# Patient Record
Sex: MALE | Race: WHITE
[De-identification: names, ages, dates, MRNs, and addresses within clinical notes are randomized per-mention and may not be internally consistent; named-entity substitution may affect disease eponyms.]

---

## 2017-01-02 ENCOUNTER — HOSPITAL ENCOUNTER (EMERGENCY)
Dept: HOSPITAL 47 - EC | Age: 67
Discharge: HOME | End: 2017-01-02
Payer: MEDICARE

## 2017-01-02 VITALS — SYSTOLIC BLOOD PRESSURE: 159 MMHG | TEMPERATURE: 98 F | DIASTOLIC BLOOD PRESSURE: 82 MMHG | HEART RATE: 94 BPM

## 2017-01-02 VITALS — RESPIRATION RATE: 18 BRPM

## 2017-01-02 DIAGNOSIS — I10: ICD-10-CM
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
## 2017-01-02 DIAGNOSIS — Z87.891: ICD-10-CM

## 2017-01-02 DIAGNOSIS — F32.9: Primary | ICD-10-CM

## 2017-01-02 DIAGNOSIS — F10.129: ICD-10-CM

## 2017-01-02 DIAGNOSIS — Z79.899: ICD-10-CM

## 2017-01-02 LAB
ALP SERPL-CCNC: 116 U/L (ref 38–126)
ALT SERPL-CCNC: 37 U/L (ref 21–72)
AMYLASE SERPL-CCNC: 89 U/L (ref 30–110)
ANION GAP SERPL CALC-SCNC: 19 MMOL/L
AST SERPL-CCNC: 40 U/L (ref 17–59)
BASOPHILS # BLD AUTO: 0.1 K/UL (ref 0–0.2)
BASOPHILS NFR BLD AUTO: 1 %
BUN SERPL-SCNC: 13 MG/DL (ref 9–20)
CALCIUM SPEC-MCNC: 8.8 MG/DL (ref 8.4–10.2)
CH: 31.3
CHCM: 35.3
CHLORIDE SERPL-SCNC: 101 MMOL/L (ref 98–107)
CO2 SERPL-SCNC: 20 MMOL/L (ref 22–30)
EOSINOPHIL # BLD AUTO: 0.1 K/UL (ref 0–0.7)
EOSINOPHIL NFR BLD AUTO: 1 %
ERYTHROCYTE [DISTWIDTH] IN BLOOD BY AUTOMATED COUNT: 5.91 M/UL (ref 4.3–5.9)
ERYTHROCYTE [DISTWIDTH] IN BLOOD: 14.5 % (ref 11.5–15.5)
GLUCOSE SERPL-MCNC: 97 MG/DL (ref 74–99)
HCT VFR BLD AUTO: 52.6 % (ref 39–53)
HDW: 2.7
HGB BLD-MCNC: 18.1 GM/DL (ref 13–17.5)
LUC NFR BLD AUTO: 2 %
LYMPHOCYTES # SPEC AUTO: 1.2 K/UL (ref 1–4.8)
LYMPHOCYTES NFR SPEC AUTO: 15 %
MAGNESIUM SPEC-SCNC: 2.2 MG/DL (ref 1.6–2.3)
MCH RBC QN AUTO: 30.7 PG (ref 25–35)
MCHC RBC AUTO-ENTMCNC: 34.5 G/DL (ref 31–37)
MCV RBC AUTO: 89 FL (ref 80–100)
MONOCYTES # BLD AUTO: 0.4 K/UL (ref 0–1)
MONOCYTES NFR BLD AUTO: 5 %
NEUTROPHILS # BLD AUTO: 6.5 K/UL (ref 1.3–7.7)
NEUTROPHILS NFR BLD AUTO: 78 %
NON-AFRICAN AMERICAN GFR(MDRD): >60
POTASSIUM SERPL-SCNC: 5 MMOL/L (ref 3.5–5.1)
PROT SERPL-MCNC: 7.3 G/DL (ref 6.3–8.2)
SODIUM SERPL-SCNC: 140 MMOL/L (ref 137–145)
WBC # BLD AUTO: 0.12 10*3/UL
WBC # BLD AUTO: 8.3 K/UL (ref 3.8–10.6)
WBC (PEROX): 8.76

## 2017-01-02 PROCEDURE — 82075 ASSAY OF BREATH ETHANOL: CPT

## 2017-01-02 PROCEDURE — 83690 ASSAY OF LIPASE: CPT

## 2017-01-02 PROCEDURE — 85025 COMPLETE CBC W/AUTO DIFF WBC: CPT

## 2017-01-02 PROCEDURE — 80320 DRUG SCREEN QUANTALCOHOLS: CPT

## 2017-01-02 PROCEDURE — 83735 ASSAY OF MAGNESIUM: CPT

## 2017-01-02 PROCEDURE — 80053 COMPREHEN METABOLIC PANEL: CPT

## 2017-01-02 PROCEDURE — 74000: CPT

## 2017-01-02 PROCEDURE — 99285 EMERGENCY DEPT VISIT HI MDM: CPT

## 2017-01-02 PROCEDURE — 36415 COLL VENOUS BLD VENIPUNCTURE: CPT

## 2017-01-02 PROCEDURE — 80306 DRUG TEST PRSMV INSTRMNT: CPT

## 2017-01-02 PROCEDURE — 82150 ASSAY OF AMYLASE: CPT

## 2017-01-02 NOTE — XR
Abdomen

 

HISTORY: Right-sided abdomen pain, recent fall

 

Frontal view of the abdomen is submitted on 3 images

 

Degenerative disc changes are present in the visualized spine. Surgical clips are present in the righ
t upper quadrant. Atherosclerotic vascular calcifications are noted incidentally. There is no pneumop
eritoneum or bowel obstruction. Lung bases are clear. Mild contour abnormality present at the eighth 
rib laterally on the right.

 

IMPRESSION: Nonobstructive bowel gas pattern. Correlate for tenderness right eighth rib laterally.

## 2017-01-02 NOTE — ED
Psych HPI





- General


Source: patient, RN notes reviewed


Mode of arrival: wheelchair





- History of Present Illness


MD Complaint: suicidal ideation, feels depressed, other





<Zenon Jc - Last Filed: 01/02/17 16:21>





<Franklyn Trinidad - Last Filed: 01/02/17 23:00>





- General


Chief Complaint: Psychiatric Symptoms


Stated Complaint: Sucidial


Time Seen by Provider: 01/02/17 14:20





- History of Present Illness


Initial Comments: 





This is a 58-year-old male with a history of alcohol who is here for evaluation 

for feeling depressed he states he does not was at home drinking alcohol and 

getting drunk he states she's had issues with his significant other.  He has 

have a history of pancreatitis and colitis.  He states he started drinking over 

last several weeks again.  He has no particular plan for hurting himself but is 

very stressed. (Zenon Jc)





- Related Data


 Home Medications











 Medication  Instructions  Recorded  Confirmed


 


Atenolol 25 mg PO BID 01/02/17 01/02/17


 


Lisinopril [Prinivil] 20 mg PO BID 01/02/17 01/02/17


 


Loratadine [Claritin] 10 mg PO DAILY PRN 01/02/17 01/02/17











 Allergies











Allergy/AdvReac Type Severity Reaction Status Date / Time


 


No Known Allergies Allergy   Verified 01/02/17 14:44














Review of Systems


ROS Other: All systems not noted in ROS Statement are negative.





<Zenon Jc - Last Filed: 01/02/17 16:21>


ROS Other: All systems not noted in ROS Statement are negative.





<Franklyn Trinidad - Last Filed: 01/02/17 23:00>


ROS Statement: 


Those systems with pertinent positive or pertinent negative responses have been 

documented in the HPI.


 (Zenon Jc)


 (Franklyn Trinidad)





Past Medical History


Past Medical History: Hypertension


History of Any Multi-Drug Resistant Organisms: C-DIFF


Date of last positivie culture/infection: nov 2016


Past Surgical History: Cholecystectomy


Past Psychological History: Depression


Smoking Status: Former smoker


Past Alcohol Use History: Abuse, Daily, Heavy


Past Drug Use History: None Reported





<Zenon Jc - Last Filed: 01/02/17 16:21>





General Exam


Limitations: no limitations


General appearance: alert, in no apparent distress


Head exam: Present: atraumatic, normocephalic, normal inspection


Eye exam: Present: normal appearance, PERRL, EOMI.  Absent: scleral icterus, 

conjunctival injection, periorbital swelling


ENT exam: Present: normal exam, mucous membranes moist


Neck exam: Present: normal inspection.  Absent: tenderness, meningismus, 

lymphadenopathy


Respiratory exam: Present: normal lung sounds bilaterally.  Absent: respiratory 

distress, wheezes, rales, rhonchi, stridor


Cardiovascular Exam: Present: regular rate, normal rhythm, normal heart sounds.

  Absent: systolic murmur, diastolic murmur, rubs, gallop, clicks


GI/Abdominal exam: Present: soft, tenderness (Mild epigastric tenderness no 

guarding rebound masses or bruits), normal bowel sounds.  Absent: distended, 

guarding, rebound, rigid


Extremities exam: Present: normal inspection, full ROM, normal capillary 

refill.  Absent: tenderness, pedal edema, joint swelling, calf tenderness


Back exam: Present: normal inspection


Neurological exam: Present: alert, oriented X3, CN II-XII intact


Psychiatric exam: Present: normal affect, normal mood


Skin exam: Present: warm, dry, intact, normal color.  Absent: rash





<Zenon Jc - Last Filed: 01/02/17 16:21>





<Franklyn Trinidad - Last Filed: 01/02/17 23:00>





- General Exam Comments


Initial Comments: 





Is a well-developed well-nourished awake alert oriented times female he does 

have the smell of alcohol conjoiners is on his breath (Zenon Jc)





Course





<Zenon Jc - Last Filed: 01/02/17 16:21>





<Franklyn Trinidad - Last Filed: 01/02/17 23:00>





 Vital Signs











  01/02/17 01/02/17





  14:14 19:42


 


Temperature 98.4 F 


 


Pulse Rate 107 H 98


 


Respiratory 16 18





Rate  


 


Blood Pressure 146/95 162/76


 


O2 Sat by Pulse 97 96





Oximetry  








 (Zenon Jc)


 (Franklyn Trinidad)





- Reevaluation(s)


Reevaluation #1: 





01/02/17 16:21


The patient is resting comfortably thus far.  His care will be endorsed to Dr. Trinidad who will make the final disposition. (Zenon Jc)





Medical Decision Making





- Lab Data


Result diagrams: 


 01/02/17 15:13





 01/02/17 15:13





<Zenon Jc - Last Filed: 01/02/17 16:21>





- Lab Data


Result diagrams: 


 01/02/17 15:13





 01/02/17 15:13





<Franklyn Trinidad - Last Filed: 01/02/17 23:00>





- Medical Decision Making





Patient was seen by mental health services recommends discharge.  Patient 

reevaluated by myself, Dr. Trinidad.  Patient resting comfortably in bed.  Patient 

denies suicidal ideation and does contract for safety. (Franklyn Trinidad)





- Lab Data





 Lab Results











  01/02/17 01/02/17 01/02/17 Range/Units





  14:31 15:13 15:13 


 


WBC    8.3  (3.8-10.6)  k/uL


 


RBC    5.91 H  (4.30-5.90)  m/uL


 


Hgb    18.1 H  (13.0-17.5)  gm/dL


 


Hct    52.6  (39.0-53.0)  %


 


MCV    89.0  (80.0-100.0)  fL


 


MCH    30.7  (25.0-35.0)  pg


 


MCHC    34.5  (31.0-37.0)  g/dL


 


RDW    14.5  (11.5-15.5)  %


 


Plt Count    143 L  (150-450)  k/uL


 


Neutrophils %    78  %


 


Lymphocytes %    15  %


 


Monocytes %    5  %


 


Eosinophils %    1  %


 


Basophils %    1  %


 


Neutrophils #    6.5  (1.3-7.7)  k/uL


 


Lymphocytes #    1.2  (1.0-4.8)  k/uL


 


Monocytes #    0.4  (0-1.0)  k/uL


 


Eosinophils #    0.1  (0-0.7)  k/uL


 


Basophils #    0.1  (0-0.2)  k/uL


 


Sodium   140   (137-145)  mmol/L


 


Potassium   5.0   (3.5-5.1)  mmol/L


 


Chloride   101   ()  mmol/L


 


Carbon Dioxide   20 L   (22-30)  mmol/L


 


Anion Gap   19   mmol/L


 


BUN   13   (9-20)  mg/dL


 


Creatinine   0.86   (0.66-1.25)  mg/dL


 


Est GFR (MDRD) Af Amer   >60   (>60 ml/min/1.73 sqM)  


 


Est GFR (MDRD) Non-Af   >60   (>60 ml/min/1.73 sqM)  


 


Glucose   97   (74-99)  mg/dL


 


Calcium   8.8   (8.4-10.2)  mg/dL


 


Magnesium   2.2   (1.6-2.3)  mg/dL


 


Total Bilirubin   0.8   (0.2-1.3)  mg/dL


 


AST   40   (17-59)  U/L


 


ALT   37   (21-72)  U/L


 


Alkaline Phosphatase   116   ()  U/L


 


Total Protein   7.3   (6.3-8.2)  g/dL


 


Albumin   4.5   (3.5-5.0)  g/dL


 


Amylase   89   ()  U/L


 


Lipase   150   ()  U/L


 


Urine Opiates Screen  Not Detected    (NotDetected)  


 


Ur Oxycodone Screen  Not Detected    (NotDetected)  


 


Urine Methadone Screen  Not Detected    (NotDetected)  


 


Ur Propoxyphene Screen  Not Detected    (NotDetected)  


 


Ur Barbiturates Screen  Not Detected    (NotDetected)  


 


U Tricyclic Antidepress  Not Detected    (NotDetected)  


 


Ur Phencyclidine Scrn  Not Detected    (NotDetected)  


 


Ur Amphetamines Screen  Not Detected    (NotDetected)  


 


U Methamphetamines Scrn  Not Detected    (NotDetected)  


 


U Benzodiazepines Scrn  Not Detected    (NotDetected)  


 


Urine Cocaine Screen  Not Detected    (NotDetected)  


 


U Marijuana (THC) Screen  Not Detected    (NotDetected)  


 


Serum Alcohol   238   mg/dL











 (Franklyn Trinidad)





Disposition





<Zenon Jc - Last Filed: 01/02/17 16:21>





<Franklyn Trinidad - Last Filed: 01/02/17 23:00>


Clinical Impression: 


 Depression, Alcohol intoxication





Disposition: HOME SELF-CARE


Condition: Stable


Instructions:  Depression (ED), Abuse of Alcohol (ED)


Additional Instructions: 


Discontinue alcohol use.  Please follow-up with your primary care physician in 

the next day or 2 for recheck.  Please follow-up with mental health services as 

directed.  Return for thoughts of harming yourself, worsening symptoms or other 

concerns.


Referrals: 


Rajinder Foreman MD [Primary Care Provider] - 1-2 days

## 2017-01-10 ENCOUNTER — HOSPITAL ENCOUNTER (EMERGENCY)
Dept: HOSPITAL 47 - EC | Age: 67
LOS: 1 days | Discharge: HOME | End: 2017-01-11
Payer: MEDICARE

## 2017-01-10 DIAGNOSIS — F32.9: ICD-10-CM

## 2017-01-10 DIAGNOSIS — Z79.899: ICD-10-CM

## 2017-01-10 DIAGNOSIS — Z87.891: ICD-10-CM

## 2017-01-10 DIAGNOSIS — F10.129: Primary | ICD-10-CM

## 2017-01-10 DIAGNOSIS — I10: ICD-10-CM

## 2017-01-10 DIAGNOSIS — R41.82: ICD-10-CM
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
## 2017-01-10 PROCEDURE — 96374 THER/PROPH/DIAG INJ IV PUSH: CPT

## 2017-01-10 PROCEDURE — 82075 ASSAY OF BREATH ETHANOL: CPT

## 2017-01-10 PROCEDURE — 99284 EMERGENCY DEPT VISIT MOD MDM: CPT

## 2017-01-10 NOTE — ED
Alcohol HPI





- General


Source: patient, EMS


Mode of arrival: EMS


Limitations: altered mental status





<Zenon Carter - Last Filed: 01/10/17 20:57>





<Freddie Baez - Last Filed: 01/11/17 04:38>





- General


Chief Complaint: Alcohol


Stated Complaint: ETOH


Time Seen by Provider: 01/10/17 18:33





- History of Present Illness


Initial Comments: 





This 66-year-old white male presents with a complaint of alcohol intoxication.  

He apparently was found outside on the ground and was unconscious.  He 

apparently easily woke up and relates that he had been drinking significantly 

throughout the day.  He was here not too long ago with alcohol intoxication as 

well.  He states that he had at least 8 drinks today.  He does drink daily for 

the past several weeks.  He denies any actual injuries.  He is alert and 

oriented upon my evaluation and denies any suicidal or homicidal ideations.  No 

other complaints or modifying factors. (Zenon Carter)





- Related Data


 Home Medications











 Medication  Instructions  Recorded  Confirmed


 


Atenolol 25 mg PO BID 01/02/17 01/02/17


 


Lisinopril [Prinivil] 20 mg PO BID 01/02/17 01/02/17


 


Loratadine [Claritin] 10 mg PO DAILY PRN 01/02/17 01/02/17











 Allergies











Allergy/AdvReac Type Severity Reaction Status Date / Time


 


No Known Allergies Allergy   Verified 01/10/17 18:28














Review of Systems


ROS Other: All systems not noted in ROS Statement are negative.





<Zenon Carter - Last Filed: 01/10/17 20:57>


ROS Other: All systems not noted in ROS Statement are negative.





<Freddie Baez - Last Filed: 01/11/17 04:38>


ROS Statement: 


Those systems with pertinent positive or pertinent negative responses have been 

documented in the HPI.








Past Medical History


Past Medical History: Hypertension


History of Any Multi-Drug Resistant Organisms: C-DIFF


Date of last positivie culture/infection: nov 2016


Past Surgical History: Cholecystectomy


Past Psychological History: Depression


Smoking Status: Former smoker


Past Alcohol Use History: Abuse, Daily, Heavy


Past Drug Use History: None Reported





<Zenon Carter - Last Filed: 01/10/17 20:57>





General Exam


Limitations: altered mental status





<Zenon Carter - Last Filed: 01/10/17 20:57>





<Freddie Baez - Last Filed: 01/11/17 04:38>





- General Exam Comments


Initial Comments: 





GENERAL: The patient is well nourished and well hydrated. 


VITAL SIGNS: Heart rate, blood pressure, respiratory rate reviewed as recorded 

in nurse's notes. 


EYES: Pupils are round and reactive. Extraocular movements are intact. No 

conjunctival / lid redness or swelling. 


ENT: No external evidence of injury, swelling, or ecchymosis. Airway is patent. 

Throat is clear. 


NECK: Nontender. No swelling or evidence of injury. No subcutaneous emphysema. 

Trachea is midline. No thyroid mass. 


HEART: Regular rate and rhythm. Good peripheral pulses. 


LUNGS/CHEST: Breath sounds clear and equal bilaterally. No rales, rhonchi, or 

wheezes. No ecchymosis, subcutaneous emphysema, or tenderness. 


ABDOMEN: Abdomen soft without tenderness. No palpable masses or organomegaly. 

No peritoneal signs. No abdominal wall swelling or ecchymosis. 


EXTREMITIES: No extremity tenderness. Normal muscle tone and function. No 

thoracolumbar tenderness. 


NEUROLOGIC: Sensation is grossly intact. Cranial nerve exam reveals face is 

symmetrical, tongue is midline, speech is clear. 


SKIN: No abrasions or ecchymosis is noted. No induration or masses noted. 


PSYCHIATRIC: Alert and oriented.  Appears moderately intoxicated.


 (Zenon Carter)





Course





<Zenon Carter - Last Filed: 01/10/17 20:57>





<Freddie Baez - Last Filed: 01/11/17 04:38>


 Vital Signs











  01/10/17 01/10/17 01/10/17





  18:22 18:47 19:19


 


Temperature 97.5 F L  


 


Pulse Rate 79  80


 


Respiratory 18  18





Rate   


 


Blood Pressure 215/93 199/98 181/86


 


O2 Sat by Pulse 95  90 L





Oximetry   














  01/10/17 01/10/17 01/10/17





  19:48 20:08 20:28


 


Temperature   


 


Pulse Rate   


 


Respiratory   





Rate   


 


Blood Pressure 181/83 183/84 178/81


 


O2 Sat by Pulse   





Oximetry   














  01/10/17 01/10/17 01/11/17





  20:48 21:08 01:54


 


Temperature   99.4 F


 


Pulse Rate   102 H


 


Respiratory   16





Rate   


 


Blood Pressure 179/79 157/79 171/80


 


O2 Sat by Pulse   96





Oximetry   














- Reevaluation(s)


Reevaluation #1: 





01/11/17 04:37


Is reassessed at 4:30 AM, he is stable on his feet he has been eating food been 

drinking water been able to walk back and forth to the bathroom and numb he 

wants to go home (Freddie Baez)





Medical Decision Making





<Zenon Carter - Last Filed: 01/10/17 20:57>





<Freddie Baez - Last Filed: 01/11/17 04:38>





- Medical Decision Making





The patient was seen and examined.  His alcohol level was elevated at 

approximately 271.  Is felt that he would require prolonged stay in the ER for 

sobering.  His blood pressure also is elevated at at 215/93.  IV is established 

and he received 10 mg of hydralazine with improvement blood pressure control.  

Further care will be passed off to oncoming physician. (Zenon Carter)





Disposition





<Zenon Carter - Last Filed: 01/10/17 20:57>





<Freddie Baez - Last Filed: 01/11/17 04:38>


Clinical Impression: 


 Alcohol intoxication, Hypertensive urgency





Disposition: HOME SELF-CARE


Referrals: 


Rajinder Foreman MD [Primary Care Provider] - 1-2 days

## 2017-01-11 VITALS — DIASTOLIC BLOOD PRESSURE: 72 MMHG | RESPIRATION RATE: 14 BRPM | SYSTOLIC BLOOD PRESSURE: 165 MMHG | HEART RATE: 70 BPM

## 2017-01-11 VITALS — TEMPERATURE: 99.4 F

## 2017-05-16 ENCOUNTER — HOSPITAL ENCOUNTER (EMERGENCY)
Dept: HOSPITAL 47 - EC | Age: 67
LOS: 1 days | Discharge: HOME | End: 2017-05-17
Payer: MEDICARE

## 2017-05-16 DIAGNOSIS — F32.9: ICD-10-CM

## 2017-05-16 DIAGNOSIS — Z87.891: ICD-10-CM

## 2017-05-16 DIAGNOSIS — Z79.899: ICD-10-CM

## 2017-05-16 DIAGNOSIS — F10.120: Primary | ICD-10-CM

## 2017-05-16 DIAGNOSIS — I10: ICD-10-CM

## 2017-05-16 PROCEDURE — 99284 EMERGENCY DEPT VISIT MOD MDM: CPT

## 2017-05-16 NOTE — ED
Alcohol HPI





- General


Source: EMS


Mode of arrival: EMS





<Freddie Baez - Last Filed: 05/17/17 00:58>





<Kris Enrique - Last Filed: 05/17/17 06:26>





- General


Chief Complaint: Alcohol


Stated Complaint: ETOH


Time Seen by Provider: 05/16/17 22:11





- History of Present Illness


Initial Comments: 





Patient has a long-standing history of firm alcohol use, he was brought in by 

police tonight he said he has been drinking, he didn't specify how much he 

drank he said he drinks quite a bit every day and he has been drinking for a 

long time.  He does have a ongoing depression denies any suicidal or homicidal 

ideation.  Denies any headache no chest pain no abdominal pain no frequency 

urgency dysuria no symptoms of TIA or CVA (Freddie Baez)





- Related Data


 Home Medications











 Medication  Instructions  Recorded  Confirmed


 


Atenolol 25 mg PO BID 01/02/17 01/02/17


 


Lisinopril [Prinivil] 20 mg PO BID 01/02/17 01/02/17


 


Loratadine [Claritin] 10 mg PO DAILY PRN 01/02/17 01/02/17











 Allergies











Allergy/AdvReac Type Severity Reaction Status Date / Time


 


No Known Allergies Allergy   Verified 05/16/17 23:00














Review of Systems


ROS Other: All systems not noted in ROS Statement are negative.





<Freddie Baez - Last Filed: 05/17/17 00:58>


ROS Other: All systems not noted in ROS Statement are negative.





<Kris Enrique - Last Filed: 05/17/17 06:26>


ROS Statement: 


Those systems with pertinent positive or pertinent negative responses have been 

documented in the HPI.








Past Medical History


Past Medical History: Hypertension


History of Any Multi-Drug Resistant Organisms: C-DIFF


Date of last positivie culture/infection: nov 2016


Past Surgical History: Cholecystectomy


Past Psychological History: Depression


Smoking Status: Former smoker


Past Alcohol Use History: Abuse, Daily, Heavy


Past Drug Use History: None Reported





<Freddie Baez - Last Filed: 05/17/17 00:58>





General Exam





<Freddie Baez - Last Filed: 05/17/17 00:58>





<Kris Enrique - Last Filed: 05/17/17 06:26>





- General Exam Comments


Initial Comments: 


General:  The patient is awake and alert, intoxicated but pleasant and 

cooperative 


Skin:  Skin is warm and dry and no rashes or lesions are noted. 


Eye:  Pupils are equal, round and reactive to light, extra-ocular movements are 

intact; there is normal conjunctiva bilaterally.  


Ears, nose, mouth and throat:  There are moist mucous membranes and no oral 

lesions. 


Neck:  The neck is supple, there is no tenderness  or JVD.  


Cardiovascular:  There is a regular rate and rhythm. No murmur, rub or gallop 

is appreciated.


Respiratory: To auscultation bilateral, no wheezing no rhonchi no distress  

respiratory wise noticed


Gastrointestinal:  Soft, non-distended, non-tender abdomen without masses or 

organomegaly noted. There is no rebound or guarding present. Bowel sounds are 

unremarkable. 


Back:  There is no tenderness to palpation in the midline. There is no obvious 

deformity.


Musculoskeletal:  Normal ROM, no tenderness, There is no pedal edema. There is 

no calf tenderness or swelling. No cords were appreciated.  


Neurological:  CN II-XII intact, Cranial nerves III through XII are intact. 

There are no obvious motor or sensory deficits. Coordination appears grossly 

intact. Speech is normal.


Psychiatric:  Cooperative, intoxicated stated he does have a history of 

depression 


 (Freddie Baez)





Course





<Freddie Baez - Last Filed: 05/17/17 00:58>





<Kris Enrique - Last Filed: 05/17/17 06:26>





 Vital Signs











  05/16/17





  21:42


 


Temperature 98.7 F


 


Pulse Rate 107 H


 


Respiratory 20





Rate 


 


Blood Pressure 192/104


 


O2 Sat by Pulse 98





Oximetry 








He needs to be seen by psych wants he is not under the influence of alcohol, is 

not currently sober for the psych eval 30 later in the morning endorse that to 

light Dr. Dr. Antonio (Freddie Baez)





Disposition





<Freddie Baez - Last Filed: 05/17/17 00:58>





<Kris Enrique - Last Filed: 05/17/17 06:26>


Clinical Impression: 


 Intoxication, Depression





Disposition: HOME SELF-CARE


Condition: Fair


Instructions:  Alcohol Intoxication (ED)


Referrals: 


Rajinder Foreman MD [Primary Care Provider] - 1-2 days

## 2017-05-17 VITALS — TEMPERATURE: 98 F | SYSTOLIC BLOOD PRESSURE: 150 MMHG | HEART RATE: 106 BPM | DIASTOLIC BLOOD PRESSURE: 90 MMHG

## 2017-05-17 VITALS — RESPIRATION RATE: 20 BRPM

## 2017-07-12 ENCOUNTER — HOSPITAL ENCOUNTER (EMERGENCY)
Dept: HOSPITAL 47 - EC | Age: 67
LOS: 1 days | Discharge: HOME | End: 2017-07-13
Payer: MEDICARE

## 2017-07-12 DIAGNOSIS — F10.129: Primary | ICD-10-CM
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
## 2017-07-12 DIAGNOSIS — Z87.891: ICD-10-CM

## 2017-07-12 DIAGNOSIS — Z79.899: ICD-10-CM

## 2017-07-12 DIAGNOSIS — F43.21: ICD-10-CM

## 2017-07-12 DIAGNOSIS — I10: ICD-10-CM
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
## 2017-07-12 LAB
ALP SERPL-CCNC: 94 U/L (ref 38–126)
ALT SERPL-CCNC: 29 U/L (ref 21–72)
ANION GAP SERPL CALC-SCNC: 16 MMOL/L
AST SERPL-CCNC: 40 U/L (ref 17–59)
BASOPHILS # BLD AUTO: 0.1 K/UL (ref 0–0.2)
BASOPHILS NFR BLD AUTO: 1 %
BUN SERPL-SCNC: 10 MG/DL (ref 9–20)
CALCIUM SPEC-MCNC: 8.8 MG/DL (ref 8.4–10.2)
CH: 31
CHCM: 34.7
CHLORIDE SERPL-SCNC: 105 MMOL/L (ref 98–107)
CO2 SERPL-SCNC: 22 MMOL/L (ref 22–30)
EOSINOPHIL # BLD AUTO: 0.2 K/UL (ref 0–0.7)
EOSINOPHIL NFR BLD AUTO: 3 %
ERYTHROCYTE [DISTWIDTH] IN BLOOD BY AUTOMATED COUNT: 5.5 M/UL (ref 4.3–5.9)
ERYTHROCYTE [DISTWIDTH] IN BLOOD: 13.9 % (ref 11.5–15.5)
GLUCOSE SERPL-MCNC: 89 MG/DL (ref 74–99)
HCT VFR BLD AUTO: 49.4 % (ref 39–53)
HDW: 2.66
HGB BLD-MCNC: 17.4 GM/DL (ref 13–17.5)
LUC NFR BLD AUTO: 2 %
LYMPHOCYTES # SPEC AUTO: 1.9 K/UL (ref 1–4.8)
LYMPHOCYTES NFR SPEC AUTO: 31 %
MCH RBC QN AUTO: 31.6 PG (ref 25–35)
MCHC RBC AUTO-ENTMCNC: 35.2 G/DL (ref 31–37)
MCV RBC AUTO: 89.8 FL (ref 80–100)
MONOCYTES # BLD AUTO: 0.3 K/UL (ref 0–1)
MONOCYTES NFR BLD AUTO: 4 %
NEUTROPHILS # BLD AUTO: 3.6 K/UL (ref 1.3–7.7)
NEUTROPHILS NFR BLD AUTO: 58 %
NON-AFRICAN AMERICAN GFR(MDRD): >60
POTASSIUM SERPL-SCNC: 4.7 MMOL/L (ref 3.5–5.1)
PROT SERPL-MCNC: 7.2 G/DL (ref 6.3–8.2)
SODIUM SERPL-SCNC: 143 MMOL/L (ref 137–145)
WBC # BLD AUTO: 0.12 10*3/UL
WBC # BLD AUTO: 6.1 K/UL (ref 3.8–10.6)
WBC (PEROX): 5.49

## 2017-07-12 PROCEDURE — 82075 ASSAY OF BREATH ETHANOL: CPT

## 2017-07-12 PROCEDURE — 85025 COMPLETE CBC W/AUTO DIFF WBC: CPT

## 2017-07-12 PROCEDURE — 80053 COMPREHEN METABOLIC PANEL: CPT

## 2017-07-12 PROCEDURE — 96366 THER/PROPH/DIAG IV INF ADDON: CPT

## 2017-07-12 PROCEDURE — 99285 EMERGENCY DEPT VISIT HI MDM: CPT

## 2017-07-12 PROCEDURE — 36415 COLL VENOUS BLD VENIPUNCTURE: CPT

## 2017-07-12 PROCEDURE — 80306 DRUG TEST PRSMV INSTRMNT: CPT

## 2017-07-12 PROCEDURE — 96375 TX/PRO/DX INJ NEW DRUG ADDON: CPT

## 2017-07-12 PROCEDURE — 96365 THER/PROPH/DIAG IV INF INIT: CPT
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
## 2017-07-13 VITALS — TEMPERATURE: 97.7 F

## 2017-07-13 VITALS — HEART RATE: 97 BPM | RESPIRATION RATE: 18 BRPM | SYSTOLIC BLOOD PRESSURE: 203 MMHG | DIASTOLIC BLOOD PRESSURE: 115 MMHG

## 2017-07-30 ENCOUNTER — HOSPITAL ENCOUNTER (EMERGENCY)
Dept: HOSPITAL 47 - EC | Age: 67
Discharge: LEFT BEFORE DISCHARGE | End: 2017-07-30
Payer: MEDICARE

## 2017-07-30 ENCOUNTER — HOSPITAL ENCOUNTER (EMERGENCY)
Dept: HOSPITAL 47 - EC | Age: 67
LOS: 1 days | Discharge: OTHER INSTITUTION | End: 2017-07-31
Payer: MEDICARE

## 2017-07-30 VITALS — TEMPERATURE: 99.8 F

## 2017-07-30 VITALS — RESPIRATION RATE: 16 BRPM | SYSTOLIC BLOOD PRESSURE: 200 MMHG | DIASTOLIC BLOOD PRESSURE: 94 MMHG | HEART RATE: 89 BPM

## 2017-07-30 DIAGNOSIS — I62.9: Primary | ICD-10-CM

## 2017-07-30 DIAGNOSIS — I10: ICD-10-CM

## 2017-07-30 DIAGNOSIS — Z79.899: ICD-10-CM

## 2017-07-30 DIAGNOSIS — Z87.891: ICD-10-CM
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
## 2017-07-30 DIAGNOSIS — F10.230: Primary | ICD-10-CM

## 2017-07-30 DIAGNOSIS — R27.0: ICD-10-CM

## 2017-07-30 LAB
ALP SERPL-CCNC: 76 U/L (ref 38–126)
ALT SERPL-CCNC: 44 U/L (ref 21–72)
AMYLASE SERPL-CCNC: 48 U/L (ref 30–110)
ANION GAP SERPL CALC-SCNC: 9 MMOL/L
AST SERPL-CCNC: 62 U/L (ref 17–59)
BASOPHILS # BLD AUTO: 0.1 K/UL (ref 0–0.2)
BASOPHILS NFR BLD AUTO: 1 %
BUN SERPL-SCNC: 15 MG/DL (ref 9–20)
CALCIUM SPEC-MCNC: 9.2 MG/DL (ref 8.4–10.2)
CH: 31.3
CHCM: 33.6
CHLORIDE SERPL-SCNC: 102 MMOL/L (ref 98–107)
CO2 SERPL-SCNC: 28 MMOL/L (ref 22–30)
EOSINOPHIL # BLD AUTO: 0 K/UL (ref 0–0.7)
EOSINOPHIL NFR BLD AUTO: 1 %
ERYTHROCYTE [DISTWIDTH] IN BLOOD BY AUTOMATED COUNT: 5.05 M/UL (ref 4.3–5.9)
ERYTHROCYTE [DISTWIDTH] IN BLOOD: 14.9 % (ref 11.5–15.5)
GLUCOSE SERPL-MCNC: 107 MG/DL (ref 74–99)
HCT VFR BLD AUTO: 47.3 % (ref 39–53)
HDW: 2.39
HGB BLD-MCNC: 15.5 GM/DL (ref 13–17.5)
LUC NFR BLD AUTO: 2 %
LYMPHOCYTES # SPEC AUTO: 0.7 K/UL (ref 1–4.8)
LYMPHOCYTES NFR SPEC AUTO: 15 %
MAGNESIUM SPEC-SCNC: 1.8 MG/DL (ref 1.6–2.3)
MCH RBC QN AUTO: 30.8 PG (ref 25–35)
MCHC RBC AUTO-ENTMCNC: 32.8 G/DL (ref 31–37)
MCV RBC AUTO: 93.7 FL (ref 80–100)
MONOCYTES # BLD AUTO: 0.3 K/UL (ref 0–1)
MONOCYTES NFR BLD AUTO: 7 %
NEUTROPHILS # BLD AUTO: 3.5 K/UL (ref 1.3–7.7)
NEUTROPHILS NFR BLD AUTO: 74 %
NON-AFRICAN AMERICAN GFR(MDRD): >60
PH UR: 7.5 [PH] (ref 5–8)
POTASSIUM SERPL-SCNC: 4.3 MMOL/L (ref 3.5–5.1)
PROT SERPL-MCNC: 7 G/DL (ref 6.3–8.2)
SODIUM SERPL-SCNC: 139 MMOL/L (ref 137–145)
SP GR UR: 1.01 (ref 1–1.03)
UA BILLING (MACRO VS. MICRO): (no result)
UROBILINOGEN UR QL STRIP: 3 MG/DL (ref ?–2)
WBC # BLD AUTO: 0.11 10*3/UL
WBC # BLD AUTO: 4.7 K/UL (ref 3.8–10.6)
WBC (PEROX): 4.68

## 2017-07-30 PROCEDURE — 96361 HYDRATE IV INFUSION ADD-ON: CPT

## 2017-07-30 PROCEDURE — 80053 COMPREHEN METABOLIC PANEL: CPT

## 2017-07-30 PROCEDURE — 99284 EMERGENCY DEPT VISIT MOD MDM: CPT

## 2017-07-30 PROCEDURE — 82150 ASSAY OF AMYLASE: CPT

## 2017-07-30 PROCEDURE — 99285 EMERGENCY DEPT VISIT HI MDM: CPT

## 2017-07-30 PROCEDURE — 80320 DRUG SCREEN QUANTALCOHOLS: CPT

## 2017-07-30 PROCEDURE — 81003 URINALYSIS AUTO W/O SCOPE: CPT

## 2017-07-30 PROCEDURE — 70450 CT HEAD/BRAIN W/O DYE: CPT

## 2017-07-30 PROCEDURE — 96374 THER/PROPH/DIAG INJ IV PUSH: CPT

## 2017-07-30 PROCEDURE — 83690 ASSAY OF LIPASE: CPT

## 2017-07-30 PROCEDURE — 85025 COMPLETE CBC W/AUTO DIFF WBC: CPT

## 2017-07-30 PROCEDURE — 83735 ASSAY OF MAGNESIUM: CPT

## 2017-07-30 PROCEDURE — 36415 COLL VENOUS BLD VENIPUNCTURE: CPT

## 2017-07-30 PROCEDURE — 96372 THER/PROPH/DIAG INJ SC/IM: CPT

## 2017-07-30 PROCEDURE — 80306 DRUG TEST PRSMV INSTRMNT: CPT

## 2017-07-30 NOTE — ED
Fall HPI





- General


Chief Complaint: Fall


Stated Complaint: fall


Time Seen by Provider: 07/30/17 17:23


Source: patient, EMS, RN notes reviewed


Mode of arrival: EMS





- History of Present Illness


Initial Comments: 


patient is a 66-year-old male presents to the emergency room for evaluation.  

Patient is a very poor historian.  According to EMS, patient was found face 

first on the steps.  Patient states that he did not fall on the steps and that 

he was crawling up the steps to get to his house.  Patient states at the time 

he was feeling shaky so that is why he crawled. patient denies any injuries.  

Patient denies head trauma.  Patient denies neck pain. Patient denies headache 

or dizziness.  Patient states he didn't lose consciousness.  Patient states 

that he drinks about 6 beers daily.  Patient states his last beer was Friday at 

1 PM.  Patient states he tried to crawl up the steps when EMS was called. 

Patient states he is still feeling shaky.  Patient denies nausea or vomiting.  

Patient denies headache or dizziness.  Patient denies chest pain or shortness 

of breath.  Patient denies suicidal or homicidal ideations.








- Related Data


 Home Medications











 Medication  Instructions  Recorded  Confirmed


 


Atenolol 25 mg PO BID 01/02/17 07/30/17


 


Lisinopril [Prinivil] 20 mg PO BID 01/02/17 07/30/17


 


LORazepam [Ativan] 0.5 mg PO HS PRN 07/12/17 07/30/17


 


traMADol HCl [Ultram] 50 mg PO DAILY PRN 07/30/17 07/30/17








 Previous Rx's











 Medication  Instructions  Recorded


 


Thiamine [Vitamin B-1] 100 mg PO DAILY #20 tablet 07/31/17











 Allergies











Allergy/AdvReac Type Severity Reaction Status Date / Time


 


No Known Allergies Allergy   Verified 07/30/17 23:04














Review of Systems


ROS Statement: 


Those systems with pertinent positive or pertinent negative responses have been 

documented in the HPI.





ROS Other: All systems not noted in ROS Statement are negative.





Past Medical History


Past Medical History: Hypertension


History of Any Multi-Drug Resistant Organisms: C-DIFF


Date of last positivie culture/infection: nov 2016


Past Surgical History: Cholecystectomy


Past Psychological History: Depression


Smoking Status: Former smoker


Past Alcohol Use History: Abuse, Daily, Heavy


Past Drug Use History: None Reported





General Exam





- General Exam Comments


Initial Comments: 





sitting in exam room, no distress, c-collar on


Limitations: no limitations


General appearance: alert, appears intoxicated


Head exam: Present: atraumatic, normocephalic, normal inspection


Eye exam: Present: normal appearance


ENT exam: Present: normal exam


Neck exam: Present: normal inspection, full ROM.  Absent: tenderness, 

lymphadenopathy


Respiratory exam: Present: normal lung sounds bilaterally.  Absent: respiratory 

distress


Cardiovascular Exam: Present: regular rate, normal rhythm, normal heart sounds


Extremities exam: Present: normal inspection


Back exam: Present: normal inspection


Neurological exam: Present: alert, oriented X3, normal gait


Psychiatric exam: Present: normal affect


Skin exam: Present: warm, dry, intact, normal color.  Absent: rash





Course


 Vital Signs











  07/30/17 07/30/17





  17:27 20:12


 


Temperature 99.8 F H 


 


Pulse Rate 69 89


 


Respiratory 18 16





Rate  


 


Blood Pressure 173/96 200/94


 


O2 Sat by Pulse 95 





Oximetry  














Medical Decision Making





- Medical Decision Making





patient is a 66-year-old male presents to the emergency room for evaluation of 

fall injury.  According to EMS the patient had a fall incident was found face 

first on the steps.  Patient continuously stating that he did not fall.  

Patient states he did not hit his head even though EMS says otherwise.  Brain/C-

spine CT ordered for patient.  Patient is refusing brain/C-spine CT.  Patient 

removed the c-collar.  Patient states he does not want any more of a workup and 

would like to be discharged home.  Patient states he is not feeling shaky 

anymore.  Patient offered Ativan but states it does not work for him. Discussed 

with patient that he would be leaving AGAINST MEDICAL ADVICE. Discussed with 

patient the risk of possible traumatic brain injuries from fall and risk of 

death.  Patient still states that he did not fall and hit his head.  Patient 

states he understands everything that was discussed with him.  Case discussed 

with Dr. Trinidad.





- Lab Data


Result diagrams: 


 07/30/17 17:50





 07/30/17 17:50


 Lab Results











  07/30/17 07/30/17 07/30/17 Range/Units





  17:50 17:50 19:03 


 


WBC   4.7   (3.8-10.6)  k/uL


 


RBC   5.05   (4.30-5.90)  m/uL


 


Hgb   15.5   (13.0-17.5)  gm/dL


 


Hct   47.3   (39.0-53.0)  %


 


MCV   93.7   (80.0-100.0)  fL


 


MCH   30.8   (25.0-35.0)  pg


 


MCHC   32.8   (31.0-37.0)  g/dL


 


RDW   14.9   (11.5-15.5)  %


 


Plt Count   81 L   (150-450)  k/uL


 


Neutrophils %   74   %


 


Lymphocytes %   15   %


 


Monocytes %   7   %


 


Eosinophils %   1   %


 


Basophils %   1   %


 


Neutrophils #   3.5   (1.3-7.7)  k/uL


 


Lymphocytes #   0.7 L   (1.0-4.8)  k/uL


 


Monocytes #   0.3   (0-1.0)  k/uL


 


Eosinophils #   0.0   (0-0.7)  k/uL


 


Basophils #   0.1   (0-0.2)  k/uL


 


Manual Slide Review   Performed   


 


RBC Morphology   Normal   


 


Sodium  139    (137-145)  mmol/L


 


Potassium  4.3    (3.5-5.1)  mmol/L


 


Chloride  102    ()  mmol/L


 


Carbon Dioxide  28    (22-30)  mmol/L


 


Anion Gap  9    mmol/L


 


BUN  15    (9-20)  mg/dL


 


Creatinine  0.90    (0.66-1.25)  mg/dL


 


Est GFR (MDRD) Af Amer  >60    (>60 ml/min/1.73 sqM)  


 


Est GFR (MDRD) Non-Af  >60    (>60 ml/min/1.73 sqM)  


 


Glucose  107 H    (74-99)  mg/dL


 


Calcium  9.2    (8.4-10.2)  mg/dL


 


Magnesium  1.8    (1.6-2.3)  mg/dL


 


Total Bilirubin  1.6 H    (0.2-1.3)  mg/dL


 


AST  62 H    (17-59)  U/L


 


ALT  44    (21-72)  U/L


 


Alkaline Phosphatase  76    ()  U/L


 


Total Protein  7.0    (6.3-8.2)  g/dL


 


Albumin  4.4    (3.5-5.0)  g/dL


 


Amylase  48    ()  U/L


 


Lipase  72    ()  U/L


 


Urine Color    Yellow  


 


Urine Appearance    Clear  (Clear)  


 


Urine pH    7.5  (5.0-8.0)  


 


Ur Specific Gravity    1.014  (1.001-1.035)  


 


Urine Protein    Trace H  (Negative)  


 


Urine Glucose (UA)    Negative  (Negative)  


 


Urine Ketones    Trace H  (Negative)  


 


Urine Blood    Negative  (Negative)  


 


Urine Nitrite    Negative  (Negative)  


 


Urine Bilirubin    Negative  (Negative)  


 


Urine Urobilinogen    3.0  (<2.0)  mg/dL


 


Ur Leukocyte Esterase    Negative  (Negative)  


 


Urine Opiates Screen    Not Detected  (NotDetected)  


 


Ur Oxycodone Screen    Not Detected  (NotDetected)  


 


Urine Methadone Screen    Not Detected  (NotDetected)  


 


Ur Propoxyphene Screen    Not Detected  (NotDetected)  


 


Ur Barbiturates Screen    Not Detected  (NotDetected)  


 


U Tricyclic Antidepress    Not Detected  (NotDetected)  


 


Ur Phencyclidine Scrn    Not Detected  (NotDetected)  


 


Ur Amphetamines Screen    Not Detected  (NotDetected)  


 


U Methamphetamines Scrn    Not Detected  (NotDetected)  


 


U Benzodiazepines Scrn    Detected H  (NotDetected)  


 


Urine Cocaine Screen    Not Detected  (NotDetected)  


 


U Marijuana (THC) Screen    Not Detected  (NotDetected)  


 


Serum Alcohol  <10    mg/dL














Disposition


Clinical Impression: 


 Fall, Alcohol withdrawal





Disposition: Left Against Medical Advice


Referrals: 


Rajinder Foreman MD [Primary Care Provider] - 1-2 days


Time of Disposition: 19:34

## 2017-07-31 VITALS — TEMPERATURE: 97.7 F

## 2017-07-31 VITALS — SYSTOLIC BLOOD PRESSURE: 186 MMHG | HEART RATE: 66 BPM | RESPIRATION RATE: 16 BRPM | DIASTOLIC BLOOD PRESSURE: 111 MMHG

## 2017-07-31 LAB
ALP SERPL-CCNC: 70 U/L (ref 38–126)
ALT SERPL-CCNC: 52 U/L (ref 21–72)
ANION GAP SERPL CALC-SCNC: 12 MMOL/L
AST SERPL-CCNC: 56 U/L (ref 17–59)
BASOPHILS # BLD AUTO: 0.1 K/UL (ref 0–0.2)
BASOPHILS NFR BLD AUTO: 1 %
BUN SERPL-SCNC: 14 MG/DL (ref 9–20)
CALCIUM SPEC-MCNC: 9 MG/DL (ref 8.4–10.2)
CH: 31.4
CHCM: 33.2
CHLORIDE SERPL-SCNC: 101 MMOL/L (ref 98–107)
CO2 SERPL-SCNC: 26 MMOL/L (ref 22–30)
EOSINOPHIL # BLD AUTO: 0.1 K/UL (ref 0–0.7)
EOSINOPHIL NFR BLD AUTO: 1 %
ERYTHROCYTE [DISTWIDTH] IN BLOOD BY AUTOMATED COUNT: 4.81 M/UL (ref 4.3–5.9)
ERYTHROCYTE [DISTWIDTH] IN BLOOD: 14.9 % (ref 11.5–15.5)
GLUCOSE SERPL-MCNC: 83 MG/DL (ref 74–99)
HCT VFR BLD AUTO: 45.7 % (ref 39–53)
HDW: 2.33
HGB BLD-MCNC: 14.8 GM/DL (ref 13–17.5)
LUC NFR BLD AUTO: 2 %
LYMPHOCYTES # SPEC AUTO: 0.9 K/UL (ref 1–4.8)
LYMPHOCYTES NFR SPEC AUTO: 22 %
MAGNESIUM SPEC-SCNC: 1.9 MG/DL (ref 1.6–2.3)
MCH RBC QN AUTO: 30.8 PG (ref 25–35)
MCHC RBC AUTO-ENTMCNC: 32.4 G/DL (ref 31–37)
MCV RBC AUTO: 95 FL (ref 80–100)
MONOCYTES # BLD AUTO: 0.3 K/UL (ref 0–1)
MONOCYTES NFR BLD AUTO: 7 %
NEUTROPHILS # BLD AUTO: 2.5 K/UL (ref 1.3–7.7)
NEUTROPHILS NFR BLD AUTO: 66 %
NON-AFRICAN AMERICAN GFR(MDRD): >60
POTASSIUM SERPL-SCNC: 4.1 MMOL/L (ref 3.5–5.1)
PROT SERPL-MCNC: 6.6 G/DL (ref 6.3–8.2)
SODIUM SERPL-SCNC: 139 MMOL/L (ref 137–145)
WBC # BLD AUTO: 0.09 10*3/UL
WBC # BLD AUTO: 3.9 K/UL (ref 3.8–10.6)
WBC (PEROX): 4.06

## 2017-07-31 NOTE — CT
EXAM:

  CT Head Without Intravenous Contrast

 

CLINICAL HISTORY:

  Ataxia, weakness

 

TECHNIQUE:

  Axial computed tomography images of the head/brain without intravenous 

contrast.  CTDI is 57.40 mGy and DLP is 1064.30 mGy-cm.  This CT exam was 

performed using one or more of the following dose reduction techniques: 

automated exposure control, adjustment of the mA and/or kV according to 

patient size, and/or use of iterative reconstruction technique.  Coronal 

and sagittal reconstructions are performed.

 

COMPARISON:

  None

 

FINDINGS:

  Brain:  Intermediate density extra-axial fluid collection over the 

right frontal parietal convexity with a maximum thickness of 7.6 mm 

measured over the posterior frontal lobe.  There is also a hypodense 

extra-axial collection anteriorly over the frontal lobe with a thickness 

of 5.7 mm.  Old lacunar infarct within the left thalamus.  Bilateral 

periventricular and subcortical white matter low attenuation, compatible 

with chronic small vessel ischemic disease.  No intracranial mass.

  Ventricles:  Unremarkable.  No ventriculomegaly.

  Bones/joints:  Unremarkable.  No acute fracture.

  Soft tissues:  Unremarkable.

  Sinuses:  Small mucous retention cyst or polyp within the left 

maxillary sinus.  Small amount of mucosal thickening in the left 

maxillary sinus.  The rest of the paranasal sinuses are clear.

  Mastoid air cells:  Unremarkable as visualized.  No mastoid effusion.

 

IMPRESSION:     

  1.  Subacute subdural hematoma over the right frontal parietal 

convexity measuring up to 7.6 mm.  Small chronic subdural hematoma 

component over the right frontal lobe with a thickness of 5.7 mm.  No 

midline shift.

  2.  Old left thalamic lacunar infarct.  No evidence of acute 

transcortical infarction.

  3.  Chronic small vessel ischemic disease.

 

 

 

[employer]

 

07/31/17 02:22 Verify Receipt Verified receipt with JACK Peralta, 

report given to  Dr. Enrique on 07/31 02:21 (-04:00)
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
## 2018-05-12 ENCOUNTER — HOSPITAL ENCOUNTER (EMERGENCY)
Dept: HOSPITAL 47 - EC | Age: 68
Discharge: HOME | End: 2018-05-12
Payer: MEDICARE

## 2018-05-12 VITALS — SYSTOLIC BLOOD PRESSURE: 168 MMHG | DIASTOLIC BLOOD PRESSURE: 89 MMHG | HEART RATE: 56 BPM | TEMPERATURE: 98.5 F

## 2018-05-12 VITALS — RESPIRATION RATE: 18 BRPM

## 2018-05-12 DIAGNOSIS — I10: ICD-10-CM

## 2018-05-12 DIAGNOSIS — Z87.891: ICD-10-CM

## 2018-05-12 DIAGNOSIS — Z79.899: ICD-10-CM

## 2018-05-12 DIAGNOSIS — M25.552: Primary | ICD-10-CM

## 2018-05-12 PROCEDURE — 99283 EMERGENCY DEPT VISIT LOW MDM: CPT

## 2018-05-12 PROCEDURE — 73502 X-RAY EXAM HIP UNI 2-3 VIEWS: CPT

## 2018-05-12 NOTE — ED
Extremity Problem HPI





- General


Chief complaint: Extremity Problem,Nontraumatic


Stated complaint: Knee/hip pain


Source: patient, RN notes reviewed


Mode of arrival: ambulatory


Limitations: no limitations





- History of Present Illness


MD Complaint: extremity pain, joint pain


Onset/Timin


-: week(s)


Location: left


History of Same: No


Severity scale (1-10): 5


Quality: aching


Consistency: constant


Improves with: rest


Worsens with: weight bearing


Associated Symptoms: denies other symptoms





- Related Data


 Home Medications











 Medication  Instructions  Recorded  Confirmed


 


Atenolol 25 mg PO BID 17


 


Lisinopril [Prinivil] 20 mg PO BID 17


 


LORazepam [Ativan] 0.5 mg PO HS PRN 17


 


traMADol HCl [Ultram] 50 mg PO DAILY PRN 17








 Previous Rx's











 Medication  Instructions  Recorded


 


Thiamine [Vitamin B-1] 100 mg PO DAILY #20 tablet 17


 


Cyclobenzaprine [Flexeril] 10 mg PO TID #20 tablet 18


 


methylPREDNISolone [Medrol] 4 mg PO AS DIRECTED #1 tab.ds.pk 18











 Allergies











Allergy/AdvReac Type Severity Reaction Status Date / Time


 


No Known Allergies Allergy   Verified 18 11:59














Review of Systems


ROS Statement: 


Those systems with pertinent positive or pertinent negative responses have been 

documented in the HPI.





ROS Other: All systems not noted in ROS Statement are negative.





Past Medical History


Past Medical History: Hypertension


Additional Past Medical History / Comment(s): ataxia


History of Any Multi-Drug Resistant Organisms: C-DIFF


Date of last positivie culture/infection: 2016


Past Surgical History: Cholecystectomy


Past Anesthesia/Blood Transfusion Reactions: No Reported Reaction


Past Psychological History: Depression


Smoking Status: Former smoker


Past Alcohol Use History: Daily


Past Drug Use History: None Reported





General Exam





- General Exam Comments


Initial Comments: 





This is a pleasant 67-year-old male who presents emergency department 

complaining of left hip pain.  Patient states the pain has been present for 

about one week.  Patient describes pain to the lateral aspect of hip which 

radiates into the lateral thigh area.  Patient states that the pain is aching 

in nature but also feels somewhat numb at times.  Patient denies any pain below 

the knee.  Patient denies any known injury patient has had hip problems in the 

past patient denies any foot numbness patient denies any difficulty walking 

patient states the pain is exacerbated by some movements patient denies any 

swelling.  Patient denies any chest pain or shortness of breath patient denies 

fever or chills patient denies rashes or lesions patient denies any headache no 

back pain no abdominal pain


Limitations: no limitations


General appearance: alert, in no apparent distress


Head exam: Present: atraumatic, normocephalic, normal inspection


Eye exam: Present: normal appearance, PERRL, EOMI.  Absent: scleral icterus, 

conjunctival injection, periorbital swelling


ENT exam: Present: normal exam, mucous membranes moist


Neck exam: Present: normal inspection.  Absent: tenderness, meningismus, 

lymphadenopathy


Respiratory exam: Present: normal lung sounds bilaterally.  Absent: respiratory 

distress, wheezes, rales, rhonchi, stridor


Cardiovascular Exam: Present: regular rate, normal rhythm, normal heart sounds.

  Absent: systolic murmur, diastolic murmur, rubs, gallop, clicks


GI/Abdominal exam: Present: soft, normal bowel sounds.  Absent: distended, 

tenderness, guarding, rebound, rigid


Extremities exam: Present: normal inspection, full ROM, normal capillary refill

, other (Patient has mild tenderness to the area of the lateral hip overlying 

the greater trochanter area.  There is no erythema or overlying rash.  No 

evidence of infectious process no cellulitis no edema or deformity patient has 

no edema in the thigh or lower leg Homans sign is negative pedal pulses 2+ out 

of 4 bilaterally.  Refill less than 2 seconds).  Absent: tenderness, pedal edema

, joint swelling, calf tenderness


Back exam: Present: normal inspection


Neurological exam: Present: alert, oriented X3, CN II-XII intact


Psychiatric exam: Present: normal affect, normal mood


Skin exam: Present: warm, dry, intact, normal color.  Absent: rash





Course


 Vital Signs











  18





  11:53


 


Temperature 97 F L


 


Pulse Rate 60


 


Respiratory 18





Rate 


 


Blood Pressure 193/93


 


O2 Sat by Pulse 98





Oximetry 














Medical Decision Making





- Medical Decision Making





Plain film x-rays of the pelvis and left hip read by radiology revealed no 

evidence of acute pathology patient does have degenerative disc disease





Medical decision-making, patient's symptoms are likely related to DJD and 

possible nerve impingement there is no evidence of infectious process we'll 

treat the patient conservatively with a Medrol Dosepak and follow-up with 

orthopedics return and follow-up parameters discussed.





Patient counseled on uncontrolled blood pressure and need for follow-up





Disposition


Clinical Impression: 


 Arthralgia of hip, left





Disposition: HOME SELF-CARE


Condition: Good


Instructions:  Arthritis (ED), Hip Pain (ED), Chronic Hypertension (ED)


Additional Instructions: 


Return to the ER at once if the symptoms worsen or problems or difficulties 

arise.  Follow-up with orthopedics as directed.  Return to the ER immediately 

if any new symptoms arise


Prescriptions: 


Cyclobenzaprine [Flexeril] 10 mg PO TID #20 tablet


methylPREDNISolone [Medrol] 4 mg PO AS DIRECTED #1 tab.ds.pk


Is patient prescribed a controlled substance at d/c from ED?: No


When asked, does pt state using other controlled substances?: Yes


Referrals: 


Werner Martinez MD [Medical Doctor] - 18


Rajinder Foreman MD [Primary Care Provider] - 18


Time of Disposition: 12:41

## 2018-05-12 NOTE — XR
EXAMINATION TYPE: XR Hip LT and AP Pelvis , 3 VIEWS

 

DATE OF EXAM ORDERED: 5/12/2018

 

HISTORY: Pain.

 

COMPARISON: None.

 

FINDINGS: No fracture or dislocation is seen. There are degenerative changes present in the left hip 
and also in the lumbar spine.

 

IMPRESSION: 

1. NO ACUTE OSSEOUS LESION.

2. DEGENERATIVE CHANGE.

## 2018-06-16 ENCOUNTER — HOSPITAL ENCOUNTER (OUTPATIENT)
Dept: HOSPITAL 47 - RADMRIMAIN | Age: 68
Discharge: HOME | End: 2018-06-16
Payer: MEDICARE

## 2018-06-16 DIAGNOSIS — R90.89: ICD-10-CM

## 2018-06-16 DIAGNOSIS — G31.9: Primary | ICD-10-CM

## 2018-06-16 DIAGNOSIS — Z86.73: ICD-10-CM

## 2018-06-16 PROCEDURE — 70551 MRI BRAIN STEM W/O DYE: CPT

## 2018-06-17 NOTE — MR
EXAMINATION TYPE: MR brain wo con

 

DATE OF EXAM: 6/16/2018

 

COMPARISON: NONE

 

HISTORY: Stroke

 

T1-weighted sagittal, T2, FLAIR, and diffusion axial, and T2 coronal coronal views of the brain are s
ubmitted.  

 

There is no evidence of acute ischemia. Craniocervical junction maintained. Sella turcica has a jayesh
l appearance. Changes of chronic sinusitis are noted.

 

Areas of abnormal signal the yuniel are suggestive of remote ischemia.

 

There are diffuse confluent areas of abnormal signal white matter. There are additional multiple area
s of abnormal signal the white matter. Findings are felt to BE most compatible with extensive remote 
white matter ischemia.

No midline shift. Craniocervical junction maintained. Sella turcica has a normal appearance.

 

Abnormal signal involving the basal ganglia most likely the basis of prominent Virchow-William spaces. 
Tiny remote lacunar infarctions not excluded.

 

No cerebellopontine angle mass.

 

 

IMPRESSION:

1. No acute intracranial process.

2. Degenerative and extensive nonspecific white matter changes most typical remote microvascular isch
emia. Correlate clinically.

3. Changes of chronic sinusitis

## 2018-07-13 ENCOUNTER — HOSPITAL ENCOUNTER (EMERGENCY)
Dept: HOSPITAL 47 - EC | Age: 68
Discharge: HOME | End: 2018-07-13
Payer: MEDICARE

## 2018-07-13 VITALS — DIASTOLIC BLOOD PRESSURE: 87 MMHG | SYSTOLIC BLOOD PRESSURE: 168 MMHG | RESPIRATION RATE: 18 BRPM | HEART RATE: 99 BPM

## 2018-07-13 VITALS — TEMPERATURE: 97.6 F

## 2018-07-13 DIAGNOSIS — Z79.899: ICD-10-CM

## 2018-07-13 DIAGNOSIS — F10.129: Primary | ICD-10-CM

## 2018-07-13 DIAGNOSIS — Z87.891: ICD-10-CM

## 2018-07-13 DIAGNOSIS — I10: ICD-10-CM

## 2018-07-13 PROCEDURE — 99284 EMERGENCY DEPT VISIT MOD MDM: CPT

## 2018-07-13 PROCEDURE — 82075 ASSAY OF BREATH ETHANOL: CPT

## 2018-07-13 NOTE — ED
Alcohol HPI





- General


Chief Complaint: Alcohol


Stated Complaint: Etoh


Time Seen by Provider: 07/13/18 13:48


Source: EMS


Mode of arrival: EMS





- History of Present Illness


Initial Comments: 





This 67-year-old white male presents for alcohol intoxication.  His wife 

apparently called the police were called EMS.  They found that he is 

intoxicated so brought him into the ER for further evaluation.  He is denying 

any complaints currently.  He denies any actual injuries.  There is no 

psychiatric complaints.  He states that he does drink daily.  He states that he 

only had one to 2 beers today.  No other complaints or modifying factors.





- Related Data


 Home Medications











 Medication  Instructions  Recorded  Confirmed


 


Atenolol 25 mg PO BID 01/02/17 07/13/18


 


Lisinopril [Prinivil] 20 mg PO BID 01/02/17 07/13/18


 


LORazepam [Ativan] 1 mg PO HS PRN 07/13/18 07/13/18











 Allergies











Allergy/AdvReac Type Severity Reaction Status Date / Time


 


No Known Allergies Allergy   Verified 07/13/18 14:09














Review of Systems


ROS Statement: 


Those systems with pertinent positive or pertinent negative responses have been 

documented in the HPI.





ROS Other: All systems not noted in ROS Statement are negative.





Past Medical History


Past Medical History: Hypertension


Additional Past Medical History / Comment(s): ataxia


History of Any Multi-Drug Resistant Organisms: C-DIFF


Date of last positivie culture/infection: nov 2016


Past Surgical History: Cholecystectomy


Past Anesthesia/Blood Transfusion Reactions: No Reported Reaction


Past Psychological History: Anxiety, Depression


Smoking Status: Former smoker


Past Alcohol Use History: Daily


Past Drug Use History: None Reported





General Exam





- General Exam Comments


Initial Comments: 





GENERAL: The patient is well nourished and well hydrated. 


VITAL SIGNS: Heart rate, blood pressure, respiratory rate reviewed as recorded 

in nurse's notes. 


EYES: Pupils are round and reactive. Extraocular movements are intact. No 

conjunctival / lid redness or swelling. 


ENT: No external evidence of injury, swelling, or ecchymosis. Airway is patent. 

Throat is clear. 


NECK: Nontender. No swelling or evidence of injury. No subcutaneous emphysema. 

Trachea is midline. No thyroid mass. 


HEART: Regular rate and rhythm. Good peripheral pulses. 


LUNGS/CHEST: Breath sounds clear and equal bilaterally. No rales, rhonchi, or 

wheezes. No ecchymosis, subcutaneous emphysema, or tenderness. 


ABDOMEN: Abdomen soft without tenderness. No palpable masses or organomegaly. 

No peritoneal signs. No abdominal wall swelling or ecchymosis. 


EXTREMITIES: No extremity tenderness. Normal muscle tone and function. No 

thoracolumbar tenderness. 


NEUROLOGIC: Sensation is grossly intact. Cranial nerve exam reveals face is 

symmetrical, tongue is midline, speech is clear. 


SKIN: No abrasions or ecchymosis is noted. No induration or masses noted. 


PSYCHIATRIC: Alert and oriented.  Overall fairly pleasant but appears 

moderately intoxicated.








Course





 Vital Signs











  07/13/18 07/13/18





  13:49 16:29


 


Temperature 100.3 F H 98.7 F


 


Pulse Rate 103 H 105 H


 


Respiratory 16 20





Rate  


 


Blood Pressure 137/77 157/78


 


O2 Sat by Pulse 92 L 95





Oximetry  














Medical Decision Making





- Medical Decision Making





The patient was seen and examined.  His initial alcohol level was approximately 

216.  His repeat alcohol level is approximately 80.  He is watched here for 

many hours and he did sober up quite well.  He was given Protonix at one point 

as he states that he takes this medication regularly and is due to take it.  He 

denies any additional complaints on recheck.  Is counseled regarding alcohol 

abuse in detail and leaves in no distress.





Disposition


Clinical Impression: 


 Alcoholic intoxication, Alcohol abuse, Hypertension





Disposition: HOME SELF-CARE


Condition: Good


Instructions:  Alcohol Intoxication (ED), Hypertension (ED)


Is patient prescribed a controlled substance at d/c from ED?: No


Referrals: 


Rajinder Foreman MD [Primary Care Provider] - 1-2 days


Time of Disposition: 19:38

## 2018-09-17 ENCOUNTER — HOSPITAL ENCOUNTER (EMERGENCY)
Dept: HOSPITAL 47 - EC | Age: 68
Discharge: HOME | End: 2018-09-17
Payer: MEDICARE

## 2018-09-17 VITALS — TEMPERATURE: 97.8 F

## 2018-09-17 VITALS — HEART RATE: 64 BPM | DIASTOLIC BLOOD PRESSURE: 94 MMHG | SYSTOLIC BLOOD PRESSURE: 147 MMHG

## 2018-09-17 VITALS — RESPIRATION RATE: 16 BRPM
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
## 2018-09-17 DIAGNOSIS — F10.129: ICD-10-CM
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
## 2018-09-17 DIAGNOSIS — S01.112A: Primary | ICD-10-CM

## 2018-09-17 DIAGNOSIS — S80.212A: ICD-10-CM

## 2018-09-17 DIAGNOSIS — S50.12XA: ICD-10-CM

## 2018-09-17 DIAGNOSIS — R94.31: ICD-10-CM

## 2018-09-17 DIAGNOSIS — S60.511A: ICD-10-CM
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
## 2018-09-17 DIAGNOSIS — S00.83XA: ICD-10-CM

## 2018-09-17 DIAGNOSIS — Z23: ICD-10-CM

## 2018-09-17 LAB
ALBUMIN SERPL-MCNC: 4.4 G/DL (ref 3.5–5)
ALP SERPL-CCNC: 73 U/L (ref 38–126)
ALT SERPL-CCNC: 34 U/L (ref 21–72)
ANION GAP SERPL CALC-SCNC: 15 MMOL/L
APTT BLD: 25.6 SEC (ref 22–30)
AST SERPL-CCNC: 42 U/L (ref 17–59)
BASOPHILS # BLD AUTO: 0.1 K/UL (ref 0–0.2)
BASOPHILS NFR BLD AUTO: 2 %
BUN SERPL-SCNC: 15 MG/DL (ref 9–20)
CALCIUM SPEC-MCNC: 8.6 MG/DL (ref 8.4–10.2)
CHLORIDE SERPL-SCNC: 103 MMOL/L (ref 98–107)
CK SERPL-CCNC: 323 U/L (ref 55–170)
CO2 SERPL-SCNC: 20 MMOL/L (ref 22–30)
EOSINOPHIL # BLD AUTO: 0.1 K/UL (ref 0–0.7)
EOSINOPHIL NFR BLD AUTO: 2 %
ERYTHROCYTE [DISTWIDTH] IN BLOOD BY AUTOMATED COUNT: 5.24 M/UL (ref 4.3–5.9)
ERYTHROCYTE [DISTWIDTH] IN BLOOD: 14.4 % (ref 11.5–15.5)
GLUCOSE SERPL-MCNC: 100 MG/DL (ref 74–99)
HCT VFR BLD AUTO: 50.2 % (ref 39–53)
HGB BLD-MCNC: 16.7 GM/DL (ref 13–17.5)
HYALINE CASTS UR QL AUTO: 3 /LPF (ref 0–2)
INR PPP: 1 (ref ?–1.2)
LYMPHOCYTES # SPEC AUTO: 1.3 K/UL (ref 1–4.8)
LYMPHOCYTES NFR SPEC AUTO: 23 %
MCH RBC QN AUTO: 31.8 PG (ref 25–35)
MCHC RBC AUTO-ENTMCNC: 33.2 G/DL (ref 31–37)
MCV RBC AUTO: 95.9 FL (ref 80–100)
MONOCYTES # BLD AUTO: 0.3 K/UL (ref 0–1)
MONOCYTES NFR BLD AUTO: 6 %
NEUTROPHILS # BLD AUTO: 3.5 K/UL (ref 1.3–7.7)
NEUTROPHILS NFR BLD AUTO: 65 %
PH UR: 5 [PH] (ref 5–8)
PLATELET # BLD AUTO: 157 K/UL (ref 150–450)
POTASSIUM SERPL-SCNC: 4.8 MMOL/L (ref 3.5–5.1)
PROT SERPL-MCNC: 6.9 G/DL (ref 6.3–8.2)
PT BLD: 10.2 SEC (ref 9–12)
RBC UR QL: <1 /HPF (ref 0–5)
SODIUM SERPL-SCNC: 138 MMOL/L (ref 137–145)
SP GR UR: 1.01 (ref 1–1.03)
TROPONIN I SERPL-MCNC: 0.01 NG/ML (ref 0–0.03)
UROBILINOGEN UR QL STRIP: <2 MG/DL (ref ?–2)
WBC # BLD AUTO: 5.4 K/UL (ref 3.8–10.6)

## 2018-09-17 PROCEDURE — 99285 EMERGENCY DEPT VISIT HI MDM: CPT

## 2018-09-17 PROCEDURE — 70450 CT HEAD/BRAIN W/O DYE: CPT

## 2018-09-17 PROCEDURE — 80320 DRUG SCREEN QUANTALCOHOLS: CPT

## 2018-09-17 PROCEDURE — 73090 X-RAY EXAM OF FOREARM: CPT

## 2018-09-17 PROCEDURE — 84484 ASSAY OF TROPONIN QUANT: CPT

## 2018-09-17 PROCEDURE — 85730 THROMBOPLASTIN TIME PARTIAL: CPT

## 2018-09-17 PROCEDURE — 36415 COLL VENOUS BLD VENIPUNCTURE: CPT

## 2018-09-17 PROCEDURE — 90471 IMMUNIZATION ADMIN: CPT

## 2018-09-17 PROCEDURE — 86901 BLOOD TYPING SEROLOGIC RH(D): CPT

## 2018-09-17 PROCEDURE — 86850 RBC ANTIBODY SCREEN: CPT

## 2018-09-17 PROCEDURE — 80053 COMPREHEN METABOLIC PANEL: CPT

## 2018-09-17 PROCEDURE — 81001 URINALYSIS AUTO W/SCOPE: CPT

## 2018-09-17 PROCEDURE — 82550 ASSAY OF CK (CPK): CPT

## 2018-09-17 PROCEDURE — 70486 CT MAXILLOFACIAL W/O DYE: CPT

## 2018-09-17 PROCEDURE — 86900 BLOOD TYPING SEROLOGIC ABO: CPT

## 2018-09-17 PROCEDURE — 90715 TDAP VACCINE 7 YRS/> IM: CPT

## 2018-09-17 PROCEDURE — 85025 COMPLETE CBC W/AUTO DIFF WBC: CPT

## 2018-09-17 PROCEDURE — 82553 CREATINE MB FRACTION: CPT

## 2018-09-17 PROCEDURE — 93005 ELECTROCARDIOGRAM TRACING: CPT

## 2018-09-17 PROCEDURE — 96361 HYDRATE IV INFUSION ADD-ON: CPT

## 2018-09-17 PROCEDURE — 71045 X-RAY EXAM CHEST 1 VIEW: CPT

## 2018-09-17 PROCEDURE — 72170 X-RAY EXAM OF PELVIS: CPT

## 2018-09-17 PROCEDURE — 85610 PROTHROMBIN TIME: CPT

## 2018-09-17 PROCEDURE — 96360 HYDRATION IV INFUSION INIT: CPT

## 2018-09-17 PROCEDURE — 80306 DRUG TEST PRSMV INSTRMNT: CPT

## 2018-09-17 PROCEDURE — 72125 CT NECK SPINE W/O DYE: CPT

## 2018-09-17 NOTE — CT
EXAMINATION TYPE: CT brain oswaldo wo con

 

DATE OF EXAM: 9/17/2018

 

COMPARISON: 9/5/2018

 

HISTORY: Fall neck pain. Headache.

 

CT DLP: 1450.60 mGycm

Automated exposure control for dose reduction was used.

 

TECHNIQUE: CT scan of the head and cervical spine are performed without contrast.

 

FINDINGS:   There is some cerebral cortical atrophy. There is no mass effect nor midline shift. There
 is no sign of intracranial hemorrhage. There is large left periorbital hematoma. The calvarium is in
tact.

 

Cervical vertebra have normal alignment. There is large hypertrophic anterior osteophyte formation fr
om C4 to C7. Facet joints are intact. The skull base is intact. There is no evidence of a cervical sp
ine fracture.

 

IMPRESSION:

Cerebral atrophy. No acute intracranial abnormality. Large left periorbital hematoma. Brain appears u
nchanged compared to old exam. No evidence of orbital blowout fracture.

 

Spondylotic changes in the cervical spine. No fracture. No change.

## 2018-09-17 NOTE — XR
EXAMINATION TYPE: XR pelvis AP view

 

DATE OF EXAM: 9/17/2018

 

COMPARISON: NONE

 

HISTORY: Pain. Fall.

 

TECHNIQUE: Single view

 

FINDINGS: Pelvic ring is intact. Proximal femurs and hip joints appear intact. There is calcification
 at the greater trochanter of the left femur. Sacroiliac joints appear normal.

 

IMPRESSION: No acute abnormality of the pelvis. No fracture.

## 2018-09-17 NOTE — XR
EXAMINATION TYPE: XR chest 1V portable

 

DATE OF EXAM: 9/17/2018

 

COMPARISON: 9/5/2018

 

HISTORY: Fall. Chest pain

 

TECHNIQUE: Single frontal view of the chest is obtained.

 

FINDINGS:  There is some linear density at the right lung base. Heart and mediastinum appear normal. 
Lungs are clear of infiltrate. There are chest leads.

 

IMPRESSION:  Subsegmental atelectasis at the right lung base. No significant change.

## 2018-09-17 NOTE — XR
EXAMINATION TYPE: XR forearm LT

 

DATE OF EXAM: 9/17/2018

 

COMPARISON: NONE

 

HISTORY: Pain

 

TECHNIQUE: 2 views

 

FINDINGS: Radius and ulna appear intact. I see no fracture nor dislocation. Joint spaces appear jayesh
l.

 

IMPRESSION: Negative left forearm exam.
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
## 2018-09-17 NOTE — CT
EXAMINATION TYPE: CT facial bones wo con

 

DATE OF EXAM: 9/17/2018

 

COMPARISON: None

 

HISTORY: Fall

 

CT DLP: 660.90 mGycm

Automated exposure control for dose reduction was used.

 

TECHNIQUE: CT scan of the sinuses is performed without contrast, axial images are obtained, coronal r
eformatted images are also reviewed.

 

FINDINGS: Orbital margins are intact. There is no evidence of a blowout fracture. There is bilateral 
patency of the ostia middle complex. Maxilla is intact. Zygomatic arches appear normal. Nasal bone ap
pears intact. Mandibular ring appears intact. There is large left side lateral periorbital soft tissu
e swelling consistent with hematoma. The globes are symmetric. There is no evidence of retro-orbital 
mass. There is also soft tissue swelling anterior to the left zygoma. There is normal aeration of the
 paranasal sinuses.

 

IMPRESSION: Large left periorbital hematoma. Left side soft tissue swelling anterior to the zygoma. N
o fracture seen.

## 2018-09-18 ENCOUNTER — HOSPITAL ENCOUNTER (EMERGENCY)
Dept: HOSPITAL 47 - EC | Age: 68
Discharge: HOME | End: 2018-09-18
Payer: MEDICARE

## 2018-09-18 VITALS
SYSTOLIC BLOOD PRESSURE: 195 MMHG | HEART RATE: 99 BPM | RESPIRATION RATE: 16 BRPM | DIASTOLIC BLOOD PRESSURE: 87 MMHG | TEMPERATURE: 97.6 F
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
## 2018-09-18 DIAGNOSIS — F10.129: Primary | ICD-10-CM
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
## 2018-09-18 PROCEDURE — 80306 DRUG TEST PRSMV INSTRMNT: CPT

## 2018-09-18 PROCEDURE — 99284 EMERGENCY DEPT VISIT MOD MDM: CPT
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
## 2018-11-20 ENCOUNTER — HOSPITAL ENCOUNTER (OUTPATIENT)
Dept: HOSPITAL 47 - RADUSWWP | Age: 68
Discharge: HOME | End: 2018-11-20
Attending: PSYCHIATRY & NEUROLOGY
Payer: MEDICARE

## 2018-11-20 DIAGNOSIS — I65.23: Primary | ICD-10-CM

## 2018-11-20 PROCEDURE — 93880 EXTRACRANIAL BILAT STUDY: CPT

## 2018-11-20 NOTE — US
EXAMINATION TYPE: US carotid duplex BILAT

 

DATE OF EXAM: 11/20/2018

 

COMPARISON: NONE

 

CLINICAL HISTORY: 67-year-old male with R42 Dizziness.

 

TECHNIQUE: Carotid duplex ultrasound examination. Indirect Doppler criteria was utilized.

 

FINDINGS:

 

EXAM MEASUREMENTS: 

 

RIGHT:  Peak Systolic Velocity (PSV) cm/sec

----- Right CCA:  87.7  

----- Right ICA:  86.5     

----- Right ECA:  164.3   

ICA/CCA ratio:  1.0    

 

RIGHT:  End Diastole cm/sec

----- Right CCA:  13.9   

----- Right ICA:  12.8      

----- Right ECA:  7.4     

 

LEFT:  Peak Systolic Velocity (PSV) cm/sec

----- Left CCA:  94.3  

----- Left ICA:  84.3   

----- Left ECA:  11.9  

ICA/CCA ratio:  0.9  

 

LEFT:  End Diastole cm/sec

----- Left CCA:  15.0  

----- Left ICA:  21.5   

----- Left ECA:  8.4 

 

VERTEBRALS (direction of flow):

Right Vertebral: Antegrade

Left Vertebral: Antegrade

 

Rhythm: Not mentioned.

 

Mild atherosclerotic changes at both bifurcations.

 

 

 

IMPRESSION:

No hemodynamically significant stenosis appreciated in either internal carotid artery.

 

 

 

Criteria for Assigning % of Stenosis / Diameter reduction

(Estimation based on the indirect measurements of the internal carotid artery velocities (ICA PSV).

1.  Normal (no stenosis)=ICA PSV < 125 cm/s: ratio < 2.0: ICA EDV<40 cm/s.

2. Less than 50% stenosis=ICA PSV < 125 cm/s: ratio < 2.0: ICA EDV<40 cm/s.

3.  50 to 69% stenosis=ICA PSV of 125 to 230 cm/s: ration 2.0 ? 4.0: ICA EDV  cm/s.

4.  Greater than 70% stenosis to near occlusion= ICA PSV > 230 cm/s: ratio > 4.0: ICA EDV > 100 cm/s.
 

5.  Near occlusion= ICA PSV velocities may be low or undetectable: variable ratio and ICA EDV.

6.  Total occlusion=unable to detect flow.

## 2020-08-31 ENCOUNTER — HOSPITAL ENCOUNTER (INPATIENT)
Dept: HOSPITAL 47 - EC | Age: 70
LOS: 4 days | Discharge: NURSING, REHAB OR LONG-TERM CARE | DRG: 897 | End: 2020-09-04
Attending: INTERNAL MEDICINE | Admitting: INTERNAL MEDICINE
Payer: MEDICARE

## 2020-08-31 DIAGNOSIS — Z79.899: ICD-10-CM

## 2020-08-31 DIAGNOSIS — K21.9: ICD-10-CM

## 2020-08-31 DIAGNOSIS — F41.9: ICD-10-CM
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
## 2020-08-31 DIAGNOSIS — Z82.5: ICD-10-CM

## 2020-08-31 DIAGNOSIS — F10.229: Primary | ICD-10-CM

## 2020-08-31 DIAGNOSIS — Z86.19: ICD-10-CM

## 2020-08-31 DIAGNOSIS — M19.90: ICD-10-CM

## 2020-08-31 DIAGNOSIS — Z86.73: ICD-10-CM

## 2020-08-31 DIAGNOSIS — G62.9: ICD-10-CM

## 2020-08-31 DIAGNOSIS — F10.239: ICD-10-CM

## 2020-08-31 DIAGNOSIS — Z87.11: ICD-10-CM

## 2020-08-31 DIAGNOSIS — K76.9: ICD-10-CM

## 2020-08-31 DIAGNOSIS — I10: ICD-10-CM

## 2020-08-31 DIAGNOSIS — Z90.49: ICD-10-CM

## 2020-08-31 DIAGNOSIS — M10.9: ICD-10-CM

## 2020-08-31 DIAGNOSIS — Z82.49: ICD-10-CM

## 2020-08-31 DIAGNOSIS — D69.59: ICD-10-CM

## 2020-08-31 DIAGNOSIS — Z98.890: ICD-10-CM

## 2020-08-31 DIAGNOSIS — F32.9: ICD-10-CM

## 2020-08-31 DIAGNOSIS — Z82.61: ICD-10-CM

## 2020-08-31 LAB
ALBUMIN SERPL-MCNC: 4.3 G/DL (ref 3.5–5)
ALP SERPL-CCNC: 103 U/L (ref 38–126)
ALT SERPL-CCNC: 46 U/L (ref 4–49)
AMYLASE SERPL-CCNC: 58 U/L (ref 30–110)
ANION GAP SERPL CALC-SCNC: 11 MMOL/L
AST SERPL-CCNC: 120 U/L (ref 17–59)
BASOPHILS # BLD AUTO: 0.1 K/UL (ref 0–0.2)
BASOPHILS NFR BLD AUTO: 2 %
BUN SERPL-SCNC: 19 MG/DL (ref 9–20)
CALCIUM SPEC-MCNC: 8.5 MG/DL (ref 8.4–10.2)
CHLORIDE SERPL-SCNC: 105 MMOL/L (ref 98–107)
CO2 SERPL-SCNC: 21 MMOL/L (ref 22–30)
EOSINOPHIL # BLD AUTO: 0.1 K/UL (ref 0–0.7)
EOSINOPHIL NFR BLD AUTO: 1 %
ERYTHROCYTE [DISTWIDTH] IN BLOOD BY AUTOMATED COUNT: 6 M/UL (ref 4.3–5.9)
ERYTHROCYTE [DISTWIDTH] IN BLOOD: 13.9 % (ref 11.5–15.5)
GLUCOSE SERPL-MCNC: 93 MG/DL (ref 74–99)
HCT VFR BLD AUTO: 55.5 % (ref 39–53)
HGB BLD-MCNC: 18.2 GM/DL (ref 13–17.5)
LIPASE SERPL-CCNC: 186 U/L (ref 23–300)
LYMPHOCYTES # SPEC AUTO: 1.1 K/UL (ref 1–4.8)
LYMPHOCYTES NFR SPEC AUTO: 19 %
MAGNESIUM SPEC-SCNC: 2.2 MG/DL (ref 1.6–2.3)
MCH RBC QN AUTO: 30.2 PG (ref 25–35)
MCHC RBC AUTO-ENTMCNC: 32.7 G/DL (ref 31–37)
MCV RBC AUTO: 92.4 FL (ref 80–100)
MONOCYTES # BLD AUTO: 0.3 K/UL (ref 0–1)
MONOCYTES NFR BLD AUTO: 6 %
NEUTROPHILS # BLD AUTO: 3.8 K/UL (ref 1.3–7.7)
NEUTROPHILS NFR BLD AUTO: 69 %
PLATELET # BLD AUTO: 107 K/UL (ref 150–450)
POTASSIUM SERPL-SCNC: 4.5 MMOL/L (ref 3.5–5.1)
PROT SERPL-MCNC: 6.8 G/DL (ref 6.3–8.2)
SODIUM SERPL-SCNC: 137 MMOL/L (ref 137–145)
WBC # BLD AUTO: 5.5 K/UL (ref 3.8–10.6)

## 2020-08-31 PROCEDURE — 84550 ASSAY OF BLOOD/URIC ACID: CPT

## 2020-08-31 PROCEDURE — 80320 DRUG SCREEN QUANTALCOHOLS: CPT

## 2020-08-31 PROCEDURE — 82607 VITAMIN B-12: CPT

## 2020-08-31 PROCEDURE — 76700 US EXAM ABDOM COMPLETE: CPT

## 2020-08-31 PROCEDURE — 96365 THER/PROPH/DIAG IV INF INIT: CPT

## 2020-08-31 PROCEDURE — 83883 ASSAY NEPHELOMETRY NOT SPEC: CPT

## 2020-08-31 PROCEDURE — 85730 THROMBOPLASTIN TIME PARTIAL: CPT

## 2020-08-31 PROCEDURE — 99285 EMERGENCY DEPT VISIT HI MDM: CPT

## 2020-08-31 PROCEDURE — 83921 ORGANIC ACID SINGLE QUANT: CPT

## 2020-08-31 PROCEDURE — 83540 ASSAY OF IRON: CPT

## 2020-08-31 PROCEDURE — 84165 PROTEIN E-PHORESIS SERUM: CPT

## 2020-08-31 PROCEDURE — 85610 PROTHROMBIN TIME: CPT

## 2020-08-31 PROCEDURE — 83550 IRON BINDING TEST: CPT

## 2020-08-31 PROCEDURE — 83690 ASSAY OF LIPASE: CPT

## 2020-08-31 PROCEDURE — 96372 THER/PROPH/DIAG INJ SC/IM: CPT

## 2020-08-31 PROCEDURE — 80053 COMPREHEN METABOLIC PANEL: CPT

## 2020-08-31 PROCEDURE — 96361 HYDRATE IV INFUSION ADD-ON: CPT

## 2020-08-31 PROCEDURE — 83735 ASSAY OF MAGNESIUM: CPT

## 2020-08-31 PROCEDURE — 82150 ASSAY OF AMYLASE: CPT

## 2020-08-31 PROCEDURE — 86334 IMMUNOFIX E-PHORESIS SERUM: CPT

## 2020-08-31 PROCEDURE — 96375 TX/PRO/DX INJ NEW DRUG ADDON: CPT

## 2020-08-31 PROCEDURE — 82747 ASSAY OF FOLIC ACID RBC: CPT

## 2020-08-31 PROCEDURE — 36415 COLL VENOUS BLD VENIPUNCTURE: CPT

## 2020-08-31 PROCEDURE — 85025 COMPLETE CBC W/AUTO DIFF WBC: CPT
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
## 2020-09-01 LAB
ALBUMIN SERPL-MCNC: 3.3 G/DL (ref 3.5–5)
ALP SERPL-CCNC: 80 U/L (ref 38–126)
ALT SERPL-CCNC: 37 U/L (ref 4–49)
ANION GAP SERPL CALC-SCNC: 3 MMOL/L
AST SERPL-CCNC: 78 U/L (ref 17–59)
BASOPHILS # BLD AUTO: 0.1 K/UL (ref 0–0.2)
BASOPHILS NFR BLD AUTO: 1 %
BUN SERPL-SCNC: 18 MG/DL (ref 9–20)
CALCIUM SPEC-MCNC: 8.1 MG/DL (ref 8.4–10.2)
CHLORIDE SERPL-SCNC: 109 MMOL/L (ref 98–107)
CO2 SERPL-SCNC: 26 MMOL/L (ref 22–30)
EOSINOPHIL # BLD AUTO: 0.1 K/UL (ref 0–0.7)
EOSINOPHIL NFR BLD AUTO: 2 %
ERYTHROCYTE [DISTWIDTH] IN BLOOD BY AUTOMATED COUNT: 5.18 M/UL (ref 4.3–5.9)
ERYTHROCYTE [DISTWIDTH] IN BLOOD: 14 % (ref 11.5–15.5)
GLUCOSE SERPL-MCNC: 96 MG/DL (ref 74–99)
HCT VFR BLD AUTO: 48.8 % (ref 39–53)
HGB BLD-MCNC: 15.9 GM/DL (ref 13–17.5)
LYMPHOCYTES # SPEC AUTO: 1.1 K/UL (ref 1–4.8)
LYMPHOCYTES NFR SPEC AUTO: 20 %
MCH RBC QN AUTO: 30.7 PG (ref 25–35)
MCHC RBC AUTO-ENTMCNC: 32.6 G/DL (ref 31–37)
MCV RBC AUTO: 94.3 FL (ref 80–100)
MONOCYTES # BLD AUTO: 0.4 K/UL (ref 0–1)
MONOCYTES NFR BLD AUTO: 7 %
NEUTROPHILS # BLD AUTO: 3.8 K/UL (ref 1.3–7.7)
NEUTROPHILS NFR BLD AUTO: 68 %
PLATELET # BLD AUTO: 84 K/UL (ref 150–450)
POTASSIUM SERPL-SCNC: 4.5 MMOL/L (ref 3.5–5.1)
PROT SERPL-MCNC: 5.7 G/DL (ref 6.3–8.2)
SODIUM SERPL-SCNC: 138 MMOL/L (ref 137–145)
WBC # BLD AUTO: 5.7 K/UL (ref 3.8–10.6)

## 2020-09-01 RX ADMIN — NALTREXONE HYDROCHLORIDE SCH MG: 50 TABLET, FILM COATED ORAL at 07:47

## 2020-09-01 RX ADMIN — LORATADINE SCH MG: 10 TABLET ORAL at 12:06

## 2020-09-01 RX ADMIN — THERA TABS SCH EACH: TAB at 07:47

## 2020-09-01 RX ADMIN — Medication SCH MG: at 20:31
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
## 2020-09-02 LAB
ALBUMIN SERPL-MCNC: 3.5 G/DL (ref 3.5–5)
ALP SERPL-CCNC: 83 U/L (ref 38–126)
ALT SERPL-CCNC: 29 U/L (ref 4–49)
ANION GAP SERPL CALC-SCNC: 5 MMOL/L
APTT BLD: 24.2 SEC (ref 22–30)
AST SERPL-CCNC: 52 U/L (ref 17–59)
BASOPHILS # BLD AUTO: 0 K/UL (ref 0–0.2)
BASOPHILS NFR BLD AUTO: 0 %
BUN SERPL-SCNC: 18 MG/DL (ref 9–20)
CALCIUM SPEC-MCNC: 8.9 MG/DL (ref 8.4–10.2)
CHLORIDE SERPL-SCNC: 108 MMOL/L (ref 98–107)
CO2 SERPL-SCNC: 27 MMOL/L (ref 22–30)
EOSINOPHIL # BLD AUTO: 0.2 K/UL (ref 0–0.7)
EOSINOPHIL NFR BLD AUTO: 3 %
ERYTHROCYTE [DISTWIDTH] IN BLOOD BY AUTOMATED COUNT: 4.96 M/UL (ref 4.3–5.9)
ERYTHROCYTE [DISTWIDTH] IN BLOOD: 13.9 % (ref 11.5–15.5)
GLUCOSE SERPL-MCNC: 97 MG/DL (ref 74–99)
HCT VFR BLD AUTO: 47.5 % (ref 39–53)
HGB BLD-MCNC: 15.5 GM/DL (ref 13–17.5)
INR PPP: 0.9 (ref ?–1.2)
LYMPHOCYTES # SPEC AUTO: 0.9 K/UL (ref 1–4.8)
LYMPHOCYTES NFR SPEC AUTO: 16 %
MCH RBC QN AUTO: 31.2 PG (ref 25–35)
MCHC RBC AUTO-ENTMCNC: 32.6 G/DL (ref 31–37)
MCV RBC AUTO: 95.6 FL (ref 80–100)
MONOCYTES # BLD AUTO: 0.3 K/UL (ref 0–1)
MONOCYTES NFR BLD AUTO: 6 %
NEUTROPHILS # BLD AUTO: 4.1 K/UL (ref 1.3–7.7)
NEUTROPHILS NFR BLD AUTO: 74 %
PLATELET # BLD AUTO: 63 K/UL (ref 150–450)
POTASSIUM SERPL-SCNC: 3.6 MMOL/L (ref 3.5–5.1)
PROT SERPL-MCNC: 6 G/DL (ref 6.3–8.2)
PT BLD: 9.5 SEC (ref 9–12)
SODIUM SERPL-SCNC: 140 MMOL/L (ref 137–145)
WBC # BLD AUTO: 5.5 K/UL (ref 3.8–10.6)

## 2020-09-02 RX ADMIN — Medication SCH MG: at 20:26

## 2020-09-02 RX ADMIN — NALTREXONE HYDROCHLORIDE SCH MG: 50 TABLET, FILM COATED ORAL at 07:25

## 2020-09-02 RX ADMIN — THERA TABS SCH EACH: TAB at 07:25

## 2020-09-02 RX ADMIN — Medication SCH MG: at 07:25

## 2020-09-02 RX ADMIN — LORATADINE SCH MG: 10 TABLET ORAL at 07:25

## 2020-09-02 NOTE — P.PN
Subjective


Progress Note Date: 09/02/20








Roshan Mir, is a 69-year-old male who presented to [facility] emergency room, with severe alcohol intoxication, EMS were called by 

neighbors who were concerned about patient, in the emergency room patient stated

that he has been drinking since July, however he denied any complaints there was

no fever or chills no headache or dizziness no chest pain no shortness of breath

no cough no nausea or vomiting no abdominal pain no diarrhea no blood in the 

stools no burning with urination no frequency or urgency and no hematuria.  

Blood alcohol level was 271 patient was confused, and decision was made to admit

patient to 24-hour observation and start CIWA protocol.


Patient has a known history of hypertension, carotid artery stenosis, history of

depression and history of alcohol dependence.








On 09/02/2020 patient was seen and examined on the medical floor he is alert and

oriented 3 in no apparent distress he is still complaining of hand tremors , 

pain and weakness in bilateral lower extremities and gait disturbance there is 

no fever or chills no headache or dizziness, no chest pain or shortness of 

breath no cough no nausea or vomiting no abdominal pain no diarrhea no blood in 

the stools no burning with urination no frequency or urgency and no hematuria.





Objective





- Vital Signs


Vital signs: 


                                   Vital Signs











Temp  98.1 F   09/02/20 07:28


 


Pulse  59 L  09/02/20 07:28


 


Resp  16   09/02/20 07:28


 


BP  155/83   09/02/20 07:28


 


Pulse Ox  96   09/02/20 07:28








                                 Intake & Output











 09/01/20 09/02/20 09/02/20





 18:59 06:59 18:59


 


Output Total  200 300


 


Balance  -200 -300


 


Output:   


 


  Urine  200 300


 


Other:   


 


  Voiding Method Toilet Toilet Toilet





 Urinal Urinal Urinal


 


  # Voids 2  1


 


  # Bowel Movements 2  














- Exam








In general patient is alert and oriented 3 in no apparent distress


HEENT head normocephalic and atraumatic


Neck is supple no JVD no goiter no lymphadenopathy


Chest exam reveals a few scattered rhonchi no wheezing


Cardiac exam reveals regular heart sounds no murmurs


Abdomen is soft nontender no organomegaly with normal bowel sounds


Extremity exam reveals no edema no cyanosis or clubbing


Neurological examination reveals no gross focal deficit, patient has significant

tremor in his hands








- Labs


CBC & Chem 7: 


                                 09/02/20 05:45





                                 09/02/20 05:45


Labs: 


                  Abnormal Lab Results - Last 24 Hours (Table)











  09/02/20 09/02/20 Range/Units





  05:45 05:45 


 


Plt Count  63 L   (150-450)  k/uL


 


Lymphocytes #  0.9 L   (1.0-4.8)  k/uL


 


Chloride   108 H  ()  mmol/L


 


Total Protein   6.0 L  (6.3-8.2)  g/dL














Assessment and Plan


Plan: 





1.  Alcohol intoxication


2.  Early alcohol withdrawal with tremor


3.  Underlying history of hypertension


4.  Underlying history of depression


5.  Thrombocytopenia worsening Will consult hematology


6.  Bilateral feet pain and gait disturbance likely related to peripheral 

neuropathy, physical therapy and occupational therapy consults were initiated 

patient may need rehab before going home.





At this time patient is admitted to observation unit


He was started on CIWA protocol


Home medication will reorder


Will follow closely


He was counseled in regard to alcohol abstinence

## 2020-09-03 LAB
BASOPHILS # BLD AUTO: 0.1 K/UL (ref 0–0.2)
BASOPHILS NFR BLD AUTO: 1 %
EOSINOPHIL # BLD AUTO: 0.1 K/UL (ref 0–0.7)
EOSINOPHIL NFR BLD AUTO: 1 %
ERYTHROCYTE [DISTWIDTH] IN BLOOD BY AUTOMATED COUNT: 5.34 M/UL (ref 4.3–5.9)
ERYTHROCYTE [DISTWIDTH] IN BLOOD: 13.9 % (ref 11.5–15.5)
HCT VFR BLD AUTO: 51.3 % (ref 39–53)
HGB BLD-MCNC: 16.3 GM/DL (ref 13–17.5)
IRON SERPL-MCNC: 15 UG/DL (ref 65–175)
LYMPHOCYTES # SPEC AUTO: 0.8 K/UL (ref 1–4.8)
LYMPHOCYTES NFR SPEC AUTO: 10 %
MCH RBC QN AUTO: 30.5 PG (ref 25–35)
MCHC RBC AUTO-ENTMCNC: 31.7 G/DL (ref 31–37)
MCV RBC AUTO: 96.1 FL (ref 80–100)
MONOCYTES # BLD AUTO: 0.4 K/UL (ref 0–1)
MONOCYTES NFR BLD AUTO: 5 %
NEUTROPHILS # BLD AUTO: 6.5 K/UL (ref 1.3–7.7)
NEUTROPHILS NFR BLD AUTO: 82 %
PLATELET # BLD AUTO: 79 K/UL (ref 150–450)
TIBC SERPL-MCNC: 294 UG/DL (ref 228–460)
URATE SERPL-MCNC: 7.2 MG/DL (ref 3.5–8.5)
VIT B12 SERPL-MCNC: 923 PG/ML (ref 200–944)
WBC # BLD AUTO: 7.9 K/UL (ref 3.8–10.6)

## 2020-09-03 RX ADMIN — Medication SCH MG: at 09:10

## 2020-09-03 RX ADMIN — Medication SCH MG: at 20:34

## 2020-09-03 RX ADMIN — LORATADINE SCH MG: 10 TABLET ORAL at 09:10

## 2020-09-03 RX ADMIN — THERA TABS SCH EACH: TAB at 09:10

## 2020-09-03 RX ADMIN — NALTREXONE HYDROCHLORIDE SCH MG: 50 TABLET, FILM COATED ORAL at 09:39

## 2020-09-03 NOTE — US
EXAMINATION TYPE: US abdomen complete

 

DATE OF EXAM: 9/3/2020

 

COMPARISON: NONE

 

CLINICAL HISTORY: 69-year-old male thrombocytopenia, ETOH.

 

TECHNIQUE: Multiple sonographic images of the abdomen are obtained.

 

FINDINGS:

 

EXAM MEASUREMENTS:

 

Liver Length:  19.5 cm   

Gallbladder:  Surgically absent    

CBD:  0.4 cm

Spleen:  11.8 cm   

Right Kidney:  11.2 x 4.8 x 5.3 cm 

Left Kidney:  11.2 x 5.8 x 5.5 cm   

 

Sonography notes: Technically difficult study. Patient refused to cooperate with examiner in any way,
 extensive overlying bowel gas. 

 

Pancreas:  Obscured by bowel gas

Liver:  enlarged, portions visualized appear attenuating and heterogenous, not visualized in its enti
rety

Gallbladder:  Surgically absent

**Evidence for sonographic Sevilla's sign:  no

CBD:  not definitively identified a structure which may represent a bile duct has a caliber of 3.8 mm
. 

Spleen:  very limited visualization   

Right Kidney: No hydronephrosis

Left Kidney: No hydronephrosis but suboptimal visualization  

Upper IVC:  very limited visualization  

Abd Aorta:  Obscured by overlying bowel gas

 

 

 

 

 

IMPRESSION: 

1. Technically limited exam. The patient was not cooperative. Extensive bowel gas.

2. Hepatomegaly at 19.5 cm with at least moderate hepatic steatosis.

3. Status post cholecystectomy. Bile duct not definitively identified. A structure which may represen
t the bile duct is measured to have a caliber of 4 mm.

## 2020-09-03 NOTE — P.PN
Subjective


Progress Note Date: 09/03/20








Roshan Mir, is a 69-year-old male who presented to [facility] emergency room, with severe alcohol intoxication, EMS were called by 

neighbors who were concerned about patient, in the emergency room patient stated

that he has been drinking since July, however he denied any complaints there was

no fever or chills no headache or dizziness no chest pain no shortness of breath

no cough no nausea or vomiting no abdominal pain no diarrhea no blood in the 

stools no burning with urination no frequency or urgency and no hematuria.  

Blood alcohol level was 271 patient was confused, and decision was made to admit

patient to 24-hour observation and start CIWA protocol.


Patient has a known history of hypertension, carotid artery stenosis, history of

depression and history of alcohol dependence.








On 09/02/2020 patient was seen and examined on the medical floor he is alert and

oriented 3 in no apparent distress he is still complaining of hand tremors , 

pain and weakness in bilateral lower extremities and gait disturbance there is 

no fever or chills no headache or dizziness, no chest pain or shortness of 

breath no cough no nausea or vomiting no abdominal pain no diarrhea no blood in 

the stools no burning with urination no frequency or urgency and no hematuria.





On 09/03/2020 patient was seen and examined on the medical floor he is alert and

oriented in no distress he is complaining of bilateral knee pain right more than

left there is slight effusion in the right knee otherwise he denies any 

complaints there is no fever or chills no headache or dizziness no chest pain no

shortness of breath no cough no nausea or vomiting no abdominal pain no diarrhea

and no urinary symptoms





Objective





- Vital Signs


Vital signs: 


                                   Vital Signs











Temp  99.1 F   09/03/20 15:00


 


Pulse  91   09/03/20 15:00


 


Resp  16   09/03/20 15:00


 


BP  157/85   09/03/20 15:00


 


Pulse Ox  95   09/03/20 15:00








                                 Intake & Output











 09/02/20 09/03/20 09/03/20





 18:59 06:59 18:59


 


Intake Total 100  780


 


Output Total 300 100 400


 


Balance -200 -100 380


 


Intake:   


 


  Oral   780


 


  Other 100  


 


Output:   


 


  Urine 300 100 400


 


Other:   


 


  Voiding Method Toilet Toilet Diaper





 Urinal Urinal Incontinent


 


  # Voids 1 1 1














- Exam








In general patient is alert and oriented 3 in no apparent distress


HEENT head normocephalic and atraumatic


Neck is supple no JVD no goiter no lymphadenopathy


Chest exam reveals a few scattered rhonchi no wheezing


Cardiac exam reveals regular heart sounds no murmurs


Abdomen is soft nontender no organomegaly with normal bowel sounds


Extremity exam reveals no edema no cyanosis or clubbing


Neurological examination reveals no gross focal deficit, patient has significant

tremor in his hands








- Labs


CBC & Chem 7: 


                                 09/03/20 12:48





                                 09/02/20 05:45


Labs: 


                  Abnormal Lab Results - Last 24 Hours (Table)











  09/03/20 Range/Units





  12:48 


 


Plt Count  79 L  (150-450)  k/uL


 


Lymphocytes #  0.8 L  (1.0-4.8)  k/uL














Assessment and Plan


Plan: 





1.  Alcohol intoxication


2.  Early alcohol withdrawal with tremor


3.  Underlying history of hypertension


4.  Underlying history of depression


5.  Thrombocytopenia worsening Will consult hematology


6.  Bilateral feet pain and gait disturbance likely related to peripheral 

neuropathy, physical therapy and occupational therapy consults were initiated 

patient may need rehab before going home.


7.  Right knee pain, possible acute gout patient will be given a dose of IV 

Solu-Medrol with check uric acid and follow in a.m.





At this time patient is admitted to observation unit


He was started on CIWA protocol


Home medication will reorder


Will follow closely


He was counseled in regard to alcohol abstinence

## 2020-09-04 VITALS — RESPIRATION RATE: 20 BRPM

## 2020-09-04 VITALS — TEMPERATURE: 97.9 F | SYSTOLIC BLOOD PRESSURE: 155 MMHG | HEART RATE: 46 BPM | DIASTOLIC BLOOD PRESSURE: 87 MMHG

## 2020-09-04 LAB
ALBUMIN SERPL ELPH-MCNC: 3.36 G/DL (ref 3.8–4.9)
ALBUMIN SERPL-MCNC: 3.6 G/DL (ref 3.5–5)
ALP SERPL-CCNC: 67 U/L (ref 38–126)
ALT SERPL-CCNC: 22 U/L (ref 4–49)
ANION GAP SERPL CALC-SCNC: 7 MMOL/L
AST SERPL-CCNC: 32 U/L (ref 17–59)
BASOPHILS # BLD AUTO: 0 K/UL (ref 0–0.2)
BASOPHILS NFR BLD AUTO: 0 %
BUN SERPL-SCNC: 27 MG/DL (ref 9–20)
CALCIUM SPEC-MCNC: 9.2 MG/DL (ref 8.4–10.2)
CHLORIDE SERPL-SCNC: 108 MMOL/L (ref 98–107)
CO2 SERPL-SCNC: 24 MMOL/L (ref 22–30)
EOSINOPHIL # BLD AUTO: 0 K/UL (ref 0–0.7)
EOSINOPHIL NFR BLD AUTO: 0 %
ERYTHROCYTE [DISTWIDTH] IN BLOOD BY AUTOMATED COUNT: 5.11 M/UL (ref 4.3–5.9)
ERYTHROCYTE [DISTWIDTH] IN BLOOD: 14 % (ref 11.5–15.5)
GAMMA GLOB SERPL ELPH-MCNC: 0.66 G/DL (ref 0.7–1.5)
GLUCOSE SERPL-MCNC: 195 MG/DL (ref 74–99)
HCT VFR BLD AUTO: 49.3 % (ref 39–53)
HGB BLD-MCNC: 15.7 GM/DL (ref 13–17.5)
KAPPA LC FREE SER NEPH-MCNC: 2.04 MG/DL (ref 0.33–1.94)
LYMPHOCYTES # SPEC AUTO: 0.5 K/UL (ref 1–4.8)
LYMPHOCYTES NFR SPEC AUTO: 6 %
MCH RBC QN AUTO: 30.7 PG (ref 25–35)
MCHC RBC AUTO-ENTMCNC: 31.8 G/DL (ref 31–37)
MCV RBC AUTO: 96.5 FL (ref 80–100)
MONOCYTES # BLD AUTO: 0.4 K/UL (ref 0–1)
MONOCYTES NFR BLD AUTO: 5 %
NEUTROPHILS # BLD AUTO: 7.2 K/UL (ref 1.3–7.7)
NEUTROPHILS NFR BLD AUTO: 88 %
PLATELET # BLD AUTO: 97 K/UL (ref 150–450)
POTASSIUM SERPL-SCNC: 4.2 MMOL/L (ref 3.5–5.1)
PROT SERPL-MCNC: 6.4 G/DL (ref 6.3–8.2)
SODIUM SERPL-SCNC: 139 MMOL/L (ref 137–145)
WBC # BLD AUTO: 8.2 K/UL (ref 3.8–10.6)

## 2020-09-04 RX ADMIN — THERA TABS SCH EACH: TAB at 08:30

## 2020-09-04 RX ADMIN — LORATADINE SCH MG: 10 TABLET ORAL at 08:30

## 2020-09-04 RX ADMIN — Medication SCH MG: at 08:30

## 2020-09-04 RX ADMIN — NALTREXONE HYDROCHLORIDE SCH MG: 50 TABLET, FILM COATED ORAL at 08:30

## 2020-09-04 NOTE — P.DS
Providers


Date of admission: 


09/01/20 11:25





Expected date of discharge: 09/04/20


Attending physician: 


Rajinder Foreman





Consults: 





                                        





09/02/20 13:28


Consult Physician Routine 


   Consulting Provider: Chano Granados


   Consult Reason/Comments: thrombocytopenia


   Do you want consulting provider notified?: Yes











Primary care physician: 


Rajinder [facility] Course: 











Diagnoses on discharge:














1.  Alcohol intoxication


2.  Early alcohol withdrawal with tremor


3.  Underlying history of hypertension


4.  Underlying history of depression


5.  Thrombocytopenia worsening Will consult hematology


6.  Bilateral feet pain and gait disturbance likely related to peripheral 

neuropathy, physical therapy and occupational therapy consults were initiated 

patient may need rehab before going home.


7.  Right knee pain, possible acute gout patient will be given a dose of IV 

Solu-Medrol with check uric acid and follow in a.m.























Hospital course:











Roshan Mir, is a 69-year-old male who presented to [facility] emergency room, with severe alcohol intoxication, EMS were called by 

neighbors who were concerned about patient, in the emergency room patient stated

that he has been drinking since July, however he denied any complaints there was

no fever or chills no headache or dizziness no chest pain no shortness of breath

no cough no nausea or vomiting no abdominal pain no diarrhea no blood in the 

stools no burning with urination no frequency or urgency and no hematuria.  

Blood alcohol level was 271 patient was confused, and decision was made to admit

patient to 24-hour observation and start CIWA protocol.


Patient has a known history of hypertension, carotid artery stenosis, history of

depression and history of alcohol dependence.








On 09/02/2020 patient was seen and examined on the medical floor he is alert and

oriented 3 in no apparent distress he is still complaining of hand tremors , 

pain and weakness in bilateral lower extremities and gait disturbance there is 

no fever or chills no headache or dizziness, no chest pain or shortness of 

breath no cough no nausea or vomiting no abdominal pain no diarrhea no blood in 

the stools no burning with urination no frequency or urgency and no hematuria.





On 09/03/2020 patient was seen and examined on the medical floor he is alert and

oriented in no distress he is complaining of bilateral knee pain right more than

left there is slight effusion in the right knee otherwise he denies any 

complaints there is no fever or chills no headache or dizziness no chest pain no

shortness of breath no cough no nausea or vomiting no abdominal pain no diarrhea

and no urinary symptoms








On 09/04/2020 patient was seen and examined on the medical floor he is alert and

oriented 3 in no apparent distress there is no fever or chills no headache or 

dizziness no chest pain no shortness of breath no cough no nausea or vomiting no

abdominal pain no diarrhea no burning with urination no frequency or urgency no 

hematuria.  Knee pain has improved significantly with 1 dose of IV Solu-Medrol 

uric acid level was normal.  Patient' s tremors have improved significantly he 

can be discharged to nursing home for rehab.





Plan - Discharge Summary


New Discharge Prescriptions: 


New


   LORazepam [Ativan] 0.5 mg PO TID PRN 3 Days #9 tab


     PRN Reason: Agitation





Continue


   atenoloL [Atenolol] 25 mg PO BID #60 tablet


   lisinopriL [Prinivil] 20 mg PO BID #60 tablet


   LORazepam [Ativan] 0.5 mg PO HS PRN


     PRN Reason: Insomnia


   Multivitamins, Thera [Multivitamin (formulary)] 1 tab PO DAILY@1200


   Naltrexone HCl [Revia] 50 mg PO DAILY


   Vitamin B Complex 1 cap PO BID


   Ergocalciferol [Vitamin D2 (DRISDOL)] 50,000 unit PO Q7D


   Loratadine 10 mg PO DAILY


   Thiamine [Vitamin B-1] 100 mg PO BID


Discharge Medication List





atenoloL [Atenolol] 25 mg PO BID #60 tablet 09/07/18 [Rx]


lisinopriL [Prinivil] 20 mg PO BID #60 tablet 09/07/18 [Rx]


LORazepam [Ativan] 0.5 mg PO HS PRN 09/17/18 [History]


Multivitamins, Thera [Multivitamin (formulary)] 1 tab PO DAILY@1200 09/17/18 

[History]


Ergocalciferol [Vitamin D2 (DRISDOL)] 50,000 unit PO Q7D 08/31/20 [History]


Naltrexone HCl [Revia] 50 mg PO DAILY 08/31/20 [History]


Vitamin B Complex 1 cap PO BID 08/31/20 [History]


Loratadine 10 mg PO DAILY 09/01/20 [History]


Thiamine [Vitamin B-1] 100 mg PO BID 09/01/20 [History]


LORazepam [Ativan] 0.5 mg PO TID PRN 3 Days #9 tab 09/04/20 [Rx]








Follow up Appointment(s)/Referral(s): 


Rajinder Foreman MD [Primary Care Provider] - 1-2 days
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
## 2021-05-27 ENCOUNTER — HOSPITAL ENCOUNTER (INPATIENT)
Dept: HOSPITAL 47 - EC | Age: 71
LOS: 7 days | Discharge: NURSING, REHAB OR LONG-TERM CARE | DRG: 897 | End: 2021-06-03
Attending: INTERNAL MEDICINE | Admitting: INTERNAL MEDICINE
Payer: MEDICARE

## 2021-05-27 VITALS — BODY MASS INDEX: 30.2 KG/M2

## 2021-05-27 DIAGNOSIS — F41.9: ICD-10-CM

## 2021-05-27 DIAGNOSIS — I47.2: ICD-10-CM

## 2021-05-27 DIAGNOSIS — I10: ICD-10-CM

## 2021-05-27 DIAGNOSIS — F10.229: ICD-10-CM

## 2021-05-27 DIAGNOSIS — M10.9: ICD-10-CM

## 2021-05-27 DIAGNOSIS — Z20.822: ICD-10-CM

## 2021-05-27 DIAGNOSIS — Z79.899: ICD-10-CM

## 2021-05-27 DIAGNOSIS — F32.9: ICD-10-CM

## 2021-05-27 DIAGNOSIS — Z82.5: ICD-10-CM

## 2021-05-27 DIAGNOSIS — I49.3: ICD-10-CM

## 2021-05-27 DIAGNOSIS — Z82.49: ICD-10-CM

## 2021-05-27 DIAGNOSIS — F10.239: Primary | ICD-10-CM

## 2021-05-27 DIAGNOSIS — I16.0: ICD-10-CM

## 2021-05-27 DIAGNOSIS — R60.0: ICD-10-CM

## 2021-05-27 DIAGNOSIS — K21.9: ICD-10-CM

## 2021-05-27 DIAGNOSIS — R18.8: ICD-10-CM

## 2021-05-27 DIAGNOSIS — R45.851: ICD-10-CM

## 2021-05-27 DIAGNOSIS — Z86.73: ICD-10-CM

## 2021-05-27 LAB
ALBUMIN SERPL-MCNC: 4.2 G/DL (ref 3.5–5)
ALP SERPL-CCNC: 93 U/L (ref 38–126)
ALT SERPL-CCNC: 48 U/L (ref 4–49)
AMYLASE SERPL-CCNC: 55 U/L (ref 30–110)
ANION GAP SERPL CALC-SCNC: 13 MMOL/L
AST SERPL-CCNC: 116 U/L (ref 17–59)
BASOPHILS # BLD AUTO: 0.1 K/UL (ref 0–0.2)
BASOPHILS NFR BLD AUTO: 1 %
BUN SERPL-SCNC: 19 MG/DL (ref 9–20)
CALCIUM SPEC-MCNC: 8.5 MG/DL (ref 8.4–10.2)
CHLORIDE SERPL-SCNC: 97 MMOL/L (ref 98–107)
CO2 SERPL-SCNC: 25 MMOL/L (ref 22–30)
EOSINOPHIL # BLD AUTO: 0.1 K/UL (ref 0–0.7)
EOSINOPHIL NFR BLD AUTO: 2 %
ERYTHROCYTE [DISTWIDTH] IN BLOOD BY AUTOMATED COUNT: 5.41 M/UL (ref 4.3–5.9)
ERYTHROCYTE [DISTWIDTH] IN BLOOD: 13.8 % (ref 11.5–15.5)
GLUCOSE SERPL-MCNC: 119 MG/DL (ref 74–99)
HCT VFR BLD AUTO: 49.6 % (ref 39–53)
HGB BLD-MCNC: 17.4 GM/DL (ref 13–17.5)
HYALINE CASTS UR QL AUTO: 1 /LPF (ref 0–2)
LIPASE SERPL-CCNC: 178 U/L (ref 23–300)
LYMPHOCYTES # SPEC AUTO: 1.5 K/UL (ref 1–4.8)
LYMPHOCYTES NFR SPEC AUTO: 20 %
MCH RBC QN AUTO: 32.1 PG (ref 25–35)
MCHC RBC AUTO-ENTMCNC: 35 G/DL (ref 31–37)
MCV RBC AUTO: 91.7 FL (ref 80–100)
MONOCYTES # BLD AUTO: 0.4 K/UL (ref 0–1)
MONOCYTES NFR BLD AUTO: 5 %
NEUTROPHILS # BLD AUTO: 5.6 K/UL (ref 1.3–7.7)
NEUTROPHILS NFR BLD AUTO: 72 %
PH UR: 5 [PH] (ref 5–8)
PLATELET # BLD AUTO: 93 K/UL (ref 150–450)
POTASSIUM SERPL-SCNC: 3.8 MMOL/L (ref 3.5–5.1)
PROT SERPL-MCNC: 6.8 G/DL (ref 6.3–8.2)
RBC UR QL: <1 /HPF (ref 0–5)
SODIUM SERPL-SCNC: 135 MMOL/L (ref 137–145)
SP GR UR: 1.01 (ref 1–1.03)
UROBILINOGEN UR QL STRIP: <2 MG/DL (ref ?–2)
WBC # BLD AUTO: 7.9 K/UL (ref 3.8–10.6)
WBC #/AREA URNS HPF: <1 /HPF (ref 0–5)

## 2021-05-27 PROCEDURE — 36415 COLL VENOUS BLD VENIPUNCTURE: CPT

## 2021-05-27 PROCEDURE — 82550 ASSAY OF CK (CPK): CPT

## 2021-05-27 PROCEDURE — 93970 EXTREMITY STUDY: CPT

## 2021-05-27 PROCEDURE — 93306 TTE W/DOPPLER COMPLETE: CPT

## 2021-05-27 PROCEDURE — 96374 THER/PROPH/DIAG INJ IV PUSH: CPT

## 2021-05-27 PROCEDURE — 99285 EMERGENCY DEPT VISIT HI MDM: CPT

## 2021-05-27 PROCEDURE — 82150 ASSAY OF AMYLASE: CPT

## 2021-05-27 PROCEDURE — 85379 FIBRIN DEGRADATION QUANT: CPT

## 2021-05-27 PROCEDURE — 76700 US EXAM ABDOM COMPLETE: CPT

## 2021-05-27 PROCEDURE — 96361 HYDRATE IV INFUSION ADD-ON: CPT

## 2021-05-27 PROCEDURE — 85025 COMPLETE CBC W/AUTO DIFF WBC: CPT

## 2021-05-27 PROCEDURE — 93880 EXTRACRANIAL BILAT STUDY: CPT

## 2021-05-27 PROCEDURE — 82553 CREATINE MB FRACTION: CPT

## 2021-05-27 PROCEDURE — 84484 ASSAY OF TROPONIN QUANT: CPT

## 2021-05-27 PROCEDURE — 80320 DRUG SCREEN QUANTALCOHOLS: CPT

## 2021-05-27 PROCEDURE — 71275 CT ANGIOGRAPHY CHEST: CPT

## 2021-05-27 PROCEDURE — 87635 SARS-COV-2 COVID-19 AMP PRB: CPT

## 2021-05-27 PROCEDURE — 93005 ELECTROCARDIOGRAM TRACING: CPT

## 2021-05-27 PROCEDURE — 83690 ASSAY OF LIPASE: CPT

## 2021-05-27 PROCEDURE — 80053 COMPREHEN METABOLIC PANEL: CPT

## 2021-05-27 PROCEDURE — 81001 URINALYSIS AUTO W/SCOPE: CPT

## 2021-05-27 RX ADMIN — CEFAZOLIN SCH MLS/HR: 330 INJECTION, POWDER, FOR SOLUTION INTRAMUSCULAR; INTRAVENOUS at 08:22

## 2021-05-27 RX ADMIN — FAMOTIDINE SCH MG: 20 TABLET, FILM COATED ORAL at 09:29

## 2021-05-27 RX ADMIN — Medication SCH: at 20:45

## 2021-05-27 RX ADMIN — FAMOTIDINE SCH MG: 20 TABLET, FILM COATED ORAL at 22:52

## 2021-05-27 RX ADMIN — NALTREXONE HYDROCHLORIDE SCH MG: 50 TABLET, FILM COATED ORAL at 09:29

## 2021-05-27 NOTE — US
EXAMINATION TYPE: US venous doppler duplex LE 

 

DATE OF EXAM: 5/27/2021 10:08 AM

 

COMPARISON: NONE

 

CLINICAL HISTORY: Erythema.

 

SIDE PERFORMED: Bilateral  

 

TECHNIQUE:  The lower extremity deep venous system is examined utilizing real time linear array sonog
blas with graded compression, doppler sonography and color-flow sonography.

 

VESSELS IMAGED:

Common Femoral Vein

Deep Femoral Vein

Greater Saphenous Vein *

Femoral Vein

Popliteal Vein

Small Saphenous Vein *

Proximal Calf Veins

(* superficial vessels)

 

 

 

Right Leg:  Appears negative for DVT

 

Left Leg:  Appears negative for DVT

 

 

 

IMPRESSION:

1. No evidence of deep venous fibrosis in the bilateral lower extremity veins.
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
## 2021-05-27 NOTE — US
EXAMINATION TYPE: US abdomen complete

 

DATE OF EXAM: 5/27/2021

 

COMPARISON: US dated 9/3/2020

 

CLINICAL HISTORY: abdominal pain. Generalized ABD pain

 

EXAM MEASUREMENTS:

 

Liver Length:  20.5 cm   

CBD:  0.5 cm

Spleen:  12.8 cm   

Right Kidney:  10.5 x 5.3 x 5.4 cm 

Left Kidney:  11.9 x 6.1 x 5.2 cm   

 

**Pt very gassy, unable to take a breath in and hold it**

 

Pancreas:  Obscured by bowel gas

Liver:  Coarse echotexture, enlarged, heterogeneous, difficult to penetrate  

Gallbladder:  Surgically absent

**Evidence for sonographic Sevilla's sign:  No

CBD:  wnl 

Spleen:  wnl   

Right Kidney:  No evidence of hydro   

Left Kidney:  No evidence of hydro   

Upper IVC:  wnl  

Abd Aorta:  Obscured by overlying bowel gas

 

 

 

The liver is heterogeneous.  The intrahepatic portion of the IVC and proximal abdominal aorta are wit
hin normal limits. Gallbladder surgically absent. Common bile duct is unremarkable.  The visualized p
ortions of the pancreas are homogenous.  The spleen is upper limit of normal for size.  Kidneys are s
ymmetric and free of hydronephrosis, cortical medullary differentiation is maintained.  No renal lesi
ons are seen.

 

IMPRESSION: There may be underlying hepatic steatosis with hepatomegaly, hepatocellular disease, exam
 is limited
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
## 2021-05-27 NOTE — CT
EXAMINATION TYPE: CT angio chest

 

DATE OF EXAM: 5/27/2021

 

COMPARISON: None

 

HISTORY: Elevated d-dimer.

 

CT DLP: 499.5 mGycm

Automated exposure control for dose reduction was used.

 

CONTRAST: 

Performed with IV Contrast, patient injected with 100ml mL of Isovue 370.

 

There are 3-D post processed images.

 

There is elevated right diaphragm with atelectasis at the right lung base. Heart is slightly enlarged
. I see no filling defects in the pulmonary arteries. There are no hilar masses. There is no mediasti
nal adenopathy. Thoracic aorta appears intact. There is no aneurysm or dissection. There is 3.7 cm as
cending aorta.

 

The thoracic spine is intact. There is no compression fracture.

 

IMPRESSION:

No evidence of pulmonary embolism.

 

Elevated right diaphragm with right basilar atelectasis. There is possible right diaphragm paralysis.
 No suspicious pulmonary mass. There is some fatty infiltration of the liver.
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
## 2021-05-28 LAB
ALBUMIN SERPL-MCNC: 3.7 G/DL (ref 3.8–4.9)
ALBUMIN/GLOB SERPL: 1.76 G/DL (ref 1.6–3.17)
ALP SERPL-CCNC: 77 U/L (ref 41–126)
ALT SERPL-CCNC: 46 U/L (ref 10–49)
ANION GAP SERPL CALC-SCNC: 8.4 MMOL/L (ref 4–12)
AST SERPL-CCNC: 84 U/L (ref 14–35)
BASOPHILS # BLD AUTO: 0.04 X 10*3/UL (ref 0–0.1)
BASOPHILS NFR BLD AUTO: 0.6 %
BUN SERPL-SCNC: 21 MG/DL (ref 9–27)
BUN/CREAT SERPL: 21 RATIO (ref 12–20)
CALCIUM SPEC-MCNC: 8.4 MG/DL (ref 8.7–10.3)
CHLORIDE SERPL-SCNC: 104 MMOL/L (ref 96–109)
CO2 SERPL-SCNC: 27.6 MMOL/L (ref 21.6–31.8)
EOSINOPHIL # BLD AUTO: 0.05 X 10*3/UL (ref 0.04–0.35)
EOSINOPHIL NFR BLD AUTO: 0.7 %
ERYTHROCYTE [DISTWIDTH] IN BLOOD BY AUTOMATED COUNT: 4.68 X 10*6/UL (ref 4.4–5.6)
ERYTHROCYTE [DISTWIDTH] IN BLOOD: 14.1 % (ref 11.5–14.5)
GLOBULIN SER CALC-MCNC: 2.1 G/DL (ref 1.6–3.3)
GLUCOSE SERPL-MCNC: 118 MG/DL (ref 70–110)
HCT VFR BLD AUTO: 44.3 % (ref 39.6–50)
HGB BLD-MCNC: 14.8 G/DL (ref 13–17)
LYMPHOCYTES # SPEC AUTO: 0.84 X 10*3/UL (ref 0.9–5)
LYMPHOCYTES NFR SPEC AUTO: 11.6 %
MCH RBC QN AUTO: 31.6 PG (ref 27–32)
MCHC RBC AUTO-ENTMCNC: 33.4 G/DL (ref 32–37)
MCV RBC AUTO: 94.7 FL (ref 80–97)
MONOCYTES # BLD AUTO: 0.63 X 10*3/UL (ref 0.2–1)
MONOCYTES NFR BLD AUTO: 8.7 %
NEUTROPHILS # BLD AUTO: 5.62 X 10*3/UL (ref 1.8–7.7)
NEUTROPHILS NFR BLD AUTO: 77.6 %
PLATELET # BLD AUTO: 77 X 10*3/UL (ref 140–440)
POTASSIUM SERPL-SCNC: 3.8 MMOL/L (ref 3.5–5.5)
PROT SERPL-MCNC: 5.8 G/DL (ref 6.2–8.2)
SODIUM SERPL-SCNC: 140 MMOL/L (ref 135–145)
STOMATOCYTES BLD QL SMEAR: (no result)
WBC # BLD AUTO: 7.24 X 10*3/UL (ref 4.5–10)

## 2021-05-28 RX ADMIN — PANTOPRAZOLE SODIUM SCH MG: 40 INJECTION, POWDER, FOR SOLUTION INTRAVENOUS at 08:46

## 2021-05-28 RX ADMIN — CEFAZOLIN SCH: 330 INJECTION, POWDER, FOR SOLUTION INTRAMUSCULAR; INTRAVENOUS at 07:01

## 2021-05-28 RX ADMIN — Medication SCH MG: at 17:54

## 2021-05-28 RX ADMIN — FAMOTIDINE SCH MG: 20 TABLET, FILM COATED ORAL at 08:47

## 2021-05-28 RX ADMIN — NALTREXONE HYDROCHLORIDE SCH MG: 50 TABLET, FILM COATED ORAL at 08:47

## 2021-05-28 RX ADMIN — FAMOTIDINE SCH MG: 20 TABLET, FILM COATED ORAL at 20:59

## 2021-05-28 RX ADMIN — Medication SCH MG: at 08:47

## 2021-05-28 RX ADMIN — ENOXAPARIN SODIUM SCH MG: 40 INJECTION SUBCUTANEOUS at 08:45

## 2021-05-29 LAB
ALBUMIN SERPL-MCNC: 3.7 G/DL (ref 3.5–5)
ALBUMIN/GLOB SERPL: 1.5 {RATIO}
ALP SERPL-CCNC: 81 U/L (ref 38–126)
ALT SERPL-CCNC: 41 U/L (ref 4–49)
ANION GAP SERPL CALC-SCNC: 3 MMOL/L
AST SERPL-CCNC: 81 U/L (ref 17–59)
BASOPHILS # BLD AUTO: 0 K/UL (ref 0–0.2)
BASOPHILS NFR BLD AUTO: 1 %
BUN SERPL-SCNC: 20 MG/DL (ref 9–20)
CALCIUM SPEC-MCNC: 8.2 MG/DL (ref 8.4–10.2)
CHLORIDE SERPL-SCNC: 105 MMOL/L (ref 98–107)
CO2 SERPL-SCNC: 33 MMOL/L (ref 22–30)
EOSINOPHIL # BLD AUTO: 0.1 K/UL (ref 0–0.7)
EOSINOPHIL NFR BLD AUTO: 2 %
ERYTHROCYTE [DISTWIDTH] IN BLOOD BY AUTOMATED COUNT: 4.8 M/UL (ref 4.3–5.9)
ERYTHROCYTE [DISTWIDTH] IN BLOOD: 14.4 % (ref 11.5–15.5)
GLOBULIN SER CALC-MCNC: 2.5 G/DL
GLUCOSE SERPL-MCNC: 106 MG/DL (ref 74–99)
HCT VFR BLD AUTO: 45.1 % (ref 39–53)
HGB BLD-MCNC: 14.8 GM/DL (ref 13–17.5)
LYMPHOCYTES # SPEC AUTO: 0.9 K/UL (ref 1–4.8)
LYMPHOCYTES NFR SPEC AUTO: 17 %
MCH RBC QN AUTO: 30.9 PG (ref 25–35)
MCHC RBC AUTO-ENTMCNC: 32.9 G/DL (ref 31–37)
MCV RBC AUTO: 94 FL (ref 80–100)
MONOCYTES # BLD AUTO: 0.3 K/UL (ref 0–1)
MONOCYTES NFR BLD AUTO: 5 %
NEUTROPHILS # BLD AUTO: 3.9 K/UL (ref 1.3–7.7)
NEUTROPHILS NFR BLD AUTO: 74 %
PLATELET # BLD AUTO: 76 K/UL (ref 150–450)
POTASSIUM SERPL-SCNC: 3.5 MMOL/L (ref 3.5–5.1)
PROT SERPL-MCNC: 6.2 G/DL (ref 6.3–8.2)
SODIUM SERPL-SCNC: 141 MMOL/L (ref 137–145)
WBC # BLD AUTO: 5.3 K/UL (ref 3.8–10.6)

## 2021-05-29 RX ADMIN — ENOXAPARIN SODIUM SCH MG: 40 INJECTION SUBCUTANEOUS at 08:02

## 2021-05-29 RX ADMIN — CEFAZOLIN SCH: 330 INJECTION, POWDER, FOR SOLUTION INTRAMUSCULAR; INTRAVENOUS at 08:07

## 2021-05-29 RX ADMIN — Medication SCH MG: at 08:02

## 2021-05-29 RX ADMIN — NALTREXONE HYDROCHLORIDE SCH MG: 50 TABLET, FILM COATED ORAL at 08:02

## 2021-05-29 RX ADMIN — PANTOPRAZOLE SODIUM SCH MG: 40 INJECTION, POWDER, FOR SOLUTION INTRAVENOUS at 08:02

## 2021-05-29 RX ADMIN — FAMOTIDINE SCH MG: 20 TABLET, FILM COATED ORAL at 20:03

## 2021-05-29 RX ADMIN — Medication SCH MG: at 16:59

## 2021-05-29 RX ADMIN — FAMOTIDINE SCH MG: 20 TABLET, FILM COATED ORAL at 08:02

## 2021-05-29 NOTE — P.PN
Subjective


Progress Note Date: 05/28/21








This is a 70-year-old male patient who presented to the ER with concerns of 

alcohol intoxication and dependence and possible suicidal ideation.  Patient 

appears to be a poor historian unable to recall recent events.  According to ER 

report patient has been unable to take care of himself and was found in the 

fetal position living on his porch.   past medical history of CVA, GERD, 

hypertension, osteoarthritis, cholecystectomy and daily alcohol abuse.  At this 

time patient has been started on withdrawal protocol.  Patient currently in 

suicide precautions.  Consider is at bedside.  Psychiatry service is consulted. 

Bilateral lower extremity is warm and erythematous.  Venous Doppler and d-dimer 

ordered.  Blood pressure also elevated home meds resumed.  Patient complaining 

of nausea and vomiting amylase and lipase within normal limits.  Will order 

ultrasound of abdomen.  This time patient denies chest pain or shortness breath.

 Patient denies nausea vomiting or diarrhea.  Patient denies any urinary burning

or frequency.





On 05/28/2021 Patient was seen and examined on the medical floor, he is alert 

and oriented x 3 in no distress, he denies any complaints there is no fever or 

chills no headache or dizziness no chest pain no shortness of breath no 

palpitation no cough no nausea or vomiting no abdominal pain no diarrhea no 

blood in the stools no burning with urination no frequency or urgency and no 

hematuria, there is no weakness or numbness in any of the extremities no change 

in vision speech or gait.,  Patient is still having episodes of anxiety and mild

agitation, will continue with Ativan per protocol.  D-dimer was elevated on 

presentation bilateral lower extremity Doppler was negative for DVT, chest CTA 

was negative for pulmonary embolism.  Currently patient is maintained on Lovenox

for DVT prophylaxis





Objective





- Vital Signs


Vital signs: 


                                   Vital Signs











Temp  98.1 F   05/28/21 07:24


 


Pulse  61   05/28/21 07:24


 


Resp  17   05/28/21 07:24


 


BP  167/84   05/28/21 07:24


 


Pulse Ox  96   05/28/21 07:24








                                 Intake & Output











 05/27/21 05/28/21 05/28/21





 18:59 06:59 18:59


 


Intake Total  600 


 


Balance  600 


 


Weight 90.718 kg 95.5 kg 


 


Intake:   


 


  Oral  600 


 


Other:   


 


  Voiding Method  Urinal 


 


  # Voids  2 














- Exam





In general patient is alert and oriented ?-3 in no distress


HEENT head normocephalic and atraumatic


Neck is supple no JVD no goiter no lymphadenopathy no carotid bruit


Chest examination is clear to auscultation no crackles no wheezing


Cardiac exam reveals regular heart sounds S1 and S2 no gallops no murmurs


Abdomen is soft nontender no organomegaly with normal bowel sounds


Extremity exam reveals no edema no cyanosis or clubbing


Neurological examination reveals no gross focal deficits





- Labs


CBC & Chem 7: 


                                 05/29/21 08:28





                                 05/29/21 08:28


Labs: 


                  Abnormal Lab Results - Last 24 Hours (Table)











  05/27/21 05/27/21 05/27/21 Range/Units





  05:15 11:15 11:15 


 


D-Dimer   2.02 H   (<0.60)  mg/L FEU


 


Creatine Kinase    601 H  ()  U/L


 


CK-MB (CK-2)  14.4 H    (0.0-2.4)  ng/mL














Assessment and Plan


Assessment: 





1.  Alcohol dependence.  Alcohol withdrawal protocol initiated





2.  Suicidal ideation.  Psychiatry service is consulted.  Patient in suicide 

precautions





3.  Lower extremity swelling.  Venous Doppler d-dimer ordered





4.  Mild abdominal ascites and pain will order abdominal ultrasound.  Amylase 

and lipase within normal limits





5.  Hypertension urgency.  Home meds resumed.  Hydralazine when necessary added





6.  History of alcohol dependence





7.  History of CVA





8.  History of essential hypertension





9.  History of GERD





DVT prophylaxis Lovenox.  GI prophylaxis Protonix


Psychiatry service is consulted


Venous Doppler ordered


Abdominal ultrasound ordered


Troponin ordered


CK level ordered

## 2021-05-29 NOTE — P.PN
Subjective


Progress Note Date: 05/29/21








This is a 70-year-old male patient who presented to the ER with concerns of 

alcohol intoxication and dependence and possible suicidal ideation.  Patient 

appears to be a poor historian unable to recall recent events.  According to ER 

report patient has been unable to take care of himself and was found in the 

fetal position living on his porch.   past medical history of CVA, GERD, 

hypertension, osteoarthritis, cholecystectomy and daily alcohol abuse.  At this 

time patient has been started on withdrawal protocol.  Patient currently in 

suicide precautions.  Consider is at bedside.  Psychiatry service is consulted. 

Bilateral lower extremity is warm and erythematous.  Venous Doppler and d-dimer 

ordered.  Blood pressure also elevated home meds resumed.  Patient complaining 

of nausea and vomiting amylase and lipase within normal limits.  Will order 

ultrasound of abdomen.  This time patient denies chest pain or shortness breath.

 Patient denies nausea vomiting or diarrhea.  Patient denies any urinary burning

or frequency.





On 05/28/2021 Patient was seen and examined on the medical floor, he is alert 

and oriented x 3 in no distress, he denies any complaints there is no fever or 

chills no headache or dizziness no chest pain no shortness of breath no 

palpitation no cough no nausea or vomiting no abdominal pain no diarrhea no 

blood in the stools no burning with urination no frequency or urgency and no 

hematuria, there is no weakness or numbness in any of the extremities no change 

in vision speech or gait.,  Patient is still having episodes of anxiety and mild

agitation, will continue with Ativan per protocol.  D-dimer was elevated on 

presentation bilateral lower extremity Doppler was negative for DVT, chest CTA 

was negative for pulmonary embolism.  Currently patient is maintained on Lovenox

for DVT prophylaxis





On 05/29/2021 patient is more somnolent today, he denies any complaints, there 

is no fever or chills no headache or dizziness no chest pain no shortness of 

breath no palpitation no cough no nausea or vomiting no abdominal pain no 

diarrhea no blood in the stools no burning with urination no frequency or 

urgency and no hematuria, at this time will continue with current management 

will recheck labs in a.m.





Objective





- Vital Signs


Vital signs: 


                                   Vital Signs











Temp  98.4 F   05/29/21 14:00


 


Pulse  66   05/29/21 14:00


 


Resp  18   05/29/21 14:00


 


BP  168/91   05/29/21 14:00


 


Pulse Ox  95   05/29/21 14:00








                                 Intake & Output











 05/28/21 05/29/21 05/29/21





 18:59 06:59 18:59


 


Intake Total  1740 300


 


Balance  1740 300


 


Intake:   


 


  Intake, IV Titration  240 





  Amount   


 


    Sodium Chloride 0.9% 1,  240 





    000 ml @ 20 mls/hr IV .   





    Q24H LEATHA Rx#:[id number]   


 


  Oral  1500 300


 


Other:   


 


  Voiding Method Diaper Diaper Diaper


 


  # Voids 2 3 














- Exam





In general patient is alert and oriented ?-3 in no distress


HEENT head normocephalic and atraumatic


Neck is supple no JVD no goiter no lymphadenopathy no carotid bruit


Chest examination is clear to auscultation no crackles no wheezing


Cardiac exam reveals regular heart sounds S1 and S2 no gallops no murmurs


Abdomen is soft nontender no organomegaly with normal bowel sounds


Extremity exam reveals no edema no cyanosis or clubbing


Neurological examination reveals no gross focal deficits





- Labs


CBC & Chem 7: 


                                 05/29/21 08:28





                                 05/29/21 08:28


Labs: 


                  Abnormal Lab Results - Last 24 Hours (Table)











  05/29/21 05/29/21 Range/Units





  08:28 08:28 


 


Plt Count  76 L   (150-450)  k/uL


 


Lymphocytes #  0.9 L   (1.0-4.8)  k/uL


 


Carbon Dioxide   33 H  (22-30)  mmol/L


 


Glucose   106 H  (74-99)  mg/dL


 


Calcium   8.2 L  (8.4-10.2)  mg/dL


 


AST   81 H  (17-59)  U/L


 


Total Protein   6.2 L  (6.3-8.2)  g/dL














Assessment and Plan


Assessment: 





1.  Alcohol dependence.  Alcohol withdrawal protocol initiated





2.  Suicidal ideation.  Psychiatry service is consulted.  Patient in suicide 

precautions





3.  Lower extremity swelling.  Venous Doppler d-dimer ordered





4.  Mild abdominal ascites and pain will order abdominal ultrasound.  Amylase 

and lipase within normal limits





5.  Hypertension urgency.  Home meds resumed.  Hydralazine when necessary added





6.  History of alcohol dependence





7.  History of CVA





8.  History of essential hypertension





9.  History of GERD





DVT prophylaxis Lovenox.  GI prophylaxis Protonix


Psychiatry service is consulted


Venous Doppler ordered


Abdominal ultrasound ordered


Troponin ordered


CK level ordered

## 2021-05-30 RX ADMIN — ENOXAPARIN SODIUM SCH MG: 40 INJECTION SUBCUTANEOUS at 08:10

## 2021-05-30 RX ADMIN — Medication SCH MG: at 16:39

## 2021-05-30 RX ADMIN — NALTREXONE HYDROCHLORIDE SCH MG: 50 TABLET, FILM COATED ORAL at 08:09

## 2021-05-30 RX ADMIN — CEFAZOLIN SCH: 330 INJECTION, POWDER, FOR SOLUTION INTRAMUSCULAR; INTRAVENOUS at 08:10

## 2021-05-30 RX ADMIN — FAMOTIDINE SCH MG: 20 TABLET, FILM COATED ORAL at 08:09

## 2021-05-30 RX ADMIN — FAMOTIDINE SCH MG: 20 TABLET, FILM COATED ORAL at 20:13

## 2021-05-30 RX ADMIN — PANTOPRAZOLE SODIUM SCH MG: 40 INJECTION, POWDER, FOR SOLUTION INTRAVENOUS at 08:09

## 2021-05-30 RX ADMIN — Medication SCH MG: at 08:09

## 2021-05-30 NOTE — P.PN
Discharge instructions and prescription provided to patient.  Verbalized understanding.  PT stable, tolerating po fluids.  No complaints noted.  Adequate for d/c at this time.   Subjective


Progress Note Date: 05/30/21








This is a 70-year-old male patient who presented to the ER with concerns of 

alcohol intoxication and dependence and possible suicidal ideation.  Patient 

appears to be a poor historian unable to recall recent events.  According to ER 

report patient has been unable to take care of himself and was found in the 

fetal position living on his porch.   past medical history of CVA, GERD, 

hypertension, osteoarthritis, cholecystectomy and daily alcohol abuse.  At this 

time patient has been started on withdrawal protocol.  Patient currently in 

suicide precautions.  Consider is at bedside.  Psychiatry service is consulted. 

Bilateral lower extremity is warm and erythematous.  Venous Doppler and d-dimer 

ordered.  Blood pressure also elevated home meds resumed.  Patient complaining 

of nausea and vomiting amylase and lipase within normal limits.  Will order 

ultrasound of abdomen.  This time patient denies chest pain or shortness breath.

 Patient denies nausea vomiting or diarrhea.  Patient denies any urinary burning

or frequency.





On 05/28/2021 Patient was seen and examined on the medical floor, he is alert 

and oriented x 3 in no distress, he denies any complaints there is no fever or 

chills no headache or dizziness no chest pain no shortness of breath no 

palpitation no cough no nausea or vomiting no abdominal pain no diarrhea no 

blood in the stools no burning with urination no frequency or urgency and no 

hematuria, there is no weakness or numbness in any of the extremities no change 

in vision speech or gait.,  Patient is still having episodes of anxiety and mild

agitation, will continue with Ativan per protocol.  D-dimer was elevated on 

presentation bilateral lower extremity Doppler was negative for DVT, chest CTA 

was negative for pulmonary embolism.  Currently patient is maintained on Lovenox

for DVT prophylaxis





On 05/29/2021 patient is more somnolent today, he denies any complaints, there 

is no fever or chills no headache or dizziness no chest pain no shortness of 

breath no palpitation no cough no nausea or vomiting no abdominal pain no 

diarrhea no blood in the stools no burning with urination no frequency or 

urgency and no hematuria, at this time will continue with current management 

will recheck labs in a.m.





On 05/30/2021 Patient was seen and examined on the medical floor, he is alert 

and oriented x 3 in no distress, he is feeling tired and has generalized body 

aches otherwise he denies any complaints there is no fever or chills no headache

or dizziness no chest pain no shortness of breath no palpitation no cough no 

nausea or vomiting no abdominal pain no diarrhea no blood in the stools no 

burning with urination no frequency or urgency and no hematuria, there is no 

weakness or numbness in any of the extremities no change in vision speech or 

gait.





Objective





- Vital Signs


Vital signs: 


                                   Vital Signs











Temp  97.5 F L  05/30/21 02:29


 


Pulse  63   05/30/21 02:29


 


Resp  18   05/30/21 02:29


 


BP  169/87   05/30/21 02:29


 


Pulse Ox  96   05/30/21 02:29








                                 Intake & Output











 05/29/21 05/30/21 05/30/21





 18:59 06:59 18:59


 


Intake Total 300 100 


 


Balance 300 100 


 


Intake:   


 


  Oral 300 100 


 


Other:   


 


  Voiding Method Diaper Urinal 





  Diaper 


 


  # Voids 2 2 














- Exam





In general patient is alert and oriented ?-3 in no distress


HEENT head normocephalic and atraumatic


Neck is supple no JVD no goiter no lymphadenopathy no carotid bruit


Chest examination is clear to auscultation no crackles no wheezing


Cardiac exam reveals regular heart sounds S1 and S2 no gallops no murmurs


Abdomen is soft nontender no organomegaly with normal bowel sounds


Extremity exam reveals no edema no cyanosis or clubbing


Neurological examination reveals no gross focal deficits





- Labs


CBC & Chem 7: 


                                 05/29/21 08:28





                                 05/29/21 08:28


Labs: 


                  Abnormal Lab Results - Last 24 Hours (Table)











  05/29/21 05/29/21 Range/Units





  08:28 08:28 


 


Plt Count  76 L   (150-450)  k/uL


 


Lymphocytes #  0.9 L   (1.0-4.8)  k/uL


 


Carbon Dioxide   33 H  (22-30)  mmol/L


 


Glucose   106 H  (74-99)  mg/dL


 


Calcium   8.2 L  (8.4-10.2)  mg/dL


 


AST   81 H  (17-59)  U/L


 


Total Protein   6.2 L  (6.3-8.2)  g/dL














Assessment and Plan


Assessment: 





1.  Alcohol dependence.  Alcohol withdrawal protocol initiated





2.  Suicidal ideation.  Psychiatry service is consulted.  Patient in suicide 

precautions





3.  Lower extremity swelling.  Venous Doppler d-dimer ordered





4.  Mild abdominal ascites and pain will order abdominal ultrasound.  Amylase 

and lipase within normal limits





5.  Hypertension urgency.  Home meds resumed.  Hydralazine when necessary added





6.  History of alcohol dependence





7.  History of CVA





8.  History of essential hypertension





9.  History of GERD





DVT prophylaxis Lovenox.  GI prophylaxis Protonix


Psychiatry service is consulted


Venous Doppler ordered


Abdominal ultrasound ordered


Troponin ordered


CK level ordered

## 2021-05-31 RX ADMIN — ENOXAPARIN SODIUM SCH MG: 40 INJECTION SUBCUTANEOUS at 07:33

## 2021-05-31 RX ADMIN — CEFAZOLIN SCH: 330 INJECTION, POWDER, FOR SOLUTION INTRAMUSCULAR; INTRAVENOUS at 09:37

## 2021-05-31 RX ADMIN — FAMOTIDINE SCH MG: 20 TABLET, FILM COATED ORAL at 20:52

## 2021-05-31 RX ADMIN — FAMOTIDINE SCH MG: 20 TABLET, FILM COATED ORAL at 07:34

## 2021-05-31 RX ADMIN — Medication SCH MG: at 07:34

## 2021-05-31 RX ADMIN — NALTREXONE HYDROCHLORIDE SCH MG: 50 TABLET, FILM COATED ORAL at 07:34

## 2021-05-31 RX ADMIN — Medication SCH MG: at 17:32

## 2021-05-31 RX ADMIN — PANTOPRAZOLE SODIUM SCH MG: 40 TABLET, DELAYED RELEASE ORAL at 07:33

## 2021-05-31 NOTE — P.PN
Subjective


Progress Note Date: 05/31/21








This is a 70-year-old male patient who presented to the ER with concerns of 

alcohol intoxication and dependence and possible suicidal ideation.  Patient 

appears to be a poor historian unable to recall recent events.  According to ER 

report patient has been unable to take care of himself and was found in the 

fetal position living on his porch.   past medical history of CVA, GERD, 

hypertension, osteoarthritis, cholecystectomy and daily alcohol abuse.  At this 

time patient has been started on withdrawal protocol.  Patient currently in 

suicide precautions.  Consider is at bedside.  Psychiatry service is consulted. 

Bilateral lower extremity is warm and erythematous.  Venous Doppler and d-dimer 

ordered.  Blood pressure also elevated home meds resumed.  Patient complaining 

of nausea and vomiting amylase and lipase within normal limits.  Will order 

ultrasound of abdomen.  This time patient denies chest pain or shortness breath.

 Patient denies nausea vomiting or diarrhea.  Patient denies any urinary burning

or frequency.





On 05/28/2021 Patient was seen and examined on the medical floor, he is alert 

and oriented x 3 in no distress, he denies any complaints there is no fever or 

chills no headache or dizziness no chest pain no shortness of breath no 

palpitation no cough no nausea or vomiting no abdominal pain no diarrhea no 

blood in the stools no burning with urination no frequency or urgency and no 

hematuria, there is no weakness or numbness in any of the extremities no change 

in vision speech or gait.,  Patient is still having episodes of anxiety and mild

agitation, will continue with Ativan per protocol.  D-dimer was elevated on 

presentation bilateral lower extremity Doppler was negative for DVT, chest CTA 

was negative for pulmonary embolism.  Currently patient is maintained on Lovenox

for DVT prophylaxis





On 05/29/2021 patient is more somnolent today, he denies any complaints, there 

is no fever or chills no headache or dizziness no chest pain no shortness of 

breath no palpitation no cough no nausea or vomiting no abdominal pain no 

diarrhea no blood in the stools no burning with urination no frequency or 

urgency and no hematuria, at this time will continue with current management 

will recheck labs in a.m.





On 05/30/2021 Patient was seen and examined on the medical floor, he is alert 

and oriented x 3 in no distress, he is feeling tired and has generalized body 

aches otherwise he denies any complaints there is no fever or chills no headache

or dizziness no chest pain no shortness of breath no palpitation no cough no 

nausea or vomiting no abdominal pain no diarrhea no blood in the stools no 

burning with urination no frequency or urgency and no hematuria, there is no 

weakness or numbness in any of the extremities no change in vision speech or 

gait.





On 05/31/2021 Patient was seen and examined on the medical floor, he is alert 

and oriented x 3 in no distress, he denies any complaints there is no fever or 

chills no headache or dizziness no chest pain no shortness of breath no 

palpitation no cough no nausea or vomiting no abdominal pain no diarrhea no 

blood in the stools no burning with urination no frequency or urgency and no 

hematuria, at this time will increase mobility with physical therapy and 

occupational therapy, possible discharge to home in the next 1-2 days





Objective





- Vital Signs


Vital signs: 


                                   Vital Signs











Temp  97.7 F   05/31/21 07:28


 


Pulse  59 L  05/31/21 07:28


 


Resp  16   05/31/21 07:28


 


BP  165/88   05/31/21 07:28


 


Pulse Ox  97   05/31/21 07:28








                                 Intake & Output











 05/30/21 05/31/21 05/31/21





 18:59 06:59 18:59


 


Intake Total 1060  


 


Balance 1060  


 


Intake:   


 


  Oral 1060  


 


Other:   


 


  Voiding Method Urinal  Urinal





 Diaper  Diaper


 


  # Voids 3 2 


 


  # Bowel Movements 1  














- Exam





In general patient is alert and oriented ?-3 in no distress


HEENT head normocephalic and atraumatic


Neck is supple no JVD no goiter no lymphadenopathy no carotid bruit


Chest examination is clear to auscultation no crackles no wheezing


Cardiac exam reveals regular heart sounds S1 and S2 no gallops no murmurs


Abdomen is soft nontender no organomegaly with normal bowel sounds


Extremity exam reveals no edema no cyanosis or clubbing


Neurological examination reveals no gross focal deficits





- Labs


CBC & Chem 7: 


                                 05/29/21 08:28





                                 05/29/21 08:28





Assessment and Plan


Assessment: 





1.  Alcohol dependence.  Alcohol withdrawal protocol initiated





2.  Suicidal ideation.  Psychiatry service is consulted.  Patient in suicide 

precautions





3.  Lower extremity swelling.  Venous Doppler d-dimer ordered





4.  Mild abdominal ascites and pain will order abdominal ultrasound.  Amylase 

and lipase within normal limits





5.  Hypertension urgency.  Home meds resumed.  Hydralazine when necessary added





6.  History of alcohol dependence





7.  History of CVA





8.  History of essential hypertension





9.  History of GERD





DVT prophylaxis Lovenox.  GI prophylaxis Protonix


Psychiatry service is consulted


Venous Doppler ordered


Abdominal ultrasound ordered


Troponin ordered


CK level ordered

## 2021-06-01 LAB
ALBUMIN SERPL-MCNC: 4.3 G/DL (ref 3.8–4.9)
ALBUMIN/GLOB SERPL: 1.79 G/DL (ref 1.6–3.17)
ALP SERPL-CCNC: 95 U/L (ref 41–126)
ALT SERPL-CCNC: 39 U/L (ref 10–49)
ANION GAP SERPL CALC-SCNC: 10 MMOL/L (ref 4–12)
AST SERPL-CCNC: 37 U/L (ref 14–35)
BASOPHILS # BLD AUTO: 0.06 X 10*3/UL (ref 0–0.1)
BASOPHILS NFR BLD AUTO: 0.8 %
BUN SERPL-SCNC: 24 MG/DL (ref 9–27)
BUN/CREAT SERPL: 20 RATIO (ref 12–20)
CALCIUM SPEC-MCNC: 8.8 MG/DL (ref 8.7–10.3)
CHLORIDE SERPL-SCNC: 106 MMOL/L (ref 96–109)
CO2 SERPL-SCNC: 28 MMOL/L (ref 21.6–31.8)
EOSINOPHIL # BLD AUTO: 0.17 X 10*3/UL (ref 0.04–0.35)
EOSINOPHIL NFR BLD AUTO: 2.1 %
ERYTHROCYTE [DISTWIDTH] IN BLOOD BY AUTOMATED COUNT: 5.37 X 10*6/UL (ref 4.4–5.6)
ERYTHROCYTE [DISTWIDTH] IN BLOOD: 14.1 % (ref 11.5–14.5)
GLOBULIN SER CALC-MCNC: 2.4 G/DL (ref 1.6–3.3)
GLUCOSE SERPL-MCNC: 97 MG/DL (ref 70–110)
HCT VFR BLD AUTO: 52.8 % (ref 39.6–50)
HGB BLD-MCNC: 16.5 G/DL (ref 13–17)
LYMPHOCYTES # SPEC AUTO: 2.04 X 10*3/UL (ref 0.9–5)
LYMPHOCYTES NFR SPEC AUTO: 25.5 %
MCH RBC QN AUTO: 30.7 PG (ref 27–32)
MCHC RBC AUTO-ENTMCNC: 31.3 G/DL (ref 32–37)
MCV RBC AUTO: 98.3 FL (ref 80–97)
MONOCYTES # BLD AUTO: 1.08 X 10*3/UL (ref 0.2–1)
MONOCYTES NFR BLD AUTO: 13.5 %
NEUTROPHILS # BLD AUTO: 4.54 X 10*3/UL (ref 1.8–7.7)
NEUTROPHILS NFR BLD AUTO: 56.7 %
PLATELET # BLD AUTO: 151 X 10*3/UL (ref 140–440)
POTASSIUM SERPL-SCNC: 3.7 MMOL/L (ref 3.5–5.5)
PROT SERPL-MCNC: 6.7 G/DL (ref 6.2–8.2)
SODIUM SERPL-SCNC: 144 MMOL/L (ref 135–145)
WBC # BLD AUTO: 8 X 10*3/UL (ref 4.5–10)

## 2021-06-01 RX ADMIN — FAMOTIDINE SCH MG: 20 TABLET, FILM COATED ORAL at 07:19

## 2021-06-01 RX ADMIN — NALTREXONE HYDROCHLORIDE SCH MG: 50 TABLET, FILM COATED ORAL at 07:19

## 2021-06-01 RX ADMIN — ENOXAPARIN SODIUM SCH: 40 INJECTION SUBCUTANEOUS at 09:53

## 2021-06-01 RX ADMIN — CEFAZOLIN SCH: 330 INJECTION, POWDER, FOR SOLUTION INTRAMUSCULAR; INTRAVENOUS at 07:22

## 2021-06-01 RX ADMIN — Medication SCH MG: at 17:02

## 2021-06-01 RX ADMIN — Medication SCH MG: at 07:19

## 2021-06-01 RX ADMIN — PANTOPRAZOLE SODIUM SCH MG: 40 TABLET, DELAYED RELEASE ORAL at 07:19

## 2021-06-01 RX ADMIN — FAMOTIDINE SCH MG: 20 TABLET, FILM COATED ORAL at 22:31

## 2021-06-01 NOTE — P.PN
Subjective


Progress Note Date: 06/01/21








This is a 70-year-old male patient who presented to the ER with concerns of 

alcohol intoxication and dependence and possible suicidal ideation.  Patient 

appears to be a poor historian unable to recall recent events.  According to ER 

report patient has been unable to take care of himself and was found in the 

fetal position living on his porch.   past medical history of CVA, GERD, 

hypertension, osteoarthritis, cholecystectomy and daily alcohol abuse.  At this 

time patient has been started on withdrawal protocol.  Patient currently in 

suicide precautions.  Consider is at bedside.  Psychiatry service is consulted. 

Bilateral lower extremity is warm and erythematous.  Venous Doppler and d-dimer 

ordered.  Blood pressure also elevated home meds resumed.  Patient complaining 

of nausea and vomiting amylase and lipase within normal limits.  Will order 

ultrasound of abdomen.  This time patient denies chest pain or shortness breath.

 Patient denies nausea vomiting or diarrhea.  Patient denies any urinary burning

or frequency.





On 05/28/2021 Patient was seen and examined on the medical floor, he is alert 

and oriented x 3 in no distress, he denies any complaints there is no fever or 

chills no headache or dizziness no chest pain no shortness of breath no 

palpitation no cough no nausea or vomiting no abdominal pain no diarrhea no 

blood in the stools no burning with urination no frequency or urgency and no 

hematuria, there is no weakness or numbness in any of the extremities no change 

in vision speech or gait.,  Patient is still having episodes of anxiety and mild

agitation, will continue with Ativan per protocol.  D-dimer was elevated on 

presentation bilateral lower extremity Doppler was negative for DVT, chest CTA 

was negative for pulmonary embolism.  Currently patient is maintained on Lovenox

for DVT prophylaxis





On 05/29/2021 patient is more somnolent today, he denies any complaints, there 

is no fever or chills no headache or dizziness no chest pain no shortness of 

breath no palpitation no cough no nausea or vomiting no abdominal pain no 

diarrhea no blood in the stools no burning with urination no frequency or 

urgency and no hematuria, at this time will continue with current management 

will recheck labs in a.m.





On 05/30/2021 Patient was seen and examined on the medical floor, he is alert 

and oriented x 3 in no distress, he is feeling tired and has generalized body 

aches otherwise he denies any complaints there is no fever or chills no headache

or dizziness no chest pain no shortness of breath no palpitation no cough no 

nausea or vomiting no abdominal pain no diarrhea no blood in the stools no 

burning with urination no frequency or urgency and no hematuria, there is no 

weakness or numbness in any of the extremities no change in vision speech or 

gait.





On 05/31/2021 Patient was seen and examined on the medical floor, he is alert 

and oriented x 3 in no distress, he denies any complaints there is no fever or 

chills no headache or dizziness no chest pain no shortness of breath no 

palpitation no cough no nausea or vomiting no abdominal pain no diarrhea no 

blood in the stools no burning with urination no frequency or urgency and no 

hematuria, at this time will increase mobility with physical therapy and 

occupational therapy, possible discharge to home in the next 1-2 days





On 06/01/2021 Patient was seen and examined on the medical floor, he is alert 

and oriented x 3 in no distress, he denies any complaints there is no fever or 

chills no headache or dizziness no chest pain no shortness of breath no 

palpitation no cough no nausea or vomiting no abdominal pain no diarrhea no 

blood in the stools no burning with urination no frequency or urgency and no 

hematuria, patient is complaining of generalized weakness and difficulty with 

his gait, will assess if we can transfer patient to a nursing home for 

rehabilitation in the next 24 hours, patient nursing home of choice is [facility].





Objective





- Vital Signs


Vital signs: 


                                   Vital Signs











Temp  100.0 F H  06/01/21 07:51


 


Pulse  61   06/01/21 07:51


 


Resp  20   06/01/21 07:51


 


BP  174/94   06/01/21 07:51


 


Pulse Ox  93 L  06/01/21 07:51








                                 Intake & Output











 05/31/21 06/01/21 06/01/21





 18:59 06:59 18:59


 


Output Total 400 300 


 


Balance -400 -300 


 


Output:   


 


  Urine 400 300 


 


Other:   


 


  Voiding Method Urinal Urinal Urinal





 Diaper Diaper Diaper


 


  # Voids 1 1 


 


  # Bowel Movements 1 1 














- Exam





In general patient is alert and oriented ?-3 in no distress


HEENT head normocephalic and atraumatic


Neck is supple no JVD no goiter no lymphadenopathy no carotid bruit


Chest examination is clear to auscultation no crackles no wheezing


Cardiac exam reveals regular heart sounds S1 and S2 no gallops no murmurs


Abdomen is soft nontender no organomegaly with normal bowel sounds


Extremity exam reveals no edema no cyanosis or clubbing


Neurological examination reveals no gross focal deficits





- Labs


CBC & Chem 7: 


                                 06/01/21 05:34





                                 06/01/21 05:34


Labs: 


                  Abnormal Lab Results - Last 24 Hours (Table)











  06/01/21 06/01/21 Range/Units





  05:34 05:34 


 


Hct  52.8 H   (39.6-50.0)  %


 


MCV  98.3 H   (80.0-97.0)  fL


 


MCHC  31.3 L   (32.0-37.0)  g/dL


 


Immature Gran #  0.11 H   (0.00-0.04)  X 10*3/uL


 


Monocytes #  1.08 H   (0.20-1.00)  X 10*3/uL


 


AST   37 H  (14-35)  U/L














Assessment and Plan


Assessment: 





1.  Alcohol dependence.  Alcohol withdrawal protocol initiated





2.  Suicidal ideation.  Psychiatry service is consulted.  Patient in suicide 

precautions





3.  Lower extremity swelling.  Venous Doppler d-dimer ordered





4.  Mild abdominal ascites and pain will order abdominal ultrasound.  Amylase 

and lipase within normal limits





5.  Hypertension urgency.  Home meds resumed.  Hydralazine when necessary added





6.  History of alcohol dependence





7.  History of CVA





8.  History of essential hypertension





9.  History of GERD





DVT prophylaxis Lovenox.  GI prophylaxis Protonix


Psychiatry service is consulted


Venous Doppler ordered


Abdominal ultrasound ordered


Troponin ordered


CK level ordered

## 2021-06-02 RX ADMIN — CEFAZOLIN SCH: 330 INJECTION, POWDER, FOR SOLUTION INTRAMUSCULAR; INTRAVENOUS at 10:23

## 2021-06-02 RX ADMIN — ENOXAPARIN SODIUM SCH: 40 INJECTION SUBCUTANEOUS at 10:31

## 2021-06-02 RX ADMIN — NALTREXONE HYDROCHLORIDE SCH MG: 50 TABLET, FILM COATED ORAL at 10:24

## 2021-06-02 RX ADMIN — FAMOTIDINE SCH MG: 20 TABLET, FILM COATED ORAL at 10:25

## 2021-06-02 RX ADMIN — Medication SCH MG: at 10:24

## 2021-06-02 RX ADMIN — ENOXAPARIN SODIUM SCH MG: 40 INJECTION SUBCUTANEOUS at 10:24

## 2021-06-02 RX ADMIN — Medication SCH MG: at 19:14

## 2021-06-02 RX ADMIN — PANTOPRAZOLE SODIUM SCH MG: 40 TABLET, DELAYED RELEASE ORAL at 10:24

## 2021-06-02 NOTE — P.DS
Providers


Date of admission: 


05/27/21 07:06





Expected date of discharge: 06/02/21


Attending physician: 


Rajinder Foreman





Consults: 





                                        





05/27/21 07:07


Consult Physician Routine 


   Consulting Provider: Noman Stokes


   Consult Reason/Comments: Alcohol dependence.  Suicidal ideation.


   Do you want consulting provider notified?: Yes











Primary care physician: 


Rajinder Foreman





[facility] Course: 





Discharge diagnosis





1.  Alcohol dependence.  Alcohol withdrawal protocol initiated





2.  Suicidal ideation.  Psychiatry service is consulted.  Patient in suicide 

precautions.  Patient was evaluated by psychiatry services patient does not meet

criteria for inpatient psych taken out of suicide precautions





3.  Lower extremity swelling.  Venous Doppler d-dimer ordered





4.  Mild abdominal ascites and pain will order abdominal ultrasound.  Amylase 

and lipase within normal limits





5.  Hypertension urgency.  Home meds resumed.  Hydralazine when necessary added





6.  History of alcohol dependence





7.  History of CVA





8.  History of essential hypertension





9.  History of GERD








Hospital course





This is a 70-year-old male patient who presented to the ER with concerns of 

alcohol intoxication and dependence and possible suicidal ideation.  Patient 

appears to be a poor historian unable to recall recent events.  According to ER 

report patient has been unable to take care of himself and was found in the 

fetal position living on his porch.   past medical history of CVA, GERD, 

hypertension, osteoarthritis, cholecystectomy and daily alcohol abuse.  At this 

time patient has been started on withdrawal protocol.  Patient currently in 

suicide precautions.  Consider is at bedside.  Psychiatry service is consulted. 

Bilateral lower extremity is warm and erythematous.  Venous Doppler and d-dimer 

ordered.  Blood pressure also elevated home meds resumed.  Patient complaining 

of nausea and vomiting amylase and lipase within normal limits.  Will order 

ultrasound of abdomen.  This time patient denies chest pain or shortness breath.

 Patient denies nausea vomiting or diarrhea.  Patient denies any urinary burning

or frequency.





On 05/28/2021 Patient was seen and examined on the medical floor, he is alert 

and oriented x 3 in no distress, he denies any complaints there is no fever or 

chills no headache or dizziness no chest pain no shortness of breath no 

palpitation no cough no nausea or vomiting no abdominal pain no diarrhea no 

blood in the stools no burning with urination no frequency or urgency and no 

hematuria, there is no weakness or numbness in any of the extremities no change 

in vision speech or gait.,  Patient is still having episodes of anxiety and mild

agitation, will continue with Ativan per protocol.  D-dimer was elevated on 

presentation bilateral lower extremity Doppler was negative for DVT, chest CTA 

was negative for pulmonary embolism.  Currently patient is maintained on Lovenox

for DVT prophylaxis





On 05/29/2021 patient is more somnolent today, he denies any complaints, there 

is no fever or chills no headache or dizziness no chest pain no shortness of 

breath no palpitation no cough no nausea or vomiting no abdominal pain no 

diarrhea no blood in the stools no burning with urination no frequency or 

urgency and no hematuria, at this time will continue with current management 

will recheck labs in a.m.





On 05/30/2021 Patient was seen and examined on the medical floor, he is alert 

and oriented x 3 in no distress, he is feeling tired and has generalized body 

aches otherwise he denies any complaints there is no fever or chills no headache

or dizziness no chest pain no shortness of breath no palpitation no cough no 

nausea or vomiting no abdominal pain no diarrhea no blood in the stools no 

burning with urination no frequency or urgency and no hematuria, there is no 

weakness or numbness in any of the extremities no change in vision speech or 

gait.





On 05/31/2021 Patient was seen and examined on the medical floor, he is alert an

d oriented x 3 in no distress, he denies any complaints there is no fever or 

chills no headache or dizziness no chest pain no shortness of breath no 

palpitation no cough no nausea or vomiting no abdominal pain no diarrhea no 

blood in the stools no burning with urination no frequency or urgency and no 

hematuria, at this time will increase mobility with physical therapy and 

occupational therapy, possible discharge to home in the next 1-2 days





On 06/01/2021 Patient was seen and examined on the medical floor, he is alert 

and oriented x 3 in no distress, he denies any complaints there is no fever or 

chills no headache or dizziness no chest pain no shortness of breath no 

palpitation no cough no nausea or vomiting no abdominal pain no diarrhea no 

blood in the stools no burning with urination no frequency or urgency and no 

hematuria, patient is complaining of generalized weakness and difficulty with 

his gait, will assess if we can transfer patient to a nursing home for 

rehabilitation in the next 24 hours, patient nursing home of choice is [facility] 

[place].








On 06/02/2071 patient is alert and oriented 3.  Patient complaining of 

generalized weakness.  Patient will be [facility]'d [facility] today per social work.  

Labs have remained stable.  Temperature 98.9 heart rate 60 respiratory rate 18 

blood pressure 159/71 O2 saturation of 98% on room air.  Patient denies chest 

pain or shortness of breath.  Patient denies nausea vomiting or diarrhea.  

Patient denies any urinary burning or frequency. 





Patient Condition at Discharge: Stable





Plan - Discharge Summary


Discharge Rx Participant: No


New Discharge Prescriptions: 


New


   amLODIPine [Norvasc] 5 mg PO DAILY  tab


   atenoloL [Tenormin] 25 mg PO BID  tab


   Naltrexone HCl [Revia] 50 mg PO DAILY  tab


   Thiamine [Vitamin B-1] 100 mg PO BID-W/MEALS  tab


   lisinopriL [Zestril] 20 mg PO BID  tab


Discharge Medication List





Naltrexone HCl [Revia] 50 mg PO DAILY  tab 06/02/21 [Rx]


Thiamine [Vitamin B-1] 100 mg PO BID-W/MEALS  tab 06/02/21 [Rx]


amLODIPine [Norvasc] 5 mg PO DAILY  tab 06/02/21 [Rx]


atenoloL [Tenormin] 25 mg PO BID  tab 06/02/21 [Rx]


lisinopriL [Zestril] 20 mg PO BID  tab 06/02/21 [Rx]








Follow up Appointment(s)/Referral(s): 


Rajinder Foreman MD [Primary Care Provider] - 06/11/21 11:00 am


Activity/Diet/Wound Care/Special Instructions: 


Activity as tolerated








Diet heart healthy


Discharge Disposition: TRANSFER TO SNF/ECF

## 2021-06-02 NOTE — US
EXAMINATION TYPE: US carotid duplex BILAT

 

DATE OF EXAM: 6/2/2021

 

COMPARISON: NONE

 

CLINICAL HISTORY: dizziness. Dizziness

 

EXAM MEASUREMENTS: 

 

RIGHT:  Peak Systolic Velocity (PSV) cm/sec

----- Right CCA:  88.6  

----- Right ICA:  117.6     

----- Right ECA:  190.3   

ICA/CCA ratio:  1.3    

 

RIGHT:  End Diastole cm/sec

----- Right CCA:  12.9   

----- Right ICA:  28.9      

----- Right ECA:  9.5     

 

LEFT:  Peak Systolic Velocity (PSV) cm/sec

----- Left CCA:  104.8  

----- Left ICA:  122.6   

----- Left ECA:  108.2  

ICA/CCA ratio:  1.2  

 

LEFT:  End Diastole cm/sec

----- Left CCA:  14.4  

----- Left ICA:  22.5   

----- Left ECA:  0

 

VERTEBRALS (direction of flow):

Right Vertebral: Antegrade

Left Vertebral: Antegrade

 

Rhythm:  Normal

 

No significant stenosis seen

 

 

 

IMPRESSION:  

1. No significant hemodynamic stenosis identified.

2. Atherosclerotic plaque.   

 

 

Criteria for Assigning % of Stenosis / Diameter reduction

(Estimation based on the indirect measurements of the internal carotid artery velocities (ICA PSV).

1.  Normal (no stenosis)=ICA PSV < 125 cm/s: ratio < 2.0: ICA EDV<40 cm/s.

2. Less than 50% stenosis=ICA PSV < 125 cm/s: ratio < 2.0: ICA EDV<40 cm/s.

3.  50 to 69% stenosis=ICA PSV of 125 to 230 cm/s: ration 2.0 ? 4.0: ICA EDV  cm/s.

4.  Greater than 70% stenosis to near occlusion= ICA PSV > 230 cm/s: ratio > 4.0: ICA EDV > 100 cm/s.
 

5.  Near occlusion= ICA PSV velocities may be low or undetectable: variable ratio and ICA EDV.

6.  Total occlusion=unable to detect flow.

## 2021-06-03 VITALS
HEART RATE: 53 BPM | TEMPERATURE: 98.3 F | RESPIRATION RATE: 16 BRPM | SYSTOLIC BLOOD PRESSURE: 117 MMHG | DIASTOLIC BLOOD PRESSURE: 60 MMHG

## 2021-06-03 LAB
ALBUMIN SERPL-MCNC: 3.5 G/DL (ref 3.5–5)
ALBUMIN/GLOB SERPL: 1.3 {RATIO}
ALP SERPL-CCNC: 81 U/L (ref 38–126)
ALT SERPL-CCNC: 31 U/L (ref 4–49)
ANION GAP SERPL CALC-SCNC: 5 MMOL/L
AST SERPL-CCNC: 38 U/L (ref 17–59)
BASOPHILS # BLD AUTO: 0.07 X 10*3/UL (ref 0–0.1)
BASOPHILS NFR BLD AUTO: 1.2 %
BUN SERPL-SCNC: 25 MG/DL (ref 9–20)
CALCIUM SPEC-MCNC: 8.4 MG/DL (ref 8.4–10.2)
CHLORIDE SERPL-SCNC: 108 MMOL/L (ref 98–107)
CO2 SERPL-SCNC: 28 MMOL/L (ref 22–30)
EOSINOPHIL # BLD AUTO: 0.21 X 10*3/UL (ref 0.04–0.35)
EOSINOPHIL NFR BLD AUTO: 3.6 %
ERYTHROCYTE [DISTWIDTH] IN BLOOD BY AUTOMATED COUNT: 4.75 X 10*6/UL (ref 4.4–5.6)
ERYTHROCYTE [DISTWIDTH] IN BLOOD: 14 % (ref 11.5–14.5)
GLOBULIN SER CALC-MCNC: 2.6 G/DL
GLUCOSE SERPL-MCNC: 91 MG/DL (ref 74–99)
HCT VFR BLD AUTO: 46.8 % (ref 39.6–50)
HGB BLD-MCNC: 14.7 G/DL (ref 13–17)
LYMPHOCYTES # SPEC AUTO: 1.48 X 10*3/UL (ref 0.9–5)
LYMPHOCYTES NFR SPEC AUTO: 25.3 %
MCH RBC QN AUTO: 30.9 PG (ref 27–32)
MCHC RBC AUTO-ENTMCNC: 31.4 G/DL (ref 32–37)
MCV RBC AUTO: 98.5 FL (ref 80–97)
MONOCYTES # BLD AUTO: 0.79 X 10*3/UL (ref 0.2–1)
MONOCYTES NFR BLD AUTO: 13.5 %
NEUTROPHILS # BLD AUTO: 3.21 X 10*3/UL (ref 1.8–7.7)
NEUTROPHILS NFR BLD AUTO: 55 %
PLATELET # BLD AUTO: 201 X 10*3/UL (ref 140–440)
POTASSIUM SERPL-SCNC: 4.4 MMOL/L (ref 3.5–5.1)
PROT SERPL-MCNC: 6.1 G/DL (ref 6.3–8.2)
SODIUM SERPL-SCNC: 141 MMOL/L (ref 137–145)
WBC # BLD AUTO: 5.84 X 10*3/UL (ref 4.5–10)

## 2021-06-03 RX ADMIN — ENOXAPARIN SODIUM SCH: 40 INJECTION SUBCUTANEOUS at 07:51

## 2021-06-03 RX ADMIN — PANTOPRAZOLE SODIUM SCH MG: 40 TABLET, DELAYED RELEASE ORAL at 07:45

## 2021-06-03 RX ADMIN — ENOXAPARIN SODIUM SCH MG: 40 INJECTION SUBCUTANEOUS at 07:45

## 2021-06-03 RX ADMIN — NALTREXONE HYDROCHLORIDE SCH MG: 50 TABLET, FILM COATED ORAL at 07:45

## 2021-06-03 RX ADMIN — Medication SCH MG: at 07:45

## 2021-06-03 NOTE — P.DS
Providers


Date of admission: 


05/27/21 07:06





Expected date of discharge: 06/03/21


Attending physician: 


Rajinder Foreman





Consults: 





                                        





05/27/21 07:07


Consult Physician Routine 


   Consulting Provider: Noman Stokes


   Consult Reason/Comments: Alcohol dependence.  Suicidal ideation.


   Do you want consulting provider notified?: Yes





06/02/21 15:30


Consult Physician Routine 


   Consulting Provider: Ebony Peng


   Consult Reason/Comments: dizziness


   Do you want consulting provider notified?: Yes











Primary care physician: 


Rajinder Ahsan





[facility] Course: 








Discharge diagnosis





1.  Alcohol dependence.  Alcohol withdrawal protocol initiated





2.  Suicidal ideation.  Psychiatry service is consulted.  Patient in suicide 

precautions.  Patient was evaluated by psychiatry services patient does not meet

criteria for inpatient psych taken out of suicide precautions





3.  Lower extremity swelling.  Venous Doppler d-dimer ordered





4.  Mild abdominal ascites and pain will order abdominal ultrasound.  Amylase 

and lipase within normal limits





5.  Hypertension urgency.  Home meds resumed.  Hydralazine when necessary added





6.  History of alcohol dependence





7.  History of CVA





8.  History of essential hypertension





9.  History of GERD











Hospital course





This is a 70-year-old male patient who presented to the ER with concerns of 

alcohol intoxication and dependence and possible suicidal ideation.  Patient 

appears to be a poor historian unable to recall recent events.  According to ER 

report patient has been unable to take care of himself and was found in the 

fetal position living on his porch.   past medical history of CVA, GERD, 

hypertension, osteoarthritis, cholecystectomy and daily alcohol abuse.  At this 

time patient has been started on withdrawal protocol.  Patient currently in 

suicide precautions.  Consider is at bedside.  Psychiatry service is consulted. 

Bilateral lower extremity is warm and erythematous.  Venous Doppler and d-dimer 

ordered.  Blood pressure also elevated home meds resumed.  Patient complaining 

of nausea and vomiting amylase and lipase within normal limits.  Will order 

ultrasound of abdomen.  This time patient denies chest pain or shortness breath.

 Patient denies nausea vomiting or diarrhea.  Patient denies any urinary burning

or frequency.





On 05/28/2021 Patient was seen and examined on the medical floor, he is alert 

and oriented x 3 in no distress, he denies any complaints there is no fever or 

chills no headache or dizziness no chest pain no shortness of breath no 

palpitation no cough no nausea or vomiting no abdominal pain no diarrhea no 

blood in the stools no burning with urination no frequency or urgency and no 

hematuria, there is no weakness or numbness in any of the extremities no change 

in vision speech or gait.,  Patient is still having episodes of anxiety and mild

agitation, will continue with Ativan per protocol.  D-dimer was elevated on 

presentation bilateral lower extremity Doppler was negative for DVT, chest CTA 

was negative for pulmonary embolism.  Currently patient is maintained on Lovenox

for DVT prophylaxis





On 05/29/2021 patient is more somnolent today, he denies any complaints, there 

is no fever or chills no headache or dizziness no chest pain no shortness of 

breath no palpitation no cough no nausea or vomiting no abdominal pain no 

diarrhea no blood in the stools no burning with urination no frequency or 

urgency and no hematuria, at this time will continue with current management 

will recheck labs in a.m.





On 05/30/2021 Patient was seen and examined on the medical floor, he is alert 

and oriented x 3 in no distress, he is feeling tired and has generalized body 

aches otherwise he denies any complaints there is no fever or chills no headache

or dizziness no chest pain no shortness of breath no palpitation no cough no 

nausea or vomiting no abdominal pain no diarrhea no blood in the stools no 

burning with urination no frequency or urgency and no hematuria, there is no 

weakness or numbness in any of the extremities no change in vision speech or 

gait.





On 05/31/2021 Patient was seen and examined on the medical floor, he is alert 

and oriented x 3 in no distress, he denies any complaints there is no fever or 

chills no headache or dizziness no chest pain no shortness of breath no 

palpitation no cough no nausea or vomiting no abdominal pain no diarrhea no 

blood in the stools no burning with urination no frequency or urgency and no 

hematuria, at this time will increase mobility with physical therapy and 

occupational therapy, possible discharge to home in the next 1-2 days





On 06/01/2021 Patient was seen and examined on the medical floor, he is alert 

and oriented x 3 in no distress, he denies any complaints there is no fever or 

chills no headache or dizziness no chest pain no shortness of breath no 

palpitation no cough no nausea or vomiting no abdominal pain no diarrhea no 

blood in the stools no burning with urination no frequency or urgency and no 

hematuria, patient is complaining of generalized weakness and difficulty with 

his gait, will assess if we can transfer patient to a nursing home for 

rehabilitation in the next 24 hours, patient nursing home of choice is [facility].








On 06/02/2071 patient is alert and oriented 3.  Patient complaining of 

generalized weakness.  Patient will be DC'd to [facility] today per social work.  

Labs have remained stable.  Temperature 98.9 heart rate 60 respiratory rate 18 

blood pressure 159/71 O2 saturation of 98% on room air.  Patient denies chest 

pain or shortness of breath.  Patient denies nausea vomiting or diarrhea.   

Patient denies any urinary burning or frequency. 





On 06/03/2021 patient was seen and examined on the medical floor, he is alert 

and oriented 3 in no apparent distress, yesterday she was scheduled for 

discharge to [facility] on the [facility] for rehabilitation however he was 

having severe dizziness when he stood up discharge was canceled echocardiogram 

and carotid Doppler were done and patient was evaluated by cardiology.  Today 

patient is feeling better, he denies any dizziness, echocardiogram and carotid 

Doppler were within normal limits, patient was evaluated by cardiology and was 

cleared for discharge, he will be transferred today to [facility] for 

rehabilitation.


Patient Condition at Discharge: Stable





Plan - Discharge Summary


Discharge Rx Participant: No


New Discharge Prescriptions: 


New


   amLODIPine [Norvasc] 5 mg PO DAILY  tab


   atenoloL [Tenormin] 25 mg PO BID  tab


   Naltrexone HCl [Revia] 50 mg PO DAILY  tab


   Thiamine [Vitamin B-1] 100 mg PO BID-W/MEALS  tab


   lisinopriL [Zestril] 20 mg PO BID  tab


   Pantoprazole [Protonix] 40 mg PO [id number]-[id number]  tablet.


Discharge Medication List





Naltrexone HCl [Revia] 50 mg PO DAILY  tab 06/02/21 [Rx]


Thiamine [Vitamin B-1] 100 mg PO BID-W/MEALS  tab 06/02/21 [Rx]


amLODIPine [Norvasc] 5 mg PO DAILY  tab 06/02/21 [Rx]


atenoloL [Tenormin] 25 mg PO BID  tab 06/02/21 [Rx]


lisinopriL [Zestril] 20 mg PO BID  tab 06/02/21 [Rx]


Pantoprazole [Protonix] 40 mg PO [id number]  tablet. 06/03/21 [Rx]








Follow up Appointment(s)/Referral(s): 


Rajinder Foreman MD [Primary Care Provider] - 06/11/21 11:00 am


Steven Yañez MD [STAFF PHYSICIAN] - 2 Weeks


Activity/Diet/Wound Care/Special Instructions: 


Activity as tolerated








Diet heart healthy


Discharge Disposition: TRANSFER TO SNF/ECF
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
## 2021-06-03 NOTE — ECHOF
Referral Reason:dizziness



MEASUREMENTS

--------

HEIGHT: 177.8 cm

WEIGHT: 95.3 kg

BP: 144/73

IVSd:   1.8 cm     (0.6 - 1.1)

LVIDd:   4.4 cm     (3.9 - 5.3)

LVPWd:   1.8 cm     (0.6 - 1.1)

IVSs:   2.5 cm

LVIDs:   1.9 cm

LVPWs:   2.6 cm

LA Diam:   4.3 cm     (2.7 - 3.8)

Ao Diam:   3.4 cm     (2.0 - 3.7)

AV Cusp:   2.5 cm     (1.5 - 2.6)

MV EXCURSION:   19.297 mm     (> 18.000)

MV EF SLOPE:   50 mm/s     (70 - 150)

EPSS:   0.4 cm

MV E Alexis:   0.35 m/s

MV DecT:   271 ms

MV A Alexis:   0.72 m/s

MV E/A Ratio:   0.49 

RAP:   5.00 mmHg

RVSP:   33.44 mmHg







FINDINGS

--------

Sinus rhythm.

This was a technically adequate study.

The left ventricular size is normal.   There is severe concentric left ventricular hypertrophy.   Ove
rall left ventricular systolic function is normal with, an EF between 55 - 60 %.

The right ventricle is normal in size.

The left atrium is mildly dilated.

The right atrium was not well visualized.

Interatrial and interventricular septum intact.

The aortic valve was not well visualized.   There is no evidence of aortic regurgitation.   There is 
no evidence of aortic stenosis.

No mitral regurgitation.

Mild tricuspid regurgitation present.   There is no evidence of pulmonary hypertension.   The right v
entricular systolic pressure, as measured by Doppler, is 33.44mmHg.

There is no pulmonic regurgitation present.

The aortic root size is normal.

Normal inferior vena cava with normal inspiratory collapse consistent with estimated right atrial pre
ssure of  5 mmHg.

There is no pericardial effusion.



CONCLUSIONS

--------

1. The left ventricular size is normal.

2. There is severe concentric left ventricular hypertrophy.

3. Overall left ventricular systolic function is normal with, an EF between 55 - 60 %.

4. The left atrium is mildly dilated.

5. Mild tricuspid regurgitation present.





SONOGRAPHER: Ivelisse Reyes [facility]

## 2021-07-06 ENCOUNTER — HOSPITAL ENCOUNTER (INPATIENT)
Dept: HOSPITAL 47 - EC | Age: 71
LOS: 7 days | Discharge: HOME | DRG: 897 | End: 2021-07-13
Attending: INTERNAL MEDICINE | Admitting: INTERNAL MEDICINE
Payer: MEDICARE

## 2021-07-06 VITALS — BODY MASS INDEX: 27.3 KG/M2

## 2021-07-06 DIAGNOSIS — Z71.41: ICD-10-CM

## 2021-07-06 DIAGNOSIS — I10: ICD-10-CM

## 2021-07-06 DIAGNOSIS — F10.239: ICD-10-CM

## 2021-07-06 DIAGNOSIS — M19.90: ICD-10-CM

## 2021-07-06 DIAGNOSIS — M10.9: ICD-10-CM
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
## 2021-07-06 DIAGNOSIS — S50.312A: ICD-10-CM

## 2021-07-06 DIAGNOSIS — F41.9: ICD-10-CM

## 2021-07-06 DIAGNOSIS — F43.23: ICD-10-CM

## 2021-07-06 DIAGNOSIS — F32.9: ICD-10-CM

## 2021-07-06 DIAGNOSIS — Z79.899: ICD-10-CM

## 2021-07-06 DIAGNOSIS — Z90.49: ICD-10-CM

## 2021-07-06 DIAGNOSIS — Z82.5: ICD-10-CM

## 2021-07-06 DIAGNOSIS — R45.851: ICD-10-CM

## 2021-07-06 DIAGNOSIS — K21.9: ICD-10-CM

## 2021-07-06 DIAGNOSIS — Z86.73: ICD-10-CM

## 2021-07-06 DIAGNOSIS — H93.13: ICD-10-CM

## 2021-07-06 DIAGNOSIS — Z82.49: ICD-10-CM

## 2021-07-06 DIAGNOSIS — F10.229: Primary | ICD-10-CM

## 2021-07-06 DIAGNOSIS — K04.7: ICD-10-CM

## 2021-07-06 LAB
ALBUMIN SERPL-MCNC: 4.5 G/DL (ref 3.5–5)
ALP SERPL-CCNC: 103 U/L (ref 38–126)
ALT SERPL-CCNC: 38 U/L (ref 4–49)
ANION GAP SERPL CALC-SCNC: 16 MMOL/L
AST SERPL-CCNC: 78 U/L (ref 17–59)
BASOPHILS # BLD AUTO: 0.1 K/UL (ref 0–0.2)
BASOPHILS NFR BLD AUTO: 1 %
BUN SERPL-SCNC: 11 MG/DL (ref 9–20)
CALCIUM SPEC-MCNC: 9 MG/DL (ref 8.4–10.2)
CHLORIDE SERPL-SCNC: 103 MMOL/L (ref 98–107)
CK SERPL-CCNC: 237 U/L (ref 55–170)
CO2 SERPL-SCNC: 21 MMOL/L (ref 22–30)
EOSINOPHIL # BLD AUTO: 0.1 K/UL (ref 0–0.7)
EOSINOPHIL NFR BLD AUTO: 2 %
ERYTHROCYTE [DISTWIDTH] IN BLOOD BY AUTOMATED COUNT: 5.76 M/UL (ref 4.3–5.9)
ERYTHROCYTE [DISTWIDTH] IN BLOOD: 14.5 % (ref 11.5–15.5)
GLUCOSE SERPL-MCNC: 113 MG/DL (ref 74–99)
HCT VFR BLD AUTO: 52.7 % (ref 39–53)
HGB BLD-MCNC: 18.2 GM/DL (ref 13–17.5)
INR PPP: 1 (ref ?–1.2)
LIPASE SERPL-CCNC: 122 U/L (ref 23–300)
LYMPHOCYTES # SPEC AUTO: 1.6 K/UL (ref 1–4.8)
LYMPHOCYTES NFR SPEC AUTO: 28 %
MAGNESIUM SPEC-SCNC: 2.2 MG/DL (ref 1.6–2.3)
MCH RBC QN AUTO: 31.7 PG (ref 25–35)
MCHC RBC AUTO-ENTMCNC: 34.6 G/DL (ref 31–37)
MCV RBC AUTO: 91.5 FL (ref 80–100)
MONOCYTES # BLD AUTO: 0.4 K/UL (ref 0–1)
MONOCYTES NFR BLD AUTO: 6 %
NEUTROPHILS # BLD AUTO: 3.4 K/UL (ref 1.3–7.7)
NEUTROPHILS NFR BLD AUTO: 61 %
PLATELET # BLD AUTO: 112 K/UL (ref 150–450)
POTASSIUM SERPL-SCNC: 4.8 MMOL/L (ref 3.5–5.1)
PROT SERPL-MCNC: 7.3 G/DL (ref 6.3–8.2)
PT BLD: 10.6 SEC (ref 9–12)
SODIUM SERPL-SCNC: 140 MMOL/L (ref 137–145)
WBC # BLD AUTO: 5.5 K/UL (ref 3.8–10.6)

## 2021-07-06 PROCEDURE — 82550 ASSAY OF CK (CPK): CPT

## 2021-07-06 PROCEDURE — 80053 COMPREHEN METABOLIC PANEL: CPT

## 2021-07-06 PROCEDURE — 85610 PROTHROMBIN TIME: CPT

## 2021-07-06 PROCEDURE — 99285 EMERGENCY DEPT VISIT HI MDM: CPT

## 2021-07-06 PROCEDURE — 82140 ASSAY OF AMMONIA: CPT

## 2021-07-06 PROCEDURE — 90715 TDAP VACCINE 7 YRS/> IM: CPT

## 2021-07-06 PROCEDURE — 80320 DRUG SCREEN QUANTALCOHOLS: CPT

## 2021-07-06 PROCEDURE — 36415 COLL VENOUS BLD VENIPUNCTURE: CPT

## 2021-07-06 PROCEDURE — 90471 IMMUNIZATION ADMIN: CPT

## 2021-07-06 PROCEDURE — 83690 ASSAY OF LIPASE: CPT

## 2021-07-06 PROCEDURE — 83735 ASSAY OF MAGNESIUM: CPT

## 2021-07-06 PROCEDURE — 85025 COMPLETE CBC W/AUTO DIFF WBC: CPT
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
## 2021-07-07 LAB
ALBUMIN SERPL-MCNC: 4.1 G/DL (ref 3.5–5)
ALBUMIN/GLOB SERPL: 1.7 {RATIO}
ALP SERPL-CCNC: 95 U/L (ref 38–126)
ALT SERPL-CCNC: 30 U/L (ref 4–49)
ANION GAP SERPL CALC-SCNC: 8 MMOL/L
AST SERPL-CCNC: 59 U/L (ref 17–59)
BASOPHILS # BLD AUTO: 0.1 K/UL (ref 0–0.2)
BASOPHILS NFR BLD AUTO: 1 %
BUN SERPL-SCNC: 12 MG/DL (ref 9–20)
CALCIUM SPEC-MCNC: 8.4 MG/DL (ref 8.4–10.2)
CHLORIDE SERPL-SCNC: 104 MMOL/L (ref 98–107)
CO2 SERPL-SCNC: 28 MMOL/L (ref 22–30)
EOSINOPHIL # BLD AUTO: 0.1 K/UL (ref 0–0.7)
EOSINOPHIL NFR BLD AUTO: 1 %
ERYTHROCYTE [DISTWIDTH] IN BLOOD BY AUTOMATED COUNT: 5.3 M/UL (ref 4.3–5.9)
ERYTHROCYTE [DISTWIDTH] IN BLOOD: 15.2 % (ref 11.5–15.5)
GLOBULIN SER CALC-MCNC: 2.4 G/DL
GLUCOSE SERPL-MCNC: 109 MG/DL (ref 74–99)
HCT VFR BLD AUTO: 49 % (ref 39–53)
HGB BLD-MCNC: 16.6 GM/DL (ref 13–17.5)
LYMPHOCYTES # SPEC AUTO: 1 K/UL (ref 1–4.8)
LYMPHOCYTES NFR SPEC AUTO: 15 %
MCH RBC QN AUTO: 31.3 PG (ref 25–35)
MCHC RBC AUTO-ENTMCNC: 33.8 G/DL (ref 31–37)
MCV RBC AUTO: 92.6 FL (ref 80–100)
MONOCYTES # BLD AUTO: 0.4 K/UL (ref 0–1)
MONOCYTES NFR BLD AUTO: 6 %
NEUTROPHILS # BLD AUTO: 5.2 K/UL (ref 1.3–7.7)
NEUTROPHILS NFR BLD AUTO: 76 %
PLATELET # BLD AUTO: 77 K/UL (ref 150–450)
POTASSIUM SERPL-SCNC: 4.1 MMOL/L (ref 3.5–5.1)
PROT SERPL-MCNC: 6.5 G/DL (ref 6.3–8.2)
SODIUM SERPL-SCNC: 140 MMOL/L (ref 137–145)
WBC # BLD AUTO: 6.9 K/UL (ref 3.8–10.6)

## 2021-07-07 RX ADMIN — Medication SCH MG: at 17:58

## 2021-07-07 RX ADMIN — Medication SCH MG: at 09:58

## 2021-07-07 RX ADMIN — Medication SCH: at 03:04

## 2021-07-07 RX ADMIN — ACETAMINOPHEN PRN MG: 500 TABLET ORAL at 21:15

## 2021-07-07 RX ADMIN — ACETAMINOPHEN PRN MG: 500 TABLET ORAL at 09:47
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
## 2021-07-08 LAB
ALBUMIN SERPL-MCNC: 3.7 G/DL (ref 3.8–4.9)
ALBUMIN/GLOB SERPL: 1.76 G/DL (ref 1.6–3.17)
ALP SERPL-CCNC: 83 U/L (ref 41–126)
ALT SERPL-CCNC: 26 U/L (ref 10–49)
ANION GAP SERPL CALC-SCNC: 8.7 MMOL/L (ref 4–12)
AST SERPL-CCNC: 37 U/L (ref 14–35)
BASOPHILS # BLD AUTO: 0 K/UL (ref 0–0.2)
BASOPHILS NFR BLD AUTO: 1 %
BUN SERPL-SCNC: 13 MG/DL (ref 9–27)
BUN/CREAT SERPL: 13 RATIO (ref 12–20)
CALCIUM SPEC-MCNC: 8.2 MG/DL (ref 8.7–10.3)
CHLORIDE SERPL-SCNC: 103 MMOL/L (ref 96–109)
CO2 SERPL-SCNC: 27.3 MMOL/L (ref 21.6–31.8)
EOSINOPHIL # BLD AUTO: 0.1 K/UL (ref 0–0.7)
EOSINOPHIL NFR BLD AUTO: 2 %
ERYTHROCYTE [DISTWIDTH] IN BLOOD BY AUTOMATED COUNT: 4.69 M/UL (ref 4.3–5.9)
ERYTHROCYTE [DISTWIDTH] IN BLOOD: 15.3 % (ref 11.5–15.5)
GLOBULIN SER CALC-MCNC: 2.1 G/DL (ref 1.6–3.3)
GLUCOSE SERPL-MCNC: 116 MG/DL (ref 70–110)
HCT VFR BLD AUTO: 44.2 % (ref 39–53)
HGB BLD-MCNC: 15 GM/DL (ref 13–17.5)
LYMPHOCYTES # SPEC AUTO: 0.8 K/UL (ref 1–4.8)
LYMPHOCYTES NFR SPEC AUTO: 14 %
MCH RBC QN AUTO: 31.9 PG (ref 25–35)
MCHC RBC AUTO-ENTMCNC: 33.9 G/DL (ref 31–37)
MCV RBC AUTO: 94.2 FL (ref 80–100)
MONOCYTES # BLD AUTO: 0.3 K/UL (ref 0–1)
MONOCYTES NFR BLD AUTO: 6 %
NEUTROPHILS # BLD AUTO: 4.2 K/UL (ref 1.3–7.7)
NEUTROPHILS NFR BLD AUTO: 76 %
PLATELET # BLD AUTO: 65 K/UL (ref 150–450)
POTASSIUM SERPL-SCNC: 3.7 MMOL/L (ref 3.5–5.5)
PROT SERPL-MCNC: 5.8 G/DL (ref 6.2–8.2)
SODIUM SERPL-SCNC: 139 MMOL/L (ref 135–145)
WBC # BLD AUTO: 5.5 K/UL (ref 3.8–10.6)

## 2021-07-08 RX ADMIN — PANTOPRAZOLE SODIUM SCH MG: 40 TABLET, DELAYED RELEASE ORAL at 07:55

## 2021-07-08 RX ADMIN — ENOXAPARIN SODIUM SCH MG: 40 INJECTION SUBCUTANEOUS at 07:54

## 2021-07-08 RX ADMIN — Medication SCH MG: at 16:59

## 2021-07-08 RX ADMIN — Medication SCH MG: at 07:55

## 2021-07-08 NOTE — P.PN
Subjective


Progress Note Date: 07/08/21








This is a 70-year-old male patient who presented to the ER after being found 

laying on his floor in the house.  Patient reports he had been drinking due to 

recent death of his second wife and was feeling depressed and suicidal.  Patient

reports that he had recently tried to stop drinking but became shaky so he 

started drinking so the shaking would stop.  Patient has a past medical history 

of CVA, GERD, hypertension, osteoarthritis who cholecystectomy, hernia repair, 

anxiety, depression and heavy alcohol abuse.  Patient has been started on 

alcohol withdrawal protocol.  Home medications resumed including blood pressure 

medication.  Patient is maintained and suicide precautions sitter is at bedside.

 Psychiatry service is consulted.  This time patient denies chest pain or 

shortness of breath.  Patient denies nausea vomiting or diarrhea.  Patient 

denies any urinary burning or frequency





On 07/08/2021 patient was seen and examined on the medical floor, he is alert 

and oriented x 3 in no distress, he denies any complaints there is no fever or 

chills no headache or dizziness no chest pain no shortness of breath no 

palpitation no cough no nausea or vomiting no abdominal pain no diarrhea no bloo

d in the stools no burning with urination no frequency or urgency and no 

hematuria, there is no weakness or numbness in any of the extremities no change 

in vision speech or gait.  He is calm and does not look agitated at this time, 

however he stated that he is feeling anxious and is requesting Ativan, otherwise

he denies any complaints.





Objective





- Vital Signs


Vital signs: 


                                   Vital Signs











Temp  98.5 F   07/08/21 11:55


 


Pulse  75   07/08/21 11:55


 


Resp  18   07/08/21 11:55


 


BP  169/93   07/08/21 11:55


 


Pulse Ox  96   07/08/21 11:55








                                 Intake & Output











 07/07/21 07/08/21 07/08/21





 18:59 06:59 18:59


 


Intake Total  1540 


 


Balance  1540 


 


Intake:   


 


  Intake, IV Titration  950 





  Amount   


 


    Sodium Chloride 0.9% 1,  950 





    000 ml @ 100 mls/hr IV .   





    Q10H7M ONE with Mvi,   





    Adult No.4 with Vit K 10   





    ml with Thiamine 100 mg   





    with Folic Acid 1 mg Rx#:   





    [id number]   


 


  Oral  590 


 


Other:   


 


  Voiding Method Urinal Urinal Urinal





   Diaper


 


  # Bowel Movements 1  














- Exam





In general patient is alert and oriented x 3 in no distress


HEENT head normocephalic and atraumatic


Neck is supple no JVD no goiter no lymphadenopathy no carotid bruit


Chest examination is clear to auscultation no crackles no wheezing


Cardiac exam reveals regular heart sounds S1 and S2 no gallops no murmurs


Abdomen is soft nontender no organomegaly with normal bowel sounds


Extremity exam reveals no edema no cyanosis or clubbing


Neurological examination reveals no gross focal deficits





- Labs


CBC & Chem 7: 


                                 07/08/21 05:21





                                 07/08/21 05:21


Labs: 


                  Abnormal Lab Results - Last 24 Hours (Table)











  07/08/21 07/08/21 Range/Units





  05:21 05:21 


 


Plt Count  65 L   (150-450)  k/uL


 


Lymphocytes #  0.8 L   (1.0-4.8)  k/uL


 


Glucose   116 H  ()  mg/dL


 


Calcium   8.2 L  (8.7-10.3)  mg/dL


 


AST   37 H  (14-35)  U/L


 


Total Protein   5.8 L  (6.2-8.2)  g/dL


 


Albumin   3.70 L  (3.80-4.90)  g/dL














Assessment and Plan


Assessment: 





1.  Alcohol dependence.  Patient started alcohol withdrawal protocol





2.  Suicidal ideation.  Patient maintained on suicide precautions psychiatry 

service is consulted





3.  Essential hypertension.  Home meds resumed





4.  History of CVA





5.  Alcohol dependence





6.  History of GERD








DVT prophylaxis Lovenox.  GI prophylaxis Protonix


Psychiatry service is consulted


Patient maintained in suicide precautions


Alcohol withdrawal protocol


Repeat labs ordered

## 2021-07-09 LAB
ALBUMIN SERPL-MCNC: 3.7 G/DL (ref 3.5–5)
ALBUMIN/GLOB SERPL: 1.4 {RATIO}
ALP SERPL-CCNC: 71 U/L (ref 38–126)
ALT SERPL-CCNC: 19 U/L (ref 4–49)
ANION GAP SERPL CALC-SCNC: 6 MMOL/L
AST SERPL-CCNC: 33 U/L (ref 17–59)
BASOPHILS # BLD AUTO: 0.1 K/UL (ref 0–0.2)
BASOPHILS NFR BLD AUTO: 1 %
BUN SERPL-SCNC: 18 MG/DL (ref 9–20)
CALCIUM SPEC-MCNC: 8.8 MG/DL (ref 8.4–10.2)
CHLORIDE SERPL-SCNC: 107 MMOL/L (ref 98–107)
CO2 SERPL-SCNC: 28 MMOL/L (ref 22–30)
EOSINOPHIL # BLD AUTO: 0.1 K/UL (ref 0–0.7)
EOSINOPHIL NFR BLD AUTO: 2 %
ERYTHROCYTE [DISTWIDTH] IN BLOOD BY AUTOMATED COUNT: 4.97 M/UL (ref 4.3–5.9)
ERYTHROCYTE [DISTWIDTH] IN BLOOD: 15.1 % (ref 11.5–15.5)
GLOBULIN SER CALC-MCNC: 2.7 G/DL
GLUCOSE SERPL-MCNC: 118 MG/DL (ref 74–99)
HCT VFR BLD AUTO: 47.4 % (ref 39–53)
HGB BLD-MCNC: 15.4 GM/DL (ref 13–17.5)
LYMPHOCYTES # SPEC AUTO: 1.2 K/UL (ref 1–4.8)
LYMPHOCYTES NFR SPEC AUTO: 23 %
MCH RBC QN AUTO: 31.1 PG (ref 25–35)
MCHC RBC AUTO-ENTMCNC: 32.6 G/DL (ref 31–37)
MCV RBC AUTO: 95.3 FL (ref 80–100)
MONOCYTES # BLD AUTO: 0.3 K/UL (ref 0–1)
MONOCYTES NFR BLD AUTO: 7 %
NEUTROPHILS # BLD AUTO: 3.4 K/UL (ref 1.3–7.7)
NEUTROPHILS NFR BLD AUTO: 66 %
PLATELET # BLD AUTO: 76 K/UL (ref 150–450)
POTASSIUM SERPL-SCNC: 3.6 MMOL/L (ref 3.5–5.1)
PROT SERPL-MCNC: 6.4 G/DL (ref 6.3–8.2)
SODIUM SERPL-SCNC: 141 MMOL/L (ref 137–145)
WBC # BLD AUTO: 5.1 K/UL (ref 3.8–10.6)

## 2021-07-09 RX ADMIN — NALTREXONE HYDROCHLORIDE SCH MG: 50 TABLET, FILM COATED ORAL at 09:24

## 2021-07-09 RX ADMIN — ENOXAPARIN SODIUM SCH: 40 INJECTION SUBCUTANEOUS at 09:46

## 2021-07-09 RX ADMIN — Medication SCH MG: at 18:38

## 2021-07-09 RX ADMIN — Medication SCH MG: at 09:24

## 2021-07-09 RX ADMIN — ACETAMINOPHEN PRN MG: 500 TABLET ORAL at 21:04

## 2021-07-09 RX ADMIN — ENOXAPARIN SODIUM SCH MG: 40 INJECTION SUBCUTANEOUS at 09:24

## 2021-07-09 RX ADMIN — PANTOPRAZOLE SODIUM SCH MG: 40 TABLET, DELAYED RELEASE ORAL at 09:24

## 2021-07-09 NOTE — P.PN
Subjective


Progress Note Date: 07/09/21








This is a 70-year-old male patient who presented to the ER after being found 

laying on his floor in the house.  Patient reports he had been drinking due to 

recent death of his second wife and was feeling depressed and suicidal.  Patient

reports that he had recently tried to stop drinking but became shaky so he 

started drinking so the shaking would stop.  Patient has a past medical history 

of CVA, GERD, hypertension, osteoarthritis who cholecystectomy, hernia repair, 

anxiety, depression and heavy alcohol abuse.  Patient has been started on 

alcohol withdrawal protocol.  Home medications resumed including blood pressure 

medication.  Patient is maintained and suicide precautions sitter is at bedside.

 Psychiatry service is consulted.  This time patient denies chest pain or 

shortness of breath.  Patient denies nausea vomiting or diarrhea.  Patient 

denies any urinary burning or frequency





On 07/08/2021 patient was seen and examined on the medical floor, he is alert 

and oriented x 3 in no distress, he denies any complaints there is no fever or 

chills no headache or dizziness no chest pain no shortness of breath no 

palpitation no cough no nausea or vomiting no abdominal pain no diarrhea no bloo

d in the stools no burning with urination no frequency or urgency and no 

hematuria, there is no weakness or numbness in any of the extremities no change 

in vision speech or gait.  He is calm and does not look agitated at this time, 

however he stated that he is feeling anxious and is requesting Ativan, otherwise

he denies any complaints.





On 07/09/2021.  Patient is alert and oriented 3.  pain is complaining of some 

tooth discomfort.  reports this has been an ongoing issue but has been unable to

see dentist in many years.  Patient denies chest pain or shortness breath.  

Patient denies nausea vomiting or diarrhea.  Patient denies any urinary burning 

or frequency.  Psychiatry services are following patient.  Medications have been

adjusted.  PT OT and social work services consulted for discharge planning 

patient may require ECF upon discharge





Objective





- Vital Signs


Vital signs: 


                                   Vital Signs











Temp  98.8 F   07/09/21 02:00


 


Pulse  61   07/09/21 02:00


 


Resp  18   07/09/21 02:00


 


BP  149/78   07/09/21 02:00


 


Pulse Ox  96   07/09/21 02:00








                                 Intake & Output











 07/08/21 07/09/21 07/09/21





 18:59 06:59 18:59


 


Intake Total 240  


 


Balance 240  


 


Intake:   


 


    


 


    0.9@20cc/hr. 240  


 


Other:   


 


  Voiding Method Urinal Urinal Urinal





 Diaper Diaper Diaper


 


  # Voids 4 3 


 


  # Bowel Movements  2 














- Exam





In general patient is alert and oriented x 3 in no distress


HEENT head normocephalic and atraumatic


Neck is supple no JVD no goiter no lymphadenopathy no carotid bruit


Chest examination is clear to auscultation no crackles no wheezing


Cardiac exam reveals regular heart sounds S1 and S2 no gallops no murmurs


Abdomen is soft nontender no organomegaly with normal bowel sounds


Extremity exam reveals no edema no cyanosis or clubbing


Neurological examination reveals no gross focal deficits





- Labs


CBC & Chem 7: 


                                 07/08/21 05:21





                                 07/08/21 05:21





Assessment and Plan


Assessment: 





1.  Alcohol dependence.  Patient started alcohol withdrawal protocol





2.  Suicidal ideation.  Patient maintained on suicide precautions psychiatry 

service is consulted.  Patient was evaluated by psychiatry services medications 

have been adjusted.





3.  Essential hypertension.  Home meds resumed





4.  History of CVA





5.  Alcohol dependence





6.  History of GERD








DVT prophylaxis Lovenox.  GI prophylaxis Protonix


Psychiatry service following


Alcohol withdrawal protocol


Repeat labs ordered


PT OT and social work services consulted for discharge planning

## 2021-07-09 NOTE — CONS
CONSULTATION



DATE OF SERVICE:

2021



PURPOSE FOR CONSULTATION:

Evaluate for depression with suicidal statements.  He had significant alcohol

dependence.



HISTORY OF PRESENTING ILLNESS:

The patient is a 70-year-old male. He was brought by EMS to the hospital because he was

found lying on the floor in his house.  He had been drinking.  He had made statements

that he was suicidal.  He has a number of complicating general health issues.  It is

noted that the patient stated that his wife   due to complications from

pulmonary embolism. He said he has been in considerable grief relating to that.  He

acknowledges that he has struggled with drinking. He says that he drinks about 4 beers

per day.  He may go up a few days or even a few weeks without drinking, though then he

slips back into drinking.  He says he very much wants to quit and gets very guilty when

he has been drinking, though he acknowledges that he has struggled greatly to try to

get himself on track to stay away from alcohol.  His blood alcohol level on admission

was 330.  Patient suggests that he has had long-term problems with drinking.  He says

that over the last few months he has been able to go for up to 2 weeks at a time

without drinking, though generally his sober periods will be shorter than that.  He

acknowledges that he has had long-term problems with depression as well.  He notes that

he struggled in both his marriages including with his wife who just recently ,

which was a second marriage. He currently is not on any psychotropic medication.  He

noted that he had a psychiatric hospitalization at this facility around  for

depression.  We said that he believed he stayed on the unit for about 3 days. He

currently has not been in the mental health care of [facility]. He says that currently he

just feels sad.  He struggles with a number of physical issues and says that wears on

him greatly.  He has a very dim outlook on life in general.  He notes that he sleeps

fair.  He has poor energy and motivation.  He does not indicate having the auditory

hallucinations or delusional thinking. Does not identify panic symptoms.  He said that

he is willing to look at treatment options both for drinking as well as depression. In

regard to suicide statements that he had made, he acknowledged and then stated that

though said mostly it was out of frustration and not that he has in the intent or plan

in that direction.  He stated that he does not have any history of self harmful

behavior other than his problematic drinking.  He said that he had made the statement

about suicide essentially as "how would you feel if you were in my shoes."



MENTAL STATUS EXAM:

Patient sat up in bed.  He gave good eye contact.  He answered questions appropriately.

His thoughts were clear, coherent.  He was spontaneous and interactive.  He was able to

stay on track in the conversation.  He does talk about a number of mental health issues

that he was aware of and also what things he has done through the years to inform

himself about the field of psychology.  He seemed to be fairly well informed. His

affect was a little constricted though he had a friendly manner and was quite animated

in how he presented himself.  His mood was dysphoric. He was moderately distressed.

There was no indication of thought disorder.  He denied thoughts or impulse towards

self-harm. On cognitive exam he was oriented x3 and alert.  He could recall 2/3 objects

in 4 minutes. He could give the days of the week in reverse order without difficulty.

He could describe recent events consistently with what was documented in the medical

record.



ASSESSMENT:

This 70-year-old male is diagnosed with major depression and alcohol dependence.  I had

an extensive discussion with the patient regarding treatment options.  At this point, I

will initiate Prozac 10 mg a day.  It is noted at home he has been on Rivea and it

would be reasonable to continue Rivea as an adjunct in helping to ameliorate early

alcohol withdrawal issues. I would look at some additional options to help in early

withdrawal.  His CIWA scores have been low. He did show elevated BP, which also has

come down to the normal range today.  A significant issue regarding discharge planning

is his leg weakness where he barely is able to stand using a walker and needs

assistance to get out of bed.  I will start the patient on Vistaril 50 mg twice a day

p.r.n. for anxiety, as the patient did request some help in that regard. Advised the

patient that at this point it would be best if we were able to move him away from

benzodiazepines for a number of reasons.  I will continue to follow.





MMEUGENEL / DEEDEEN: [id number] / Job#: [id number]

## 2021-07-10 LAB
ALBUMIN SERPL-MCNC: 3.3 G/DL (ref 3.5–5)
ALBUMIN/GLOB SERPL: 1.3 {RATIO}
ALP SERPL-CCNC: 60 U/L (ref 38–126)
ALT SERPL-CCNC: 18 U/L (ref 4–49)
ANION GAP SERPL CALC-SCNC: 5 MMOL/L
AST SERPL-CCNC: 31 U/L (ref 17–59)
BASOPHILS # BLD AUTO: 0.1 K/UL (ref 0–0.2)
BASOPHILS NFR BLD AUTO: 1 %
BUN SERPL-SCNC: 20 MG/DL (ref 9–20)
CALCIUM SPEC-MCNC: 8.8 MG/DL (ref 8.4–10.2)
CHLORIDE SERPL-SCNC: 111 MMOL/L (ref 98–107)
CO2 SERPL-SCNC: 25 MMOL/L (ref 22–30)
EOSINOPHIL # BLD AUTO: 0.3 K/UL (ref 0–0.7)
EOSINOPHIL NFR BLD AUTO: 4 %
ERYTHROCYTE [DISTWIDTH] IN BLOOD BY AUTOMATED COUNT: 4.97 M/UL (ref 4.3–5.9)
ERYTHROCYTE [DISTWIDTH] IN BLOOD: 14.6 % (ref 11.5–15.5)
GLOBULIN SER CALC-MCNC: 2.6 G/DL
GLUCOSE SERPL-MCNC: 131 MG/DL (ref 74–99)
HCT VFR BLD AUTO: 47.4 % (ref 39–53)
HGB BLD-MCNC: 15.9 GM/DL (ref 13–17.5)
LYMPHOCYTES # SPEC AUTO: 1.2 K/UL (ref 1–4.8)
LYMPHOCYTES NFR SPEC AUTO: 18 %
MCH RBC QN AUTO: 31.9 PG (ref 25–35)
MCHC RBC AUTO-ENTMCNC: 33.4 G/DL (ref 31–37)
MCV RBC AUTO: 95.4 FL (ref 80–100)
MONOCYTES # BLD AUTO: 0.5 K/UL (ref 0–1)
MONOCYTES NFR BLD AUTO: 7 %
NEUTROPHILS # BLD AUTO: 4.5 K/UL (ref 1.3–7.7)
NEUTROPHILS NFR BLD AUTO: 68 %
PLATELET # BLD AUTO: 90 K/UL (ref 150–450)
POTASSIUM SERPL-SCNC: 3.6 MMOL/L (ref 3.5–5.1)
PROT SERPL-MCNC: 5.9 G/DL (ref 6.3–8.2)
SODIUM SERPL-SCNC: 141 MMOL/L (ref 137–145)
WBC # BLD AUTO: 6.7 K/UL (ref 3.8–10.6)

## 2021-07-10 RX ADMIN — CLINDAMYCIN HYDROCHLORIDE SCH MG: 150 CAPSULE ORAL at 22:17

## 2021-07-10 RX ADMIN — NALTREXONE HYDROCHLORIDE SCH MG: 50 TABLET, FILM COATED ORAL at 07:24

## 2021-07-10 RX ADMIN — Medication SCH MG: at 17:06

## 2021-07-10 RX ADMIN — ENOXAPARIN SODIUM SCH MG: 40 INJECTION SUBCUTANEOUS at 07:22

## 2021-07-10 RX ADMIN — Medication SCH MG: at 07:23

## 2021-07-10 RX ADMIN — ACETAMINOPHEN PRN MG: 500 TABLET ORAL at 21:47

## 2021-07-10 RX ADMIN — PANTOPRAZOLE SODIUM SCH MG: 40 TABLET, DELAYED RELEASE ORAL at 07:23

## 2021-07-10 RX ADMIN — CLINDAMYCIN HYDROCHLORIDE SCH MG: 150 CAPSULE ORAL at 15:03

## 2021-07-10 NOTE — P.PN
Subjective


Progress Note Date: 07/10/21








This is a 70-year-old male patient who presented to the ER after being found 

laying on his floor in the house.  Patient reports he had been drinking due to 

recent death of his second wife and was feeling depressed and suicidal.  Patient

reports that he had recently tried to stop drinking but became shaky so he 

started drinking so the shaking would stop.  Patient has a past medical history 

of CVA, GERD, hypertension, osteoarthritis who cholecystectomy, hernia repair, 

anxiety, depression and heavy alcohol abuse.  Patient has been started on 

alcohol withdrawal protocol.  Home medications resumed including blood pressure 

medication.  Patient is maintained and suicide precautions sitter is at bedside.

 Psychiatry service is consulted.  This time patient denies chest pain or 

shortness of breath.  Patient denies nausea vomiting or diarrhea.  Patient 

denies any urinary burning or frequency





On 07/08/2021 patient was seen and examined on the medical floor, he is alert 

and oriented x 3 in no distress, he denies any complaints there is no fever or 

chills no headache or dizziness no chest pain no shortness of breath no 

palpitation no cough no nausea or vomiting no abdominal pain no diarrhea no bloo

d in the stools no burning with urination no frequency or urgency and no 

hematuria, there is no weakness or numbness in any of the extremities no change 

in vision speech or gait.  He is calm and does not look agitated at this time, 

however he stated that he is feeling anxious and is requesting Ativan, otherwise

he denies any complaints.





On 07/09/2021.  Patient is alert and oriented 3.  pain is complaining of some 

tooth discomfort.  reports this has been an ongoing issue but has been unable to

see dentist in many years.  Patient denies chest pain or shortness breath.  

Patient denies nausea vomiting or diarrhea.  Patient denies any urinary burning 

or frequency.  Psychiatry services are following patient.  Medications have been

adjusted.  PT OT and social work services consulted for discharge planning 

patient may require ECF upon discharge





On 07/10/2021 patient was seen and examined on the medical floor he is alert and

oriented 3 in no apparent distress, he is still complaining of was ache 

otherwise he denies any complaints there is no fever or chills no headache or 

dizziness no chest pain no shortness of breath no cough no nausea or vomiting no

abdominal pain no diarrhea and no urinary symptoms.  At this time will add 

Cleocin for possible tooth abscess patient was advised to see his dentist as 

soon as possible after discharge will also ask physical therapy and occupational

therapy, anticipate discharge on Monday, patient will need  held to

assess for possible admission to a rehab, either at [facility] or one of the 

local nursing home.





Objective





- Vital Signs


Vital signs: 


                                   Vital Signs











Temp  98.3 F   07/10/21 12:01


 


Pulse  61   07/10/21 12:01


 


Resp  16   07/10/21 12:01


 


BP  128/77   07/10/21 12:01


 


Pulse Ox  95   07/10/21 12:01








                                 Intake & Output











 07/09/21 07/10/21 07/10/21





 18:59 06:59 18:59


 


Intake Total 160  


 


Balance 160  


 


Intake:   


 


    


 


    0.9@20cc/hr. 160  


 


Other:   


 


  Voiding Method Urinal Urinal Diaper





 Diaper Diaper Incontinent


 


  # Voids  1 














- Exam





In general patient is alert and oriented x 3 in no distress


HEENT head normocephalic and atraumatic


Neck is supple no JVD no goiter no lymphadenopathy no carotid bruit


Chest examination is clear to auscultation no crackles no wheezing


Cardiac exam reveals regular heart sounds S1 and S2 no gallops no murmurs


Abdomen is soft nontender no organomegaly with normal bowel sounds


Extremity exam reveals no edema no cyanosis or clubbing


Neurological examination reveals no gross focal deficits





- Labs


CBC & Chem 7: 


                                 07/10/21 06:52





                                 07/10/21 06:52


Labs: 


                  Abnormal Lab Results - Last 24 Hours (Table)











  07/10/21 07/10/21 Range/Units





  06:52 06:52 


 


Plt Count  90 L   (150-450)  k/uL


 


Chloride   111 H  ()  mmol/L


 


Glucose   131 H  (74-99)  mg/dL


 


Total Protein   5.9 L  (6.3-8.2)  g/dL


 


Albumin   3.3 L  (3.5-5.0)  g/dL














Assessment and Plan


Assessment: 





1.  Alcohol dependence.  Patient started alcohol withdrawal protocol





2.  Suicidal ideation.  Patient maintained on suicide precautions psychiatry 

service is consulted.  Patient was evaluated by psychiatry services medications 

have been adjusted.





3.  Essential hypertension.  Home meds resumed





4.  History of CVA





5.  Alcohol dependence





6.  History of GERD








DVT prophylaxis Lovenox.  GI prophylaxis Protonix


Psychiatry service following


Alcohol withdrawal protocol


Repeat labs ordered


PT OT and social work services consulted for discharge planning

## 2021-07-10 NOTE — PN
PROGRESS NOTE



DATE OF SERVICE:

07/09/2021



PURPOSE FOR CONSULTATION:

Evaluate for depression with suicidal statements.  He has significant alcohol

dependence.



INTERVAL HISTORY:

The patient has been doing fair.  He continues with some various general health

complaints.  For the most part he seems to be managing adequately. He is quite weak and

has difficulty standing without assistant.  The patient was able to talk fairly openly

about the level of drinking he has had. He acknowledges that the drinking and

depression that he has experienced do intertwine. I talked to him about the possibility

of ESV referral. He was not opposed to that idea and accepts that especially his leg

weakness puts him at significant disability for making any effort to live

independently.  He has not had trouble with the start of Prozac, but does report

symptoms that may likely relate to alcohol withdrawal, including some anxiety,

restlessness, mood issues, as well as aches and pains.  He does not seem to have

significant complaints in that regard, though acknowledges that he has always had a

fair amount of discomfort any time that he has gone without alcohol.



When I talked to the patient today he gave good eye contact.  It was noted that he was

quite spontaneous and interactive.  He talked quite a bit about various issues in his

living situation plus experiences he has had in the past.  He talked further about some

issues of psychology that he had studied.  He seemed to be well informed in that

regard.  His thoughts were clear and coherent.  He was oriented and alert.



ASSESSMENT:

I will continue the current diagnosis of depression.  He also is likely having

significant alcohol withdrawal issues.  I will start the patient on Zyprexa 2.5 mg in

the morning, 2.5 mg in the afternoon and 5 mg at bedtime.  The aim of Zyprexa is to

help reduce physiologic stress response relating to acute alcohol withdrawal.  I

discussed with the patient that I would anticipate alcohol withdrawal issues possibly

getting worse over the next 2 weeks, which is the usual course for alcohol withdrawal.

It may be 4-6 weeks of a moderate degree of withdrawal symptoms before he sees some

clearing.  We discussed that his antidepressant may not have much benefit until beyond

4-6 weeks free of alcohol.  It would be reasonable for him to continue on Zyprexa over

the next several weeks.  It seems most appropriate that the patient would be referred

for ECF. I will not be in the hospital this week weekend or coming week. At this point,

I think it is reasonable for the patient to continue on his current psychotropic

medications.  If there are further issues that need to be addressed, please re-consult

Psychiatry.





MMFILEMON / IJN: [id number] / Job#: [id number]

## 2021-07-10 NOTE — P.CN
Psychiatric Consult





- .


Consult date: 07/10/21


Consult:: 





07/10/21 11:40


This 70-year-old white male patient was found laying on the floor.  He was 

drinking beer and stated that he loves beer.  He stated that he experienced the 

death of his second wife and he had a broken heart and people can die from 

broken heart.  Because of that he was feeling depressed and suicidal.  He stated

he does not have any suicidal thoughts anymore.  He stated he would like to go 

to [place] for a week to deal with his alcohol problem.  Patient stated he 

has already been seen by a psychiatrist and has been prescribed Prozac which 

makes his mouth dry.  He stated he knows that most of the psychiatric medic

ations can make mouth dry.  I reviewed his a medical record and they are as 

follows:











"This is a 70-year-old male patient who presented to the ER after being found 

laying on his floor in the house.  Patient reports he had been drinking due to 

recent death of his second wife and was feeling depressed and suicidal.  Patient

reports that he had recently tried to stop drinking but became shaky so he 

started drinking so the shaking would stop.  Patient has a past medical history 

of CVA, GERD, hypertension, osteoarthritis who cholecystectomy, hernia repair, 

anxiety, depression and heavy alcohol abuse.  Patient has been started on 

alcohol withdrawal protocol.  Home medications resumed including blood pressure 

medication.  Patient is maintained and suicide precautions sitter is at bedside.

 Psychiatry service is consulted.  This time patient denies chest pain or 

shortness of breath.  Patient denies nausea vomiting or diarrhea.  Patient 

denies any urinary burning or frequency





On 07/08/2021 patient was seen and examined on the medical floor, he is alert 

and oriented x 3 in no distress, he denies any complaints there is no fever or 

chills no headache or dizziness no chest pain no shortness of breath no 

palpitation no cough no nausea or vomiting no abdominal pain no diarrhea no 

blood in the stools no burning with urination no frequency or urgency and no 

hematuria, there is no weakness or numbness in any of the extremities no change 

in vision speech or gait.  He is calm and does not look agitated at this time, 

however he stated that he is feeling anxious and is requesting Ativan, otherwise

he denies any complaints.





On 07/09/2021.  Patient is alert and oriented 3.  pain is complaining of some 

tooth discomfort.  reports this has been an ongoing issue but has been unable to

see dentist in many years.  Patient denies chest pain or shortness breath.  

Patient denies nausea vomiting or diarrhea.  Patient denies any urinary burning 

or frequency.  Psychiatry services are following patient.  Medications have been

adjusted.  PT OT and social work services consulted for discharge planning 

patient may require ECF upon discharge".





At this time patient denies any thoughts of hurting himself or hurting someone 

else.  He is alert and oriented to time place and person.  He denies abuse of 

any other drugs.  He appears to be of his stated age.  He has adequate speech 

language and communication skills.  Mood and affect is slightly depressed.  His 

behavior is cooperative.  He does not have any auditory visual or any other 

types of hallucinations.  He does not have any delusions.  He does have insight 

into his problems and his judgment is intact.





Diagnostic impression:


Alcohol abuse


Adjustment disorder with mixed emotions





Recommendations: At this time this patient does not meet the criteria for 

psychiatric hospitalization.  He does have a difficulty caring for himself.  I 

agreed with him going to [facility] for alcohol rehab.  I also agree with the 

his primary care physician's impression that he may benefit from some sort of 

extended care facility where he could be taken care off.


07/10/21 11:45

## 2021-07-11 LAB
ALBUMIN SERPL-MCNC: 3.5 G/DL (ref 3.5–5)
ALBUMIN/GLOB SERPL: 1.3 {RATIO}
ALP SERPL-CCNC: 69 U/L (ref 38–126)
ALT SERPL-CCNC: 23 U/L (ref 4–49)
ANION GAP SERPL CALC-SCNC: 7 MMOL/L
AST SERPL-CCNC: 39 U/L (ref 17–59)
BASOPHILS # BLD AUTO: 0 K/UL (ref 0–0.2)
BASOPHILS NFR BLD AUTO: 1 %
BUN SERPL-SCNC: 21 MG/DL (ref 9–20)
CALCIUM SPEC-MCNC: 8.7 MG/DL (ref 8.4–10.2)
CHLORIDE SERPL-SCNC: 108 MMOL/L (ref 98–107)
CO2 SERPL-SCNC: 28 MMOL/L (ref 22–30)
EOSINOPHIL # BLD AUTO: 0.2 K/UL (ref 0–0.7)
EOSINOPHIL NFR BLD AUTO: 4 %
ERYTHROCYTE [DISTWIDTH] IN BLOOD BY AUTOMATED COUNT: 4.82 M/UL (ref 4.3–5.9)
ERYTHROCYTE [DISTWIDTH] IN BLOOD: 14.7 % (ref 11.5–15.5)
GLOBULIN SER CALC-MCNC: 2.7 G/DL
GLUCOSE SERPL-MCNC: 99 MG/DL (ref 74–99)
HCT VFR BLD AUTO: 46.4 % (ref 39–53)
HGB BLD-MCNC: 15.5 GM/DL (ref 13–17.5)
LYMPHOCYTES # SPEC AUTO: 1.5 K/UL (ref 1–4.8)
LYMPHOCYTES NFR SPEC AUTO: 29 %
MCH RBC QN AUTO: 32.1 PG (ref 25–35)
MCHC RBC AUTO-ENTMCNC: 33.4 G/DL (ref 31–37)
MCV RBC AUTO: 96.2 FL (ref 80–100)
MONOCYTES # BLD AUTO: 0.4 K/UL (ref 0–1)
MONOCYTES NFR BLD AUTO: 8 %
NEUTROPHILS # BLD AUTO: 3 K/UL (ref 1.3–7.7)
NEUTROPHILS NFR BLD AUTO: 57 %
PLATELET # BLD AUTO: 109 K/UL (ref 150–450)
POTASSIUM SERPL-SCNC: 3.4 MMOL/L (ref 3.5–5.1)
PROT SERPL-MCNC: 6.2 G/DL (ref 6.3–8.2)
SODIUM SERPL-SCNC: 143 MMOL/L (ref 137–145)
WBC # BLD AUTO: 5.2 K/UL (ref 3.8–10.6)

## 2021-07-11 RX ADMIN — PANTOPRAZOLE SODIUM SCH MG: 40 TABLET, DELAYED RELEASE ORAL at 09:16

## 2021-07-11 RX ADMIN — CLINDAMYCIN HYDROCHLORIDE SCH MG: 150 CAPSULE ORAL at 09:17

## 2021-07-11 RX ADMIN — CLINDAMYCIN HYDROCHLORIDE SCH MG: 150 CAPSULE ORAL at 16:47

## 2021-07-11 RX ADMIN — POTASSIUM CHLORIDE SCH MEQ: 20 TABLET, EXTENDED RELEASE ORAL at 12:49

## 2021-07-11 RX ADMIN — NALTREXONE HYDROCHLORIDE SCH MG: 50 TABLET, FILM COATED ORAL at 09:17

## 2021-07-11 RX ADMIN — POTASSIUM CHLORIDE SCH MEQ: 20 TABLET, EXTENDED RELEASE ORAL at 11:06

## 2021-07-11 RX ADMIN — Medication SCH MG: at 16:47

## 2021-07-11 RX ADMIN — CLINDAMYCIN HYDROCHLORIDE SCH MG: 150 CAPSULE ORAL at 22:04

## 2021-07-11 RX ADMIN — Medication SCH MG: at 09:16

## 2021-07-11 RX ADMIN — ENOXAPARIN SODIUM SCH MG: 40 INJECTION SUBCUTANEOUS at 09:16

## 2021-07-11 NOTE — P.PN
Subjective


Progress Note Date: 07/11/21








This is a 70-year-old male patient who presented to the ER after being found 

laying on his floor in the house.  Patient reports he had been drinking due to 

recent death of his second wife and was feeling depressed and suicidal.  Patient

reports that he had recently tried to stop drinking but became shaky so he 

started drinking so the shaking would stop.  Patient has a past medical history 

of CVA, GERD, hypertension, osteoarthritis who cholecystectomy, hernia repair, 

anxiety, depression and heavy alcohol abuse.  Patient has been started on 

alcohol withdrawal protocol.  Home medications resumed including blood pressure 

medication.  Patient is maintained and suicide precautions sitter is at bedside.

 Psychiatry service is consulted.  This time patient denies chest pain or 

shortness of breath.  Patient denies nausea vomiting or diarrhea.  Patient 

denies any urinary burning or frequency





On 07/08/2021 patient was seen and examined on the medical floor, he is alert 

and oriented x 3 in no distress, he denies any complaints there is no fever or 

chills no headache or dizziness no chest pain no shortness of breath no 

palpitation no cough no nausea or vomiting no abdominal pain no diarrhea no bloo

d in the stools no burning with urination no frequency or urgency and no 

hematuria, there is no weakness or numbness in any of the extremities no change 

in vision speech or gait.  He is calm and does not look agitated at this time, 

however he stated that he is feeling anxious and is requesting Ativan, otherwise

he denies any complaints.





On 07/09/2021.  Patient is alert and oriented 3.  pain is complaining of some 

tooth discomfort.  reports this has been an ongoing issue but has been unable to

see dentist in many years.  Patient denies chest pain or shortness breath.  

Patient denies nausea vomiting or diarrhea.  Patient denies any urinary burning 

or frequency.  Psychiatry services are following patient.  Medications have been

adjusted.  PT OT and social work services consulted for discharge planning 

patient may require ECF upon discharge





On 07/10/2021 patient was seen and examined on the medical floor he is alert and

oriented 3 in no apparent distress, he is still complaining of was ache 

otherwise he denies any complaints there is no fever or chills no headache or 

dizziness no chest pain no shortness of breath no cough no nausea or vomiting no

abdominal pain no diarrhea and no urinary symptoms.  At this time will add 

Cleocin for possible tooth abscess patient was advised to see his dentist as 

soon as possible after discharge will also ask physical therapy and occupational

therapy, anticipate discharge on Monday, patient will need  held to

assess for possible admission to a rehab, either at [facility] or one of the 

local nursing home.





On 07/11/2021 patient is alert and oriented 3.  Patient maintained on Cleocin 

for tooth abscess.  Planning discharge tomorrow social work services are 

following patient to be DC'd home versus rehab.  At this time patient denies 

chest pain or shortness breath.  Patient denies nausea vomiting or diarrhea.  

Patient denies any urinary burning or frequency





Objective





- Vital Signs


Vital signs: 


                                   Vital Signs











Temp  97.9 F   07/11/21 05:00


 


Pulse  60   07/11/21 05:00


 


Resp  16   07/11/21 05:00


 


BP  155/75   07/11/21 05:00


 


Pulse Ox  95   07/11/21 05:00








                                 Intake & Output











 07/10/21 07/11/21 07/11/21





 18:59 06:59 18:59


 


Intake Total 1750 240 


 


Output Total  50 


 


Balance 1750 190 


 


Intake:   


 


   240 


 


    0.9@20cc/hr. 240 240 


 


  Oral 1510  


 


Output:   


 


  Urine  50 


 


Other:   


 


  Voiding Method Diaper Bedside Commode 





 Incontinent Urinal 


 


  # Voids 3 1 


 


  # Bowel Movements 1 1 














- Exam





In general patient is alert and oriented x 3 in no distress


HEENT head normocephalic and atraumatic


Neck is supple no JVD no goiter no lymphadenopathy no carotid bruit


Chest examination is clear to auscultation no crackles no wheezing


Cardiac exam reveals regular heart sounds S1 and S2 no gallops no murmurs


Abdomen is soft nontender no organomegaly with normal bowel sounds


Extremity exam reveals no edema no cyanosis or clubbing


Neurological examination reveals no gross focal deficits





- Labs


CBC & Chem 7: 


                                 07/11/21 04:52





                                 07/11/21 04:52


Labs: 


                  Abnormal Lab Results - Last 24 Hours (Table)











  07/11/21 07/11/21 Range/Units





  04:52 04:52 


 


Plt Count  109 L   (150-450)  k/uL


 


Potassium   3.4 L  (3.5-5.1)  mmol/L


 


Chloride   108 H  ()  mmol/L


 


BUN   21 H  (9-20)  mg/dL


 


Total Protein   6.2 L  (6.3-8.2)  g/dL














Assessment and Plan


Assessment: 





1.  Alcohol dependence.  Patient started alcohol withdrawal protocol





2.  Suicidal ideation.  Patient maintained on suicide precautions psychiatry 

service is consulted.  Patient was evaluated by psychiatry services medications 

have been adjusted.  Per psychiatry service is patient has not been cleared for 

psychiatric hospitalization





3.  Essential hypertension.  Home meds resumed





4.  History of CVA





5.  Alcohol dependence





6.  History of GERD





7.  Tooth abscess.  Patient started on Cleocin patient follow-up with dentist 

outpatient








DVT prophylaxis Lovenox.  GI prophylaxis Protonix


Psychiatry service following


Alcohol withdrawal protocol


Repeat labs ordered


PT OT and social work services consulted for discharge planning Patient Specific Counseling (Will Not Stick From Patient To Patient): Continue with Eucrisa as needed Patient Specific Counseling (Will Not Stick From Patient To Patient): ***Recommended to take a vitamin d3 2,000 to help with hair loss Detail Level: Zone Detail Level: Detailed

## 2021-07-12 RX ADMIN — ENOXAPARIN SODIUM SCH MG: 40 INJECTION SUBCUTANEOUS at 07:59

## 2021-07-12 RX ADMIN — Medication SCH MG: at 16:31

## 2021-07-12 RX ADMIN — CLINDAMYCIN HYDROCHLORIDE SCH MG: 150 CAPSULE ORAL at 20:59

## 2021-07-12 RX ADMIN — CLINDAMYCIN HYDROCHLORIDE SCH MG: 150 CAPSULE ORAL at 16:31

## 2021-07-12 RX ADMIN — NALTREXONE HYDROCHLORIDE SCH MG: 50 TABLET, FILM COATED ORAL at 08:00

## 2021-07-12 RX ADMIN — CLINDAMYCIN HYDROCHLORIDE SCH MG: 150 CAPSULE ORAL at 08:00

## 2021-07-12 RX ADMIN — Medication SCH MG: at 08:00

## 2021-07-12 RX ADMIN — PANTOPRAZOLE SODIUM SCH MG: 40 TABLET, DELAYED RELEASE ORAL at 08:00

## 2021-07-13 VITALS — HEART RATE: 86 BPM | DIASTOLIC BLOOD PRESSURE: 92 MMHG | SYSTOLIC BLOOD PRESSURE: 138 MMHG | TEMPERATURE: 98.3 F

## 2021-07-13 VITALS — RESPIRATION RATE: 16 BRPM

## 2021-07-13 RX ADMIN — CLINDAMYCIN HYDROCHLORIDE SCH MG: 150 CAPSULE ORAL at 07:39

## 2021-07-13 RX ADMIN — ENOXAPARIN SODIUM SCH: 40 INJECTION SUBCUTANEOUS at 07:56

## 2021-07-13 RX ADMIN — PANTOPRAZOLE SODIUM SCH MG: 40 TABLET, DELAYED RELEASE ORAL at 07:39

## 2021-07-13 RX ADMIN — NALTREXONE HYDROCHLORIDE SCH MG: 50 TABLET, FILM COATED ORAL at 07:39

## 2021-07-13 RX ADMIN — ENOXAPARIN SODIUM SCH MG: 40 INJECTION SUBCUTANEOUS at 07:40

## 2021-07-13 RX ADMIN — CLINDAMYCIN HYDROCHLORIDE SCH MG: 150 CAPSULE ORAL at 15:05

## 2021-07-13 RX ADMIN — Medication SCH MG: at 07:39

## 2021-07-13 NOTE — P.PN
Subjective


Progress Note Date: 07/12/21








This is a 70-year-old male patient who presented to the ER after being found 

laying on his floor in the house.  Patient reports he had been drinking due to 

recent death of his second wife and was feeling depressed and suicidal.  Patient

reports that he had recently tried to stop drinking but became shaky so he 

started drinking so the shaking would stop.  Patient has a past medical history 

of CVA, GERD, hypertension, osteoarthritis who cholecystectomy, hernia repair, 

anxiety, depression and heavy alcohol abuse.  Patient has been started on 

alcohol withdrawal protocol.  Home medications resumed including blood pressure 

medication.  Patient is maintained and suicide precautions sitter is at bedside.

 Psychiatry service is consulted.  This time patient denies chest pain or 

shortness of breath.  Patient denies nausea vomiting or diarrhea.  Patient 

denies any urinary burning or frequency





On 07/08/2021 patient was seen and examined on the medical floor, he is alert 

and oriented x 3 in no distress, he denies any complaints there is no fever or 

chills no headache or dizziness no chest pain no shortness of breath no 

palpitation no cough no nausea or vomiting no abdominal pain no diarrhea no bloo

d in the stools no burning with urination no frequency or urgency and no 

hematuria, there is no weakness or numbness in any of the extremities no change 

in vision speech or gait.  He is calm and does not look agitated at this time, 

however he stated that he is feeling anxious and is requesting Ativan, otherwise

he denies any complaints.





On 07/09/2021.  Patient is alert and oriented 3.  pain is complaining of some 

tooth discomfort.  reports this has been an ongoing issue but has been unable to

see dentist in many years.  Patient denies chest pain or shortness breath.  

Patient denies nausea vomiting or diarrhea.  Patient denies any urinary burning 

or frequency.  Psychiatry services are following patient.  Medications have been

adjusted.  PT OT and social work services consulted for discharge planning 

patient may require ECF upon discharge





On 07/10/2021 patient was seen and examined on the medical floor he is alert and

oriented 3 in no apparent distress, he is still complaining of was ache 

otherwise he denies any complaints there is no fever or chills no headache or 

dizziness no chest pain no shortness of breath no cough no nausea or vomiting no

abdominal pain no diarrhea and no urinary symptoms.  At this time will add 

Cleocin for possible tooth abscess patient was advised to see his dentist as 

soon as possible after discharge will also ask physical therapy and occupational

therapy, anticipate discharge on Monday, patient will need  held to

assess for possible admission to a rehab, either at [facility] or one of the 

local nursing home.





On 07/11/2021 patient is alert and oriented 3.  Patient maintained on Cleocin 

for tooth abscess.  Planning discharge tomorrow social work services are 

following patient to be DC'd home versus rehab.  At this time patient denies 

chest pain or shortness breath.  Patient denies nausea vomiting or diarrhea.  

Patient denies any urinary burning or frequency.





On 07/12/2021 Patient was seen and examined on the medical floor, he is alert 

and oriented x 3 in no distress, he denies any complaints there is no fever or 

chills no headache or dizziness no chest pain no shortness of breath no 

palpitation no cough no nausea or vomiting no abdominal pain no diarrhea no 

blood in the stools no burning with urination no frequency or urgency and no 

hematuria, there is no weakness or numbness in any of the extremities no change 

in vision speech or gait.





Objective





- Vital Signs


Vital signs: 


                                   Vital Signs











Temp  98.7 F   07/12/21 11:22


 


Pulse  55 L  07/12/21 11:22


 


Resp  16   07/12/21 11:22


 


BP  151/79   07/12/21 11:22


 


Pulse Ox  96   07/12/21 11:22








                                 Intake & Output











 07/11/21 07/12/21 07/12/21





 18:59 06:59 18:59


 


Other:   


 


  Voiding Method Bedside Commode Bedside Commode Bedside Commode





 Urinal Urinal Urinal





   Diaper


 


  # Voids 2 2 4


 


  # Bowel Movements 1 1 1














- Exam





In general patient is alert and oriented x 3 in no distress


HEENT head normocephalic and atraumatic


Neck is supple no JVD no goiter no lymphadenopathy no carotid bruit


Chest examination is clear to auscultation no crackles no wheezing


Cardiac exam reveals regular heart sounds S1 and S2 no gallops no murmurs


Abdomen is soft nontender no organomegaly with normal bowel sounds


Extremity exam reveals no edema no cyanosis or clubbing


Neurological examination reveals no gross focal deficits





- Labs


CBC & Chem 7: 


                                 07/11/21 04:52





                                 07/11/21 04:52





Assessment and Plan


Assessment: 





1.  Alcohol dependence.  Patient started alcohol withdrawal protocol





2.  Suicidal ideation.  Patient maintained on suicide precautions psychiatry 

service is consulted.  Patient was evaluated by psychiatry services medications 

have been adjusted.  Per psychiatry service is patient has not been cleared for 

psychiatric hospitalization





3.  Essential hypertension.  Home meds resumed





4.  History of CVA





5.  Alcohol dependence





6.  History of GERD





7.  Tooth abscess.  Patient started on Cleocin patient follow-up with dentist 

outpatient








DVT prophylaxis Lovenox.  GI prophylaxis Protonix


Psychiatry service following


Alcohol withdrawal protocol


Repeat labs ordered


PT OT and social work services consulted for discharge planning

## 2021-07-17 NOTE — P.DS
Providers


Date of admission: 


07/07/21 11:08





Expected date of discharge: 07/13/21


Attending physician: 


Rajinder Foreman





Consults: 





                                        





07/06/21 20:10


Consult Physician Routine 


   Consulting Provider: Hosea Garcia


   Consult Reason/Comments: Depression, suicidal ideation, alcohol intoxication


   Do you want consulting provider notified?: Yes, Notify in am











Primary care physician: 


Rajinder Foreman





[facility] Course: 








Diagnosis on discharge:








1.  Alcohol dependence.  Patient started alcohol withdrawal protocol





2.  Suicidal ideation.  Patient maintained on suicide precautions psychiatry 

service is consulted.  Patient was evaluated by psychiatry services medications 

have been adjusted.  Per psychiatry service is patient has not been cleared for 

psychiatric hospitalization





3.  Essential hypertension.  Home meds resumed





4.  History of CVA





5.  Alcohol dependence





6.  History of GERD





7.  Tooth abscess.  Patient started on Cleocin patient follow-up with dentist 

outpatient











Hospital course:











This is a 70-year-old male patient who presented to the ER after being found 

laying on his floor in the house.  Patient reports he had been drinking due to 

recent death of his second wife and was feeling depressed and suicidal.  Patient

reports that he had recently tried to stop drinking but became shaky so he 

started drinking so the shaking would stop.  Patient has a past medical history 

of CVA, GERD, hypertension, osteoarthritis who cholecystectomy, hernia repair, 

anxiety, depression and heavy alcohol abuse.  Patient has been started on 

alcohol withdrawal protocol.  Home medications resumed including blood pressure 

medication.  Patient is maintained and suicide precautions sitter is at bedside.

 Psychiatry service is consulted.  This time patient denies chest pain or 

shortness of breath.  Patient denies nausea vomiting or diarrhea.  Patient 

denies any urinary burning or frequency





On 07/08/2021 patient was seen and examined on the medical floor, he is alert 

and oriented x 3 in no distress, he denies any complaints there is no fever or 

chills no headache or dizziness no chest pain no shortness of breath no 

palpitation no cough no nausea or vomiting no abdominal pain no diarrhea no 

blood in the stools no burning with urination no frequency or urgency and no 

hematuria, there is no weakness or numbness in any of the extremities no change 

in vision speech or gait.  He is calm and does not look agitated at this time, 

however he stated that he is feeling anxious and is requesting Ativan, otherwise

he denies any complaints.





On 07/09/2021.  Patient is alert and oriented 3.  pain is complaining of some 

tooth discomfort.  reports this has been an ongoing issue but has been unable to

see dentist in many years.  Patient denies chest pain or shortness breath.  

Patient denies nausea vomiting or diarrhea.  Patient denies any urinary burning 

or frequency.  Psychiatry services are following patient.  Medications have been

adjusted.  PT OT and social work services consulted for discharge planning 

patient may require ECF upon discharge





On 07/10/2021 patient was seen and examined on the medical floor he is alert and

oriented 3 in no apparent distress, he is still complaining of was ache 

otherwise he denies any complaints there is no fever or chills no headache or 

dizziness no chest pain no shortness of breath no cough no nausea or vomiting no

abdominal pain no diarrhea and no urinary symptoms.  At this time will add 

Cleocin for possible tooth abscess patient was advised to see his dentist as 

soon as possible after discharge will also ask physical therapy and occupational

therapy, anticipate discharge on Monday, patient will need  held to

assess for possible admission to a rehab, either at [place] or one of the 

local nursing home.





On 07/11/2021 patient is alert and oriented 3.  Patient maintained on Cleocin 

for tooth abscess.  Planning discharge tomorrow social work services are 

following patient to be DC'd home versus rehab.  At this time patient denies 

chest pain or shortness breath.  Patient denies nausea vomiting or diarrhea.  

Patient denies any urinary burning or frequency.





On 07/12/2021 Patient was seen and examined on the medical floor, he is alert 

and oriented x 3 in no distress, he denies any complaints there is no fever or 

chills no headache or dizziness no chest pain no shortness of breath no 

palpitation no cough no nausea or vomiting no abdominal pain no diarrhea no 

blood in the stools no burning with urination no frequency or urgency and no 

hematuria, there is no weakness or numbness in any of the extremities no change 

in vision speech or gait.





On 07/13/2021 patient is alert and oriented 3.  Patient will be DC'd home with 

home healthcare services.  Patient will be DC'd on Cleocin for tooth abscess.  

Patient advised the importance of medication compliance and follow up with PCP 

for further management of chronic conditions.  Patient also educated on the i

mportance of complete alcohol cessation.  At this time patient denies chest pain

or shortness breath.  Patient denies nausea vomiting or diarrhea.  Patient 

denies any urinary burning or frequency


Patient Condition at Discharge: Fair





Plan - Discharge Summary


Discharge Rx Participant: No


New Discharge Prescriptions: 


New


   Clindamycin [Cleocin] 300 mg PO TID  cap


   FLUoxetine HCL [PROzac] 10 mg PO DAILY  cap


   hydrOXYzine pamoate [Vistaril] 50 mg PO BID PRN  cap


     PRN Reason: Anxiety


   OLANZapine [ZyPREXA] 2.5 mg PO BID@0900,1500  tab


   OLANZapine [ZyPREXA] 5 mg PO HS  tab





Continue


   amLODIPine [Norvasc] 5 mg PO DAILY  tab


   atenoloL [Tenormin] 25 mg PO BID  tab


   Naltrexone HCl [Revia] 50 mg PO DAILY  tab


   Thiamine [Vitamin B-1] 100 mg PO BID-W/MEALS  tab


   lisinopriL [Zestril] 20 mg PO BID  tab


   Pantoprazole [Protonix] 40 mg PO AC-BRKFST  tablet.


Discharge Medication List





Naltrexone HCl [Revia] 50 mg PO DAILY  tab 06/02/21 [Rx]


Thiamine [Vitamin B-1] 100 mg PO BID-W/MEALS  tab 06/02/21 [Rx]


amLODIPine [Norvasc] 5 mg PO DAILY  tab 06/02/21 [Rx]


atenoloL [Tenormin] 25 mg PO BID  tab 06/02/21 [Rx]


lisinopriL [Zestril] 20 mg PO BID  tab 06/02/21 [Rx]


Pantoprazole [Protonix] 40 mg PO AC-BRKFST  tablet. 06/03/21 [Rx]


Clindamycin [Cleocin] 300 mg PO TID  cap 07/13/21 [Rx]


FLUoxetine HCL [PROzac] 10 mg PO DAILY  cap 07/13/21 [Rx]


OLANZapine [ZyPREXA] 2.5 mg PO BID@0900,1500  tab 07/13/21 [Rx]


OLANZapine [ZyPREXA] 5 mg PO HS  tab 07/13/21 [Rx]


hydrOXYzine pamoate [Vistaril] 50 mg PO BID PRN  cap 07/13/21 [Rx]








Follow up Appointment(s)/Referral(s): 


[facility], [NON-STAFF] - 1-2 Days (Agency will contact you.)


Rajinder Foreman MD [Primary Care Provider] - 07/16/21 9:45 am


Patient Instructions/Handouts:  Dental Abscess (GEN), Abuse of Alcohol (DC), 

Alcohol Withdrawal (DC), Help Prevent Suicide in Older Adults (DC), Suicide 

Prevention (DC)


Discharge Disposition: HOME SELF-CARE

## 2021-10-17 ENCOUNTER — HOSPITAL ENCOUNTER (INPATIENT)
Dept: HOSPITAL 47 - EC | Age: 71
LOS: 5 days | Discharge: HOME | DRG: 896 | End: 2021-10-22
Attending: INTERNAL MEDICINE | Admitting: INTERNAL MEDICINE
Payer: MEDICARE

## 2021-10-17 VITALS — BODY MASS INDEX: 29.2 KG/M2

## 2021-10-17 DIAGNOSIS — Z90.49: ICD-10-CM

## 2021-10-17 DIAGNOSIS — L03.115: ICD-10-CM

## 2021-10-17 DIAGNOSIS — K86.0: ICD-10-CM

## 2021-10-17 DIAGNOSIS — R53.1: ICD-10-CM

## 2021-10-17 DIAGNOSIS — E53.1: ICD-10-CM

## 2021-10-17 DIAGNOSIS — K40.90: ICD-10-CM

## 2021-10-17 DIAGNOSIS — I16.0: ICD-10-CM

## 2021-10-17 DIAGNOSIS — I69.393: ICD-10-CM

## 2021-10-17 DIAGNOSIS — J98.6: ICD-10-CM

## 2021-10-17 DIAGNOSIS — I16.1: ICD-10-CM

## 2021-10-17 DIAGNOSIS — I49.3: ICD-10-CM

## 2021-10-17 DIAGNOSIS — F41.9: ICD-10-CM

## 2021-10-17 DIAGNOSIS — G62.9: ICD-10-CM

## 2021-10-17 DIAGNOSIS — F32.9: ICD-10-CM

## 2021-10-17 DIAGNOSIS — F10.139: Primary | ICD-10-CM

## 2021-10-17 DIAGNOSIS — R29.6: ICD-10-CM

## 2021-10-17 DIAGNOSIS — I10: ICD-10-CM
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
## 2021-10-17 DIAGNOSIS — J98.11: ICD-10-CM

## 2021-10-17 DIAGNOSIS — Z91.81: ICD-10-CM

## 2021-10-17 DIAGNOSIS — I07.1: ICD-10-CM

## 2021-10-17 DIAGNOSIS — M10.9: ICD-10-CM

## 2021-10-17 DIAGNOSIS — K31.89: ICD-10-CM

## 2021-10-17 DIAGNOSIS — R32: ICD-10-CM

## 2021-10-17 DIAGNOSIS — R77.8: ICD-10-CM

## 2021-10-17 DIAGNOSIS — Z87.828: ICD-10-CM

## 2021-10-17 DIAGNOSIS — M19.90: ICD-10-CM

## 2021-10-17 DIAGNOSIS — F10.129: ICD-10-CM

## 2021-10-17 DIAGNOSIS — E63.8: ICD-10-CM

## 2021-10-17 DIAGNOSIS — J18.9: ICD-10-CM

## 2021-10-17 DIAGNOSIS — E63.9: ICD-10-CM

## 2021-10-17 DIAGNOSIS — Z91.14: ICD-10-CM

## 2021-10-17 DIAGNOSIS — I27.20: ICD-10-CM

## 2021-10-17 DIAGNOSIS — R19.7: ICD-10-CM

## 2021-10-17 DIAGNOSIS — R20.0: ICD-10-CM

## 2021-10-17 DIAGNOSIS — Z79.899: ICD-10-CM

## 2021-10-17 DIAGNOSIS — E53.8: ICD-10-CM

## 2021-10-17 DIAGNOSIS — Z87.19: ICD-10-CM

## 2021-10-17 LAB
ALBUMIN SERPL-MCNC: 4 G/DL (ref 3.5–5)
ALP SERPL-CCNC: 93 U/L (ref 38–126)
ALT SERPL-CCNC: 43 U/L (ref 4–49)
ANION GAP SERPL CALC-SCNC: 13 MMOL/L
AST SERPL-CCNC: 78 U/L (ref 17–59)
BASOPHILS # BLD AUTO: 0.1 K/UL (ref 0–0.2)
BASOPHILS NFR BLD AUTO: 1 %
BUN SERPL-SCNC: 13 MG/DL (ref 9–20)
CALCIUM SPEC-MCNC: 8.8 MG/DL (ref 8.4–10.2)
CHLORIDE SERPL-SCNC: 102 MMOL/L (ref 98–107)
CO2 SERPL-SCNC: 17 MMOL/L (ref 22–30)
EOSINOPHIL # BLD AUTO: 0.1 K/UL (ref 0–0.7)
EOSINOPHIL NFR BLD AUTO: 1 %
ERYTHROCYTE [DISTWIDTH] IN BLOOD BY AUTOMATED COUNT: 5.43 M/UL (ref 4.3–5.9)
ERYTHROCYTE [DISTWIDTH] IN BLOOD: 14.3 % (ref 11.5–15.5)
GLUCOSE SERPL-MCNC: 98 MG/DL (ref 74–99)
HCT VFR BLD AUTO: 49.6 % (ref 39–53)
HGB BLD-MCNC: 16.6 GM/DL (ref 13–17.5)
LYMPHOCYTES # SPEC AUTO: 1 K/UL (ref 1–4.8)
LYMPHOCYTES NFR SPEC AUTO: 12 %
MCH RBC QN AUTO: 30.6 PG (ref 25–35)
MCHC RBC AUTO-ENTMCNC: 33.5 G/DL (ref 31–37)
MCV RBC AUTO: 91.4 FL (ref 80–100)
MONOCYTES # BLD AUTO: 0.5 K/UL (ref 0–1)
MONOCYTES NFR BLD AUTO: 7 %
NEUTROPHILS # BLD AUTO: 6 K/UL (ref 1.3–7.7)
NEUTROPHILS NFR BLD AUTO: 77 %
PLATELET # BLD AUTO: 126 K/UL (ref 150–450)
POTASSIUM SERPL-SCNC: 4.3 MMOL/L (ref 3.5–5.1)
PROT SERPL-MCNC: 6.8 G/DL (ref 6.3–8.2)
SODIUM SERPL-SCNC: 132 MMOL/L (ref 137–145)
WBC # BLD AUTO: 7.8 K/UL (ref 3.8–10.6)

## 2021-10-17 PROCEDURE — 82607 VITAMIN B-12: CPT

## 2021-10-17 PROCEDURE — 83036 HEMOGLOBIN GLYCOSYLATED A1C: CPT

## 2021-10-17 PROCEDURE — 71045 X-RAY EXAM CHEST 1 VIEW: CPT

## 2021-10-17 PROCEDURE — 87635 SARS-COV-2 COVID-19 AMP PRB: CPT

## 2021-10-17 PROCEDURE — 82140 ASSAY OF AMMONIA: CPT

## 2021-10-17 PROCEDURE — 80053 COMPREHEN METABOLIC PANEL: CPT

## 2021-10-17 PROCEDURE — 93970 EXTREMITY STUDY: CPT

## 2021-10-17 PROCEDURE — 36415 COLL VENOUS BLD VENIPUNCTURE: CPT

## 2021-10-17 PROCEDURE — 84207 ASSAY OF VITAMIN B-6: CPT

## 2021-10-17 PROCEDURE — 84443 ASSAY THYROID STIM HORMONE: CPT

## 2021-10-17 PROCEDURE — 81003 URINALYSIS AUTO W/O SCOPE: CPT

## 2021-10-17 PROCEDURE — 82746 ASSAY OF FOLIC ACID SERUM: CPT

## 2021-10-17 PROCEDURE — 99285 EMERGENCY DEPT VISIT HI MDM: CPT

## 2021-10-17 PROCEDURE — 85025 COMPLETE CBC W/AUTO DIFF WBC: CPT

## 2021-10-17 PROCEDURE — 96376 TX/PRO/DX INJ SAME DRUG ADON: CPT

## 2021-10-17 PROCEDURE — 96374 THER/PROPH/DIAG INJ IV PUSH: CPT

## 2021-10-17 PROCEDURE — 93005 ELECTROCARDIOGRAM TRACING: CPT

## 2021-10-17 PROCEDURE — 83921 ORGANIC ACID SINGLE QUANT: CPT

## 2021-10-17 PROCEDURE — 80320 DRUG SCREEN QUANTALCOHOLS: CPT

## 2021-10-17 PROCEDURE — 93306 TTE W/DOPPLER COMPLETE: CPT

## 2021-10-17 PROCEDURE — 82150 ASSAY OF AMYLASE: CPT

## 2021-10-17 PROCEDURE — 83690 ASSAY OF LIPASE: CPT

## 2021-10-17 PROCEDURE — 83605 ASSAY OF LACTIC ACID: CPT

## 2021-10-17 PROCEDURE — 84484 ASSAY OF TROPONIN QUANT: CPT

## 2021-10-17 PROCEDURE — 76770 US EXAM ABDO BACK WALL COMP: CPT

## 2021-10-17 PROCEDURE — 96375 TX/PRO/DX INJ NEW DRUG ADDON: CPT

## 2021-10-17 NOTE — XR
EXAMINATION TYPE: XR chest 1V portable

 

DATE OF EXAM: 10/17/2021

 

COMPARISON: 9/17/2018

 

HISTORY: Fall. Pain.

 

TECHNIQUE:

 

FINDINGS: Heart is enlarged. There is elevation of the right diaphragm. There is no heart failure. Osiel
ny thorax is intact. Lungs are clear of consolidation.

 

IMPRESSION: Mild elevation of the right diaphragm consistent with some atelectasis. Mild cardiomegaly
. Diaphragm elevation increased compared to old exam.
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
## 2021-10-18 LAB
AMYLASE SERPL-CCNC: 54 U/L (ref 30–110)
LIPASE SERPL-CCNC: 194 U/L (ref 23–300)
PH UR: 5 [PH] (ref 5–8)
SP GR UR: 1.01 (ref 1–1.03)
UROBILINOGEN UR QL STRIP: <2 MG/DL (ref ?–2)

## 2021-10-18 RX ADMIN — FAMOTIDINE SCH MG: 20 TABLET, FILM COATED ORAL at 09:55

## 2021-10-18 RX ADMIN — CEFAZOLIN SCH MLS/HR: 330 INJECTION, POWDER, FOR SOLUTION INTRAMUSCULAR; INTRAVENOUS at 22:01

## 2021-10-18 RX ADMIN — FAMOTIDINE SCH MG: 20 TABLET, FILM COATED ORAL at 21:46

## 2021-10-18 RX ADMIN — CEFAZOLIN SCH MLS/HR: 330 INJECTION, POWDER, FOR SOLUTION INTRAMUSCULAR; INTRAVENOUS at 14:16

## 2021-10-18 RX ADMIN — Medication SCH MG: at 17:56

## 2021-10-18 RX ADMIN — CHLORDIAZEPOXIDE HYDROCHLORIDE AND CLIDINIUM BROMIDE SCH EACH: 5; 2.5 CAPSULE ORAL at 17:56

## 2021-10-18 RX ADMIN — CEFAZOLIN SCH MLS/HR: 330 INJECTION, POWDER, FOR SOLUTION INTRAMUSCULAR; INTRAVENOUS at 06:49

## 2021-10-18 RX ADMIN — CHLORDIAZEPOXIDE HYDROCHLORIDE AND CLIDINIUM BROMIDE SCH EACH: 5; 2.5 CAPSULE ORAL at 12:37
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
## 2021-10-18 NOTE — US
EXAMINATION TYPE: US venous doppler duplex LE 

 

DATE OF EXAM: 10/18/2021 11:09 AM

 

COMPARISON: NONE

 

CLINICAL HISTORY: leg swelling.

 

SIDE PERFORMED: Bilateral  

 

TECHNIQUE:  The lower extremity deep venous system is examined utilizing real time linear array sonog
blas with graded compression, doppler sonography and color-flow sonography.

 

VESSELS IMAGED:

Common Femoral Vein

Deep Femoral Vein

Greater Saphenous Vein *

Femoral Vein

Popliteal Vein

Small Saphenous Vein *

Proximal Calf Veins

(* superficial vessels)

 

 

 

Right Leg:  Negative for DVT

 

Left Leg:  Negative for DVT

 

 

 

IMPRESSION: No sonographic evidence for deep vein thrombosis of the bilateral lower extremities.

## 2021-10-18 NOTE — US
EXAMINATION TYPE: US renals and bladder

 

DATE OF EXAM: 10/18/2021

 

COMPARISON: NONE

 

CLINICAL HISTORY: urine incontinance.

 

EXAM MEASUREMENTS:

 

Right Kidney:  9.5x4.6x5.2 cm

Left Kidney: 11.1x6.1x5.9 cm

 

 

 

 

Right Kidney: prominent renal pyramids. 

Left Kidney: Prominent renal pyramids

? appearance of sponge kidney  

Bladder: wnl

**Bilateral Jets seen: No

 

 

 

 

 

IMPRESSION:

Bilateral prominence of the renal pyramids. Please correlate for possible medullary sponge kidney.

## 2021-10-18 NOTE — ECHOF
Referral Reason:Repeat LV function



MEASUREMENTS

--------

HEIGHT: 175.3 cm

WEIGHT: 90.7 kg

BP: 168/87

IVSd:   1.7 cm     (0.6 - 1.1)

LVIDd:   4.5 cm     (3.9 - 5.3)

LVPWd:   1.9 cm     (0.6 - 1.1)

EDV(Teich):   90 ml

IVSs:   2.2 cm

LVIDs:   2.1 cm

LVPWs:   2.6 cm

%IVS Thck:   31 %

ESV(Teich):   15 ml

EF(Teich):   84 %

%FS:   52 %

SV(Teich):   75 ml

RVIDd:   3.2 cm     (< 3.3)

LALs A4C:   6.0 cm

LAAs A4C:   21.8 cm

LAESV A-L A4C:   67 ml

LAESV MOD A4C:   61 ml

LALs A2C:   3.3 cm

LAAs A2C:   11.3 cm

LAESV A-L A2C:   33 ml

LAESV MOD A2C:   30 ml

LAESV(A-L):   64 ml

LAESV Index (A-L):   31.01 ml/m

Ao Diam:   3.3 cm     (2.0 - 3.7)

AV Cusp:   2.1 cm     (1.5 - 2.6)

EPSS:   0.3 cm

MV E Alexis:   0.46 m/s

MV DecT:   229 ms

MV Dec [place]:   2.0 m/s

MV A Alexis:   0.88 m/s

MV E/A Ratio:   0.53 

MV PHT:   66 ms

LVOT Vmax:   0.94 m/s

LVOT maxPG:   3.54 mmHg

AV Vmax:   1.52 m/s

AV maxP.23 mmHg

TR Vmax:   2.78 m/s

TR maxP.88 mmHg

RAP:   5.00 mmHg

RVSP:   35.88 mmHg

MV EF SLOPE:   50.50 mm/s     (70 - 150)

MV EXCURSION:   17.87 mm     (> 18.000)







FINDINGS

--------

Sinus rhythm.

This was a technically adequate study.

The left ventricular size is normal.   There is severe concentric left ventricular hypertrophy.   Ove
rall left ventricular systolic function is normal with, an EF between 55 - 60 %.

The right ventricle is normal in size.

LA is midly dilated 29-33ml/m2.

The right atrium was not well visualized.

Interatrial and interventricular septum intact.

There is no evidence of aortic regurgitation.   There is no evidence of aortic stenosis.

Mild mitral regurgitation is present.

Mild tricuspid regurgitation present.   There is mild pulmonary hypertension.   The right ventricular
 systolic pressure, as measured by Doppler, is 35.88mmHg.

There is no pulmonic regurgitation present.

The aortic root size is normal.

IVC Not well visulized.

There is no pericardial effusion.



CONCLUSIONS

--------

1. The left ventricular size is normal.

2. There is severe concentric left ventricular hypertrophy.

3. Overall left ventricular systolic function is normal with, an EF between 55 - 60 %.

4. LA is midly dilated 29-33ml/m2.

5. Mild mitral regurgitation is present.

6. Mild tricuspid regurgitation present.

7. There is mild pulmonary hypertension.





SONOGRAPHER: Ivelisse Reyes RDCS
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
## 2021-10-19 LAB — VIT B12 SERPL-MCNC: 240 PG/ML (ref 200–944)

## 2021-10-19 PROCEDURE — HZ2ZZZZ DETOXIFICATION SERVICES FOR SUBSTANCE ABUSE TREATMENT: ICD-10-PCS

## 2021-10-19 RX ADMIN — CHLORDIAZEPOXIDE HYDROCHLORIDE AND CLIDINIUM BROMIDE SCH EACH: 5; 2.5 CAPSULE ORAL at 17:32

## 2021-10-19 RX ADMIN — CYANOCOBALAMIN SCH MCG: 1000 INJECTION, SOLUTION INTRAMUSCULAR; SUBCUTANEOUS at 17:32

## 2021-10-19 RX ADMIN — FAMOTIDINE SCH MG: 20 TABLET, FILM COATED ORAL at 10:06

## 2021-10-19 RX ADMIN — CHLORDIAZEPOXIDE HYDROCHLORIDE AND CLIDINIUM BROMIDE SCH EACH: 5; 2.5 CAPSULE ORAL at 10:05

## 2021-10-19 RX ADMIN — CEFAZOLIN SCH MLS/HR: 330 INJECTION, POWDER, FOR SOLUTION INTRAMUSCULAR; INTRAVENOUS at 06:50

## 2021-10-19 RX ADMIN — FOLIC ACID SCH MG: 1 TABLET ORAL at 16:02

## 2021-10-19 RX ADMIN — FAMOTIDINE SCH MG: 20 TABLET, FILM COATED ORAL at 20:17

## 2021-10-19 RX ADMIN — CHLORDIAZEPOXIDE HYDROCHLORIDE AND CLIDINIUM BROMIDE SCH: 5; 2.5 CAPSULE ORAL at 15:16

## 2021-10-19 RX ADMIN — CEFAZOLIN SCH MLS/HR: 330 INJECTION, POWDER, FOR SOLUTION INTRAMUSCULAR; INTRAVENOUS at 16:05

## 2021-10-19 RX ADMIN — Medication SCH MG: at 17:32

## 2021-10-19 RX ADMIN — CEFAZOLIN SCH MLS/HR: 330 INJECTION, POWDER, FOR SOLUTION INTRAMUSCULAR; INTRAVENOUS at 20:16

## 2021-10-19 RX ADMIN — Medication SCH MG: at 06:49

## 2021-10-19 RX ADMIN — ASPIRIN 81 MG CHEWABLE TABLET SCH MG: 81 TABLET CHEWABLE at 16:02

## 2021-10-19 NOTE — P.PN
Subjective


Progress Note Date: 10/19/21





Patient denies any new neurological symptoms.  Denies headache.  Patient is 

laying comfortably in the bed.





Telemetry monitoring showing sinus rhythm, with PAC.  Per nursing report, 

patient is 2 person assist.





Objective





- Vital Signs


Vital signs: 


                                   Vital Signs











Temp  98.0 F   10/19/21 08:05


 


Pulse  76   10/19/21 08:05


 


Resp  18   10/19/21 08:05


 


BP  158/69   10/19/21 08:05


 


Pulse Ox  97   10/19/21 08:05








                                 Intake & Output











 10/18/21 10/19/21 10/19/21





 18:59 06:59 18:59


 


Intake Total 510  


 


Balance 510  


 


Weight  87.5 kg 


 


Intake:   


 


  Intake, IV Titration 260  





  Amount   


 


    Sodium Chloride 0.9% 1, 260  





    000 ml @ 130 mls/hr IV .   





    Q7H42M [facility] Rx#:[id number]   


 


  Oral 250  


 


Other:   


 


  Voiding Method Incontinent  Incontinent


 


  # Voids  1 1


 


  # Bowel Movements   1














- Exam





Patient is alert and awake.  Speech and language functions are normal.  Cranial 

nerves are normal muscle strength normal.  No ataxia.





- Labs


CBC & Chem 7: 


                                 10/17/21 21:36





                                 10/17/21 21:36


Labs: 


                  Abnormal Lab Results - Last 24 Hours (Table)











  10/18/21 Range/Units





  15:09 


 


Ammonia  32 H  (<30)  umol/L














Assessment and Plan


Assessment: 





* Peripheral neuropathy, probably due to alcoholism, rule out nutritional 

  deficiency.


* Vitamin B12 deficiency, folate insufficiency.


* Alcoholism, probably in mild withdrawals, improved.


* History of gout.  Rule out exacerbation of gouty arthritis.


* History of falls with history of subdural hematoma.


* Hypertension, uncontrolled.  Accelerated hypertension.


Plan: 





* Patient's peripheral neuropathy is likely related to alcoholism or nutritional

  deficiency.  


* B12 low 240, folate 8.6, MMA, B6, A1c, TSH and RPR pending.  Patient may need 

  an EMG and nerve conduction of lower extremity as an outpatient if not done in

  the past.  Patient started on vitamin B12 replacement and folate replacement.


* Watch for delirium tremens.  Patient counseled about gradual abstinence from 

  alcohol.


* We discussed about MRI of the lumbar spine, but patient repeatedly declined.


* Patient also has probable activation of gout.  May need appropriate treatment 

  for exacerbation of gout.


* Treatment of accelerated hypertension as per IM.


* Patient will be started on aspirin 81 mg daily for stroke prevention.  Patient

  has vascular risk factors.


* 2-D echo on 06/03/2021 shows normal left ventricular size.  Severe concentric 

  LVH.  EF is between 55-60%.  Left atrium is mildly dilated.


* Carotid Doppler from 06/02/2021 shows no significant hemodynamic stenosis.  

  Atherosclerotic plaque.


* PT OT evaluate gait.


* Telemetric monitoring showing sinus rhythm with some PACs.

## 2021-10-19 NOTE — P.PN
Subjective


This is a 70-year-old male with a past medical history of excessive alcohol use,

hypertension, TIA, pancreatitis.  Patient presents to emergency room complaining

of urinary incontinence for about 3 days, abdominal pain, epigastric tenderness,

diarrhea..  He does not follow with a cardiologist. Cardiology was consulted for

elevated troponin.  EKG revealed sinus rhythm, heart rate 86, PACs, Q waves in 

V1-V3, T wave inversions in leads I, aVL, V5 and V6, LVH. Troponin 0.035, 0.029.

 Echocardiogram revealed an EF 5560 percent, LA is mildly dilated, severe 

concentric left ventricular hypertrophy, mild medullary tissue, mild tricuspid 

regurgitation, mild pulmonary hypertension.





Patient seen and examined at bedside, no acute distress.  He does endorse some 

occasional non-radiating, non-exertional, chest discomfort.  He denies shortness

of breath, nausea, vomiting.  Blood pressure 158/69, heart rate 76, afebrile, 

maintaining saturations on room air.  He's currently maintained on atenolol 25 

mg twice a day, lisinopril 20 mg twice a day.





GENERAL: Well-appearing, well-nourished and in no acute distress.


NECK: Supple without JVD or thyromegaly.


LUNGS: Breath sounds clear to auscultation bilaterally. Respiration equal and 

unlabored.  No wheezes, rales or rhonchi.


HEART: Regular rate and rhythm without murmurs, rubs or gallops. S1 and S2 

heard.


EXTREMITIES: Normal range of motion, no edema.  No clubbing or cyanosis. 

Peripheral pulses intact.





ASSESSMENT:


Chest pain, atypical


Excessive alcohol use


Elevated troponin, not indicative of acute coronary syndrome


Epigastric tenderness


Hypertension 


History of TIA/CVA





PLAN:


Recommend Lexiscan stress test to assess for stress induced cardiac ischemia. We

will follow up with the patient on stress test tomorrow vs 10/21. 


Continue current cardiac medications 


NPO after midnight for possible stress test tomorrow 


Alcohol cessation discussed and highly recommend


Further recommendations based on clinical course 








Objective





- Vital Signs


Vital signs: 


                                   Vital Signs











Temp  98.0 F   10/19/21 08:05


 


Pulse  76   10/19/21 08:05


 


Resp  18   10/19/21 08:05


 


BP  158/69   10/19/21 08:05


 


Pulse Ox  97   10/19/21 08:05








                                 Intake & Output











 10/18/21 10/19/21 10/19/21





 18:59 06:59 18:59


 


Intake Total 510  


 


Balance 510  


 


Weight  87.5 kg 


 


Intake:   


 


  Intake, IV Titration 260  





  Amount   


 


    Sodium Chloride 0.9% 1, 260  





    000 ml @ 130 mls/hr IV .   





    Q7H42M [facility] Rx#:[id number]   


 


  Oral 250  


 


Other:   


 


  Voiding Method Incontinent  Incontinent


 


  # Voids  1 1


 


  # Bowel Movements   1














- Labs


CBC & Chem 7: 


                                 10/17/21 21:36





                                 10/17/21 21:36


Labs: 


                  Abnormal Lab Results - Last 24 Hours (Table)











  10/18/21 Range/Units





  15:09 


 


Ammonia  32 H  (<30)  umol/L
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
## 2021-10-20 LAB
ALBUMIN SERPL-MCNC: 3.1 G/DL (ref 3.5–5)
ALP SERPL-CCNC: 79 U/L (ref 38–126)
ALT SERPL-CCNC: 18 U/L (ref 4–49)
ANION GAP SERPL CALC-SCNC: 5 MMOL/L
AST SERPL-CCNC: 32 U/L (ref 17–59)
BASOPHILS # BLD AUTO: 0.1 K/UL (ref 0–0.2)
BASOPHILS NFR BLD AUTO: 1 %
BUN SERPL-SCNC: 15 MG/DL (ref 9–20)
CALCIUM SPEC-MCNC: 8.2 MG/DL (ref 8.4–10.2)
CHLORIDE SERPL-SCNC: 103 MMOL/L (ref 98–107)
CO2 SERPL-SCNC: 28 MMOL/L (ref 22–30)
EOSINOPHIL # BLD AUTO: 0.1 K/UL (ref 0–0.7)
EOSINOPHIL NFR BLD AUTO: 2 %
ERYTHROCYTE [DISTWIDTH] IN BLOOD BY AUTOMATED COUNT: 4.77 M/UL (ref 4.3–5.9)
ERYTHROCYTE [DISTWIDTH] IN BLOOD: 14.2 % (ref 11.5–15.5)
GLUCOSE SERPL-MCNC: 111 MG/DL (ref 74–99)
HCT VFR BLD AUTO: 45.4 % (ref 39–53)
HGB BLD-MCNC: 14.7 GM/DL (ref 13–17.5)
LYMPHOCYTES # SPEC AUTO: 0.9 K/UL (ref 1–4.8)
LYMPHOCYTES NFR SPEC AUTO: 16 %
MCH RBC QN AUTO: 30.9 PG (ref 25–35)
MCHC RBC AUTO-ENTMCNC: 32.5 G/DL (ref 31–37)
MCV RBC AUTO: 95.1 FL (ref 80–100)
MONOCYTES # BLD AUTO: 0.4 K/UL (ref 0–1)
MONOCYTES NFR BLD AUTO: 7 %
NEUTROPHILS # BLD AUTO: 4.1 K/UL (ref 1.3–7.7)
NEUTROPHILS NFR BLD AUTO: 73 %
PLATELET # BLD AUTO: 94 K/UL (ref 150–450)
POTASSIUM SERPL-SCNC: 3.9 MMOL/L (ref 3.5–5.1)
PROT SERPL-MCNC: 5.7 G/DL (ref 6.3–8.2)
SODIUM SERPL-SCNC: 136 MMOL/L (ref 137–145)
WBC # BLD AUTO: 5.7 K/UL (ref 3.8–10.6)

## 2021-10-20 RX ADMIN — CEFAZOLIN SCH MLS/HR: 330 INJECTION, POWDER, FOR SOLUTION INTRAMUSCULAR; INTRAVENOUS at 06:31

## 2021-10-20 RX ADMIN — Medication SCH MG: at 06:31

## 2021-10-20 RX ADMIN — ASPIRIN 81 MG CHEWABLE TABLET SCH MG: 81 TABLET CHEWABLE at 09:56

## 2021-10-20 RX ADMIN — CHLORDIAZEPOXIDE HYDROCHLORIDE AND CLIDINIUM BROMIDE SCH EACH: 5; 2.5 CAPSULE ORAL at 06:31

## 2021-10-20 RX ADMIN — Medication SCH MG: at 16:17

## 2021-10-20 RX ADMIN — FOLIC ACID SCH MG: 1 TABLET ORAL at 09:56

## 2021-10-20 RX ADMIN — CEFAZOLIN SCH MLS/HR: 330 INJECTION, POWDER, FOR SOLUTION INTRAMUSCULAR; INTRAVENOUS at 17:34

## 2021-10-20 RX ADMIN — CYANOCOBALAMIN SCH MCG: 1000 INJECTION, SOLUTION INTRAMUSCULAR; SUBCUTANEOUS at 09:56

## 2021-10-20 RX ADMIN — CHLORDIAZEPOXIDE HYDROCHLORIDE AND CLIDINIUM BROMIDE SCH EACH: 5; 2.5 CAPSULE ORAL at 12:58

## 2021-10-20 RX ADMIN — FAMOTIDINE SCH MG: 20 TABLET, FILM COATED ORAL at 09:56

## 2021-10-20 RX ADMIN — FAMOTIDINE SCH MG: 20 TABLET, FILM COATED ORAL at 20:30

## 2021-10-20 RX ADMIN — PYRIDOXINE HCL TAB 50 MG SCH MG: 50 TAB at 16:16

## 2021-10-20 RX ADMIN — CEFAZOLIN SCH: 330 INJECTION, POWDER, FOR SOLUTION INTRAMUSCULAR; INTRAVENOUS at 12:28

## 2021-10-20 NOTE — P.PN
Subjective


Progress Note Date: 10/20/21





Patient denies any new neurological symptoms.  Denies headache.  Patient is 

laying comfortably in the bed.





Telemetry monitoring showing sinus rhythm, with PAC.  





Per nursing report, patient is 2 person assist.





Objective





- Vital Signs


Vital signs: 


                                   Vital Signs











Temp  99.5 F   10/20/21 12:20


 


Pulse  65   10/20/21 12:20


 


Resp  17   10/20/21 12:20


 


BP  145/68   10/20/21 12:20


 


Pulse Ox  98   10/20/21 12:20








                                 Intake & Output











 10/19/21 10/20/21 10/20/21





 18:59 06:59 18:59


 


Intake Total 240  480


 


Output Total  500 400


 


Balance 240 -500 80


 


Weight  93 kg 


 


Intake:   


 


  Oral 240  480


 


Output:   


 


  Urine  500 400


 


Other:   


 


  Voiding Method Incontinent Urinal Urinal


 


  # Voids 1 1 


 


  # Bowel Movements 1  














- Exam





Patient is alert and awake.  Speech and language functions are normal.  Cranial 

nerves are normal, muscle strength normal.  No ataxia.  Patient not able to get 

up from the bed by 1 nurse with the lap belt.  Tone is mildly increased.  No 

tremors.  No definitive bradykinesia.





- Labs


CBC & Chem 7: 


                                 10/20/21 09:08





                                 10/20/21 09:31


Labs: 


                  Abnormal Lab Results - Last 24 Hours (Table)











  10/20/21 10/20/21 Range/Units





  09:08 09:31 


 


Plt Count  94 L   (150-450)  k/uL


 


Lymphocytes #  0.9 L   (1.0-4.8)  k/uL


 


Sodium   136 L  (137-145)  mmol/L


 


Glucose   111 H  (74-99)  mg/dL


 


Calcium   8.2 L  (8.4-10.2)  mg/dL


 


Total Protein   5.7 L  (6.3-8.2)  g/dL


 


Albumin   3.1 L  (3.5-5.0)  g/dL














Assessment and Plan


Assessment: 





* Peripheral neuropathy, probably due to alcoholism, also underlying nutritional

  deficiency.


* Vitamin B12 deficiency, folate insufficiency, B6 insufficiency.


* Alcoholism, probably in mild withdrawals, improved.


* History of gout.  Rule out exacerbation of gouty arthritis.


* History of falls with history of subdural hematoma.


* Hypertension, uncontrolled.  Accelerated hypertension.


Plan: 





* Patient's peripheral neuropathy is likely related to alcoholism or nutritional

  deficiency.  


* B12 low 240, folate 8.6, MMA borderline 0.37, B6 borderline 8 (5-50), A1c 5.3,

  TSH 2.85, and RPR pending.  Continue B12 replacement.  We will start folate 

  and vitamin B6 replacement as well.  Patient may need an EMG and nerve 

  conduction of lower extremity as an outpatient if not done in the past.  


* Watch for delirium tremens.  Patient counseled about gradual abstinence from 

  alcohol.


* We discussed about MRI of the lumbar spine, but patient repeatedly declined.


* Patient also has probable activation of gout.  May need appropriate treatment 

  for exacerbation of gout.


* Treatment of accelerated hypertension as per IM.


* Patient will be started on aspirin 81 mg daily for stroke prevention.  Patient

  has vascular risk factors.


* 2-D echo on 06/03/2021 shows normal left ventricular size.  Severe concentric 

  LVH.  EF is between 55-60%.  Left atrium is mildly dilated.


* Carotid Doppler from 06/02/2021 shows no significant hemodynamic stenosis.  

  Atherosclerotic plaque.


* PT OT evaluate gait.


* Telemetric monitoring showing sinus rhythm with some PACs.

## 2021-10-20 NOTE — P.PN
Subjective


Progress Note Date: 10/20/21








This is a pleasant 70 years old male with past medical history of CVA/TIA, GERD,

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 

cerebellum/ataxia. hx etoh abuse/withdrawls, past ulcer/gi bleed,, history of 

alcoholic pancreatitis,gout, past fall/subdural hematoma,Anxiety, Depression.  

Patient presents because of urine incontinence over the last 2 weeks which was 

getting messy so he decided to come to the emergency room.  He denies any 

dysuria or urgency.  No suprapubic pain.  Patient states he is still able to be 

sometimes. 


He complains from choking and chronic cough or currently denies back pain


 however he is complaining of from epigastric pain although he states is chronic

with some epigastric tenderness.  Currently denies abdominal pain or chest pain 

or dyspnea.


No coughing.  No nausea vomiting.


Also reports some diarrhea about 4 days duration, he had 3 loose bowel movement 

yesterday.  His right leg is swollen and tender.


This complaining of from weakness and numbness in his both lower extremity, 

although he states this is not new.  He has history of back injury in 1966


He denies smoking, he drink 4-5 beers per day, no liquor.  No illicit drugs.








On 10/19/2021 patient was seen and examined on the telemetry floor, he is 

somnolent responsive in no apparent distress, there is no fever or chills no 

headache or dizziness, no new episodes of chest pain patient had chest pain 

yesterday he was seen by cardiology and recommendation are for a stress test, 

his no shortness of breath no cough no nausea or vomiting no abdominal pain no 

diarrhea and no urinary symptoms








On 10/20/2021 patient alert and oriented 3.  Patient has had no further 

episodes of chest pain.  Plans for possible stress test per cardiology.  Patient

has been evaluated by psychiatry services and cleared.  Awaiting further 

cardiology recommendation patient remains on IV Kefzol for possible pneumonia.  

Blood pressure improved.  At that time patient denies chest pain or shortness 

breath.  Patient denies nausea vomiting or diarrhea.  Patient denies any urinary

burning or frequency





Objective





- Vital Signs


Vital signs: 


                                   Vital Signs











Temp  99.6 F   10/20/21 08:00


 


Pulse  68   10/20/21 08:00


 


Resp  16   10/20/21 08:00


 


BP  152/86   10/20/21 08:00


 


Pulse Ox  97   10/20/21 08:00








                                 Intake & Output











 10/19/21 10/20/21 10/20/21





 18:59 06:59 18:59


 


Intake Total 240  


 


Output Total  500 


 


Balance 240 -500 


 


Weight  93 kg 


 


Intake:   


 


  Oral 240  


 


Output:   


 


  Urine  500 


 


Other:   


 


  Voiding Method Incontinent Urinal Urinal


 


  # Voids 1 1 


 


  # Bowel Movements 1  














- Exam





In general patient is somnolent responsive in no apparent distress


HEENT head normocephalic and atraumatic


Neck is supple no JVD no goiter no lymphadenopathy no carotid bruit


Chest examination is clear to auscultation no crackles no wheezing


Cardiac exam reveals regular heart sounds S1 and S2 no gallops no murmurs


Abdomen is soft nontender no organomegaly with normal bowel sounds


Extremity exam reveals no edema no cyanosis or clubbing


Neurological examination reveals no gross focal deficits





- Labs


CBC & Chem 7: 


                                 10/20/21 09:08





                                 10/20/21 09:31


Labs: 


                  Abnormal Lab Results - Last 24 Hours (Table)











  10/20/21 Range/Units





  09:31 


 


Sodium  136 L  (137-145)  mmol/L


 


Glucose  111 H  (74-99)  mg/dL


 


Calcium  8.2 L  (8.4-10.2)  mg/dL


 


Total Protein  5.7 L  (6.3-8.2)  g/dL


 


Albumin  3.1 L  (3.5-5.0)  g/dL














Assessment and Plan


Plan: 








Alcohol abuse at-risk of withdrawal 





Depression and anxiety





Hypertensive emergency, present on admission





Episode of chest pain patient was evaluated by cardiology and they are 

recommending a stress test





Mild fever present on admission, possibly related to pneumonia, patient was 

started on IV antibiotic Rocephin swallow evaluation requested





Noncompliance to medication





History of CVA/TIA with history of fall and subdural hematoma





History of osteoarthritis





History of ataxia





History of alcoholic pancreatitis





History of gout

## 2021-10-20 NOTE — P.PN
Subjective


This is a 70-year-old male with a past medical history of excessive alcohol use,

hypertension, TIA, pancreatitis.  Patient presents to emergency room complaining

of urinary incontinence for about 3 days, abdominal pain, epigastric tenderness,

diarrhea..  He does not follow with a cardiologist. Cardiology was consulted for

elevated troponin.  EKG revealed sinus rhythm, heart rate 86, PACs, Q waves in 

V1-V3, T wave inversions in leads I, aVL, V5 and V6, LVH. Troponin 0.035, 0.029.

 Echocardiogram revealed an EF 5560 percent, LA is mildly dilated, severe 

concentric left ventricular hypertrophy, mild medullary tissue, mild tricuspid 

regurgitation, mild pulmonary hypertension.





Patient seen and examined at bedside, no acute distress.  He does endorse some 

occasional non-radiating, non-exertional, chest discomfort.  He denies shortness

of breath, nausea, vomiting. He is alert and oriented x 3, however, does 

occasionally seem confused to the current situation.  Blood pressure 150/86, 

heart 60, afebrile, maintaining oxygen saturations 97% on room air.  He's 

currently maintained on atenolol 25 mg twice a day, lisinopril 20 mg twice a 

day.





GENERAL: Well-appearing, well-nourished and in no acute distress.


NECK: Supple without JVD or thyromegaly.


LUNGS: Breath sounds clear to auscultation bilaterally. Respiration equal and 

unlabored.  No wheezes, rales or rhonchi.


HEART: Regular rate and rhythm without murmurs, rubs or gallops. S1 and S2 

heard.


EXTREMITIES: Normal range of motion, no edema.  No clubbing or cyanosis. 

Peripheral pulses intact.





ASSESSMENT:


Chest pain, atypical


Excessive alcohol use


Elevated troponin, not indicative of acute coronary syndrome


Epigastric tenderness


Hypertension 


History of TIA/CVA





PLAN:


We will hold off on stress test today, due to alcohol withdrawal 


Continue current cardiac medications 


Alcohol cessation discussed and highly recommend


Further recommendations based on clinical course 








Objective





- Vital Signs


Vital signs: 


                                   Vital Signs











Temp  99.6 F   10/20/21 08:00


 


Pulse  68   10/20/21 08:00


 


Resp  16   10/20/21 08:00


 


BP  152/86   10/20/21 08:00


 


Pulse Ox  97   10/20/21 08:00








                                 Intake & Output











 10/19/21 10/20/21 10/20/21





 18:59 06:59 18:59


 


Intake Total 240  


 


Output Total  500 


 


Balance 240 -500 


 


Weight  93 kg 


 


Intake:   


 


  Oral 240  


 


Output:   


 


  Urine  500 


 


Other:   


 


  Voiding Method Incontinent Urinal Urinal


 


  # Voids 1 1 


 


  # Bowel Movements 1  














- Labs


CBC & Chem 7: 


                                 10/20/21 09:08





                                 10/20/21 09:31


Labs: 


                  Abnormal Lab Results - Last 24 Hours (Table)











  10/20/21 Range/Units





  09:31 


 


Sodium  136 L  (137-145)  mmol/L


 


Glucose  111 H  (74-99)  mg/dL


 


Calcium  8.2 L  (8.4-10.2)  mg/dL


 


Total Protein  5.7 L  (6.3-8.2)  g/dL


 


Albumin  3.1 L  (3.5-5.0)  g/dL

## 2021-10-21 LAB
ALBUMIN SERPL-MCNC: 3.2 G/DL (ref 3.5–5)
ALP SERPL-CCNC: 76 U/L (ref 38–126)
ALT SERPL-CCNC: 15 U/L (ref 4–49)
ANION GAP SERPL CALC-SCNC: 6 MMOL/L
AST SERPL-CCNC: 32 U/L (ref 17–59)
BASOPHILS # BLD AUTO: 0.1 K/UL (ref 0–0.2)
BASOPHILS NFR BLD AUTO: 1 %
BUN SERPL-SCNC: 14 MG/DL (ref 9–20)
CALCIUM SPEC-MCNC: 8.5 MG/DL (ref 8.4–10.2)
CHLORIDE SERPL-SCNC: 101 MMOL/L (ref 98–107)
CO2 SERPL-SCNC: 27 MMOL/L (ref 22–30)
EOSINOPHIL # BLD AUTO: 0.1 K/UL (ref 0–0.7)
EOSINOPHIL NFR BLD AUTO: 2 %
ERYTHROCYTE [DISTWIDTH] IN BLOOD BY AUTOMATED COUNT: 4.72 M/UL (ref 4.3–5.9)
ERYTHROCYTE [DISTWIDTH] IN BLOOD: 14.8 % (ref 11.5–15.5)
GLUCOSE SERPL-MCNC: 93 MG/DL (ref 74–99)
HCT VFR BLD AUTO: 43.3 % (ref 39–53)
HGB BLD-MCNC: 15.2 GM/DL (ref 13–17.5)
LYMPHOCYTES # SPEC AUTO: 1 K/UL (ref 1–4.8)
LYMPHOCYTES NFR SPEC AUTO: 20 %
MCH RBC QN AUTO: 32.1 PG (ref 25–35)
MCHC RBC AUTO-ENTMCNC: 35 G/DL (ref 31–37)
MCV RBC AUTO: 91.8 FL (ref 80–100)
MONOCYTES # BLD AUTO: 0.4 K/UL (ref 0–1)
MONOCYTES NFR BLD AUTO: 8 %
NEUTROPHILS # BLD AUTO: 3.4 K/UL (ref 1.3–7.7)
NEUTROPHILS NFR BLD AUTO: 67 %
PLATELET # BLD AUTO: 111 K/UL (ref 150–450)
POTASSIUM SERPL-SCNC: 4 MMOL/L (ref 3.5–5.1)
PROT SERPL-MCNC: 6 G/DL (ref 6.3–8.2)
SODIUM SERPL-SCNC: 134 MMOL/L (ref 137–145)
WBC # BLD AUTO: 5.1 K/UL (ref 3.8–10.6)

## 2021-10-21 RX ADMIN — ASPIRIN 81 MG CHEWABLE TABLET SCH MG: 81 TABLET CHEWABLE at 09:14

## 2021-10-21 RX ADMIN — Medication SCH MG: at 15:33

## 2021-10-21 RX ADMIN — FAMOTIDINE SCH MG: 20 TABLET, FILM COATED ORAL at 09:15

## 2021-10-21 RX ADMIN — Medication SCH MG: at 06:47

## 2021-10-21 RX ADMIN — CEFAZOLIN SCH: 330 INJECTION, POWDER, FOR SOLUTION INTRAMUSCULAR; INTRAVENOUS at 11:18

## 2021-10-21 RX ADMIN — FOLIC ACID SCH MG: 1 TABLET ORAL at 09:14

## 2021-10-21 RX ADMIN — CEFAZOLIN SCH: 330 INJECTION, POWDER, FOR SOLUTION INTRAMUSCULAR; INTRAVENOUS at 17:36

## 2021-10-21 RX ADMIN — PYRIDOXINE HCL TAB 50 MG SCH MG: 50 TAB at 09:15

## 2021-10-21 RX ADMIN — FAMOTIDINE SCH MG: 20 TABLET, FILM COATED ORAL at 21:02

## 2021-10-21 RX ADMIN — CYANOCOBALAMIN SCH MCG: 1000 INJECTION, SOLUTION INTRAMUSCULAR; SUBCUTANEOUS at 09:14

## 2021-10-21 RX ADMIN — CEFAZOLIN SCH: 330 INJECTION, POWDER, FOR SOLUTION INTRAMUSCULAR; INTRAVENOUS at 07:31

## 2021-10-21 NOTE — P.PN
Subjective


This is a 70-year-old male with a past medical history of excessive alcohol use,

hypertension, TIA, pancreatitis.  Patient presents to emergency room complaining

of urinary incontinence for about 3 days, abdominal pain, epigastric tenderness,

diarrhea..  He does not follow with a cardiologist. Cardiology was consulted for

elevated troponin.  EKG revealed sinus rhythm, heart rate 86, PACs, Q waves in 

V1-V3, T wave inversions in leads I, aVL, V5 and V6, LVH. Troponin 0.035, 0.029.

 Echocardiogram revealed an EF 5560 percent, LA is mildly dilated, severe 

concentric left ventricular hypertrophy, mild medullary tissue, mild tricuspid 

regurgitation, mild pulmonary hypertension.





Patient seen and examined at bedside, no acute distress. Overnight and this 

morning patient going through alcohol withdrawal, receiving PRN Ativan.  He 

denies shortness of breath, nausea, vomiting. He is alert and oriented x 3, 

however, does occasionally seem confused to the current situation.  Blood 

pressure 158/91, heart 63, afebrile, maintaining oxygen saturations 96% on room 

air.  He's currently maintained on atenolol 25 mg twice a day, lisinopril 20 mg 

twice a day.





GENERAL: In no acute distress.


NECK: Supple without JVD or thyromegaly.


LUNGS: Breath sounds clear to auscultation bilaterally. Respiration equal and 

unlabored.  No wheezes, rales or rhonchi.


HEART: Regular rate and rhythm without murmurs, rubs or gallops. S1 and S2 

heard.


EXTREMITIES: Normal range of motion, no edema.  No clubbing or cyanosis. 

Peripheral pulses intact.





ASSESSMENT:


Chest pain, atypical


Excessive alcohol use


Elevated troponin, not indicative of acute coronary syndrome


Epigastric tenderness


Hypertension 


History of TIA/CVA





PLAN:


We will hold off on stress test today, due to alcohol withdrawal. We will 

perform a stress test in the office as an outpatient. 


Follow up with Dr. Russo outpatient within 1-2 weeks. 


Continue current cardiac medications 


Alcohol cessation discussed and highly recommend


We will sign off at this time.  Please reach out for further questions or 

concerns.








Objective





- Vital Signs


Vital signs: 


                                   Vital Signs











Temp  99 F   10/21/21 09:00


 


Pulse  63   10/21/21 09:00


 


Resp  16   10/21/21 09:00


 


BP  176/86   10/21/21 09:00


 


Pulse Ox  96   10/21/21 09:00








                                 Intake & Output











 10/20/21 10/21/21 10/21/21





 18:59 06:59 18:59


 


Intake Total 720  


 


Output Total 600 200 


 


Balance 120 -200 


 


Weight  90.5 kg 


 


Intake:   


 


  Oral 720  


 


Output:   


 


  Urine 600 200 


 


Other:   


 


  Voiding Method Urinal Urinal Urinal


 


  # Voids 1  














- Labs


CBC & Chem 7: 


                                 10/21/21 09:32





                                 10/21/21 09:32


Labs: 


                  Abnormal Lab Results - Last 24 Hours (Table)











  10/20/21 10/21/21 10/21/21 Range/Units





  09:08 09:32 09:32 


 


Plt Count  94 L  111 L   (150-450)  k/uL


 


Lymphocytes #  0.9 L    (1.0-4.8)  k/uL


 


Sodium    134 L  (137-145)  mmol/L


 


Total Protein    6.0 L  (6.3-8.2)  g/dL


 


Albumin    3.2 L  (3.5-5.0)  g/dL

## 2021-10-22 VITALS — SYSTOLIC BLOOD PRESSURE: 144 MMHG | DIASTOLIC BLOOD PRESSURE: 79 MMHG | HEART RATE: 66 BPM | TEMPERATURE: 98.5 F

## 2021-10-22 VITALS — RESPIRATION RATE: 16 BRPM

## 2021-10-22 RX ADMIN — PYRIDOXINE HCL TAB 50 MG SCH MG: 50 TAB at 08:55

## 2021-10-22 RX ADMIN — Medication SCH MG: at 07:02

## 2021-10-22 RX ADMIN — FOLIC ACID SCH MG: 1 TABLET ORAL at 08:54

## 2021-10-22 RX ADMIN — CYANOCOBALAMIN SCH MCG: 1000 INJECTION, SOLUTION INTRAMUSCULAR; SUBCUTANEOUS at 08:55

## 2021-10-22 RX ADMIN — ASPIRIN 81 MG CHEWABLE TABLET SCH MG: 81 TABLET CHEWABLE at 08:54

## 2021-10-22 RX ADMIN — FAMOTIDINE SCH MG: 20 TABLET, FILM COATED ORAL at 08:54

## 2021-10-22 NOTE — P.DS
Providers


Date of admission: 


10/19/21 14:07





Expected date of discharge: 10/22/21


Attending physician: 


Rajinder Foreman





Consults: 





                                        





10/18/21 07:46


Consult Physician Urgent 


   Consulting Provider: Noman Stokes


   Consult Reason/Comments: depression and alcohol abuse


   Do you want consulting provider notified?: Yes





10/18/21 08:39


Consult Physician Urgent 


   Consulting Provider: Ebony Peng


   Consult Reason/Comments: elevated troponin, hypertension


   Do you want consulting provider notified?: Yes





10/18/21 10:23


Consult Physician Urgent 


   Consulting Provider: lEadia Waters


   Consult Reason/Comments: leg weakness and numbness


   Do you want consulting provider notified?: Yes











Primary care physician: 


Rajinder Foreman





[facility] Course: 





Discharge diagnosis








Alcohol abuse at-risk of withdrawal 





Depression and anxiety





Hypertensive emergency, present on admission





Episode of chest pain patient was evaluated by cardiology and they are 

recommending a stress test





Mild fever present on admission, possibly related to pneumonia, patient was 

started on IV antibiotic Rocephin swallow evaluation requested





Noncompliance to medication





History of CVA/TIA with history of fall and subdural hematoma





History of osteoarthritis





History of ataxia





History of alcoholic pancreatitis





History of gout





Hospital course





This is a pleasant 70 years old male with past medical history of CVA/TIA, GERD,

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 

cerebellum/ataxia. hx etoh abuse/withdrawls, past ulcer/gi bleed,, history of 

alcoholic pancreatitis,gout, past fall/subdural hematoma,Anxiety, Depression.  

Patient presents because of urine incontinence over the last 2 weeks which was 

getting messy so he decided to come to the emergency room.  He denies any 

dysuria or urgency.  No suprapubic pain.  Patient states he is still able to be 

sometimes. 


He complains from choking and chronic cough or currently denies back pain


 however he is complaining of from epigastric pain although he states is chronic

with some epigastric tenderness.  Currently denies abdominal pain or chest pain 

or dyspnea.


No coughing.  No nausea vomiting.


Also reports some diarrhea about 4 days duration, he had 3 loose bowel movement 

yesterday.  His right leg is swollen and tender.


This complaining of from weakness and numbness in his both lower extremity, 

although he states this is not new.  He has history of back injury in 1966


He denies smoking, he drink 4-5 beers per day, no liquor.  No illicit drugs.








On 10/19/2021 patient was seen and examined on the telemetry floor, he is 

somnolent responsive in no apparent distress, there is no fever or chills no 

headache or dizziness, no new episodes of chest pain patient had chest pain 

yesterday he was seen by cardiology and recommendation are for a stress test, 

his no shortness of breath no cough no nausea or vomiting no abdominal pain no 

diarrhea and no urinary symptoms








On 10/20/2021 patient alert and oriented 3.  Patient has had no further 

episodes of chest pain.  Plans for possible stress test per cardiology.  Patient

has been evaluated by psychiatry services and cleared.  Awaiting further 

cardiology recommendation patient remains on IV Kefzol for possible pneumonia.  

Blood pressure improved.  At that time patient denies chest pain or shortness 

breath.  Patient denies nausea vomiting or diarrhea.  Patient denies any urinary

burning or frequency





On 10/22/2021 patient is alert and oriented 3.  Per cardiology plans for 

outpatient stress test in office.  Patient will be DC'd on Ceftin for one week. 

Patient will also be given when necessary Ativan for possible alcohol 

withdrawal.  This time patient denies chest pain or shortness of breath.  

Patient denies nausea vomiting or diarrhea.  Patient denies urinary burning or 

frequency


Patient Condition at Discharge: Stable





Plan - Discharge Summary


Discharge Rx Participant: Yes


New Discharge Prescriptions: 


New


   Aspirin 81 mg PO DAILY 30 Days #30 tab


   Pyridoxine [Vitamin B-6] 50 mg PO DAILY 30 Days #30 tab


   Cefuroxime Axetil [Ceftin] 500 mg PO BID 7 Days #14 tab


   Folic Acid 1 mg PO DAILY 30 Days #30 tab


   Thiamine [Vitamin B-1] 100 mg PO BID-W/MEALS 30 Days #60 tab





Continue


   atenoloL [Tenormin] 25 mg PO BID  tab


   lisinopriL [Zestril] 20 mg PO BID  tab


Discharge Medication List





atenoloL [Tenormin] 25 mg PO BID  tab 06/02/21 [Rx]


lisinopriL [Zestril] 20 mg PO BID  tab 06/02/21 [Rx]


Aspirin 81 mg PO DAILY 30 Days #30 tab 10/22/21 [Rx]


Cefuroxime Axetil [Ceftin] 500 mg PO BID 7 Days #14 tab 10/22/21 [Rx]


Folic Acid 1 mg PO DAILY 30 Days #30 tab 10/22/21 [Rx]


Pyridoxine [Vitamin B-6] 50 mg PO DAILY 30 Days #30 tab 10/22/21 [Rx]


Thiamine [Vitamin B-1] 100 mg PO BID-W/MEALS 30 Days #60 tab 10/22/21 [Rx]








Follow up Appointment(s)/Referral(s): 


Jeremiah Russo MD [STAFF PHYSICIAN] - 2 Weeks


Rajinder Foreman MD [Primary Care Provider] - 1-2 days


Discharge/Stand Alone Forms:  [employer] Meetings [place], Who Do I Call?, Community 

Resources, Help In The Home, Outpatient Counseling

## 2022-04-04 ENCOUNTER — HOSPITAL ENCOUNTER (INPATIENT)
Dept: HOSPITAL 47 - EC | Age: 72
LOS: 4 days | Discharge: NURSING, REHAB OR LONG-TERM CARE | DRG: 897 | End: 2022-04-08
Attending: INTERNAL MEDICINE | Admitting: INTERNAL MEDICINE
Payer: MEDICARE

## 2022-04-04 DIAGNOSIS — F32.A: ICD-10-CM

## 2022-04-04 DIAGNOSIS — L03.115: ICD-10-CM

## 2022-04-04 DIAGNOSIS — N17.9: ICD-10-CM

## 2022-04-04 DIAGNOSIS — I49.3: ICD-10-CM

## 2022-04-04 DIAGNOSIS — Z91.81: ICD-10-CM

## 2022-04-04 DIAGNOSIS — I48.0: ICD-10-CM

## 2022-04-04 DIAGNOSIS — I10: ICD-10-CM

## 2022-04-04 DIAGNOSIS — F10.139: Primary | ICD-10-CM

## 2022-04-04 DIAGNOSIS — Z20.822: ICD-10-CM

## 2022-04-04 DIAGNOSIS — L97.519: ICD-10-CM

## 2022-04-04 DIAGNOSIS — Z82.49: ICD-10-CM
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
## 2022-04-04 DIAGNOSIS — E78.5: ICD-10-CM

## 2022-04-04 DIAGNOSIS — E86.0: ICD-10-CM

## 2022-04-04 DIAGNOSIS — Z86.73: ICD-10-CM

## 2022-04-04 DIAGNOSIS — K70.10: ICD-10-CM

## 2022-04-04 DIAGNOSIS — I47.2: ICD-10-CM
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
## 2022-04-04 DIAGNOSIS — T33.832A: ICD-10-CM

## 2022-04-04 DIAGNOSIS — F41.9: ICD-10-CM

## 2022-04-04 DIAGNOSIS — Z91.19: ICD-10-CM
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
## 2022-04-04 DIAGNOSIS — T33.831A: ICD-10-CM

## 2022-04-04 DIAGNOSIS — L97.529: ICD-10-CM

## 2022-04-04 DIAGNOSIS — M62.82: ICD-10-CM

## 2022-04-04 DIAGNOSIS — Z82.5: ICD-10-CM

## 2022-04-04 DIAGNOSIS — I27.20: ICD-10-CM

## 2022-04-04 DIAGNOSIS — I73.9: ICD-10-CM

## 2022-04-04 DIAGNOSIS — L03.116: ICD-10-CM

## 2022-04-04 LAB
ALBUMIN SERPL-MCNC: 3.8 G/DL (ref 3.5–5)
ALP SERPL-CCNC: 71 U/L (ref 38–126)
ALT SERPL-CCNC: 99 U/L (ref 4–49)
ANION GAP SERPL CALC-SCNC: 14 MMOL/L
APTT BLD: 24.5 SEC (ref 22–30)
AST SERPL-CCNC: 220 U/L (ref 17–59)
BASOPHILS # BLD AUTO: 0.1 K/UL (ref 0–0.2)
BASOPHILS NFR BLD AUTO: 1 %
BUN SERPL-SCNC: 35 MG/DL (ref 9–20)
CALCIUM SPEC-MCNC: 8.6 MG/DL (ref 8.4–10.2)
CHLORIDE SERPL-SCNC: 100 MMOL/L (ref 98–107)
CK SERPL-CCNC: 3852 U/L (ref 55–170)
CO2 SERPL-SCNC: 22 MMOL/L (ref 22–30)
EOSINOPHIL # BLD AUTO: 0.1 K/UL (ref 0–0.7)
EOSINOPHIL NFR BLD AUTO: 1 %
ERYTHROCYTE [DISTWIDTH] IN BLOOD BY AUTOMATED COUNT: 5.14 M/UL (ref 4.3–5.9)
ERYTHROCYTE [DISTWIDTH] IN BLOOD: 14.9 % (ref 11.5–15.5)
GLUCOSE SERPL-MCNC: 103 MG/DL (ref 74–99)
HCT VFR BLD AUTO: 47.1 % (ref 39–53)
HGB BLD-MCNC: 16.3 GM/DL (ref 13–17.5)
INR PPP: 0.9 (ref ?–1.2)
LYMPHOCYTES # SPEC AUTO: 0.9 K/UL (ref 1–4.8)
LYMPHOCYTES NFR SPEC AUTO: 9 %
MAGNESIUM SPEC-SCNC: 2.2 MG/DL (ref 1.6–2.3)
MCH RBC QN AUTO: 31.6 PG (ref 25–35)
MCHC RBC AUTO-ENTMCNC: 34.5 G/DL (ref 31–37)
MCV RBC AUTO: 91.7 FL (ref 80–100)
MONOCYTES # BLD AUTO: 0.6 K/UL (ref 0–1)
MONOCYTES NFR BLD AUTO: 6 %
NEUTROPHILS # BLD AUTO: 8.3 K/UL (ref 1.3–7.7)
NEUTROPHILS NFR BLD AUTO: 82 %
PLATELET # BLD AUTO: 184 K/UL (ref 150–450)
POTASSIUM SERPL-SCNC: 4.8 MMOL/L (ref 3.5–5.1)
PROT SERPL-MCNC: 6.7 G/DL (ref 6.3–8.2)
PT BLD: 10 SEC (ref 9–12)
SODIUM SERPL-SCNC: 136 MMOL/L (ref 137–145)
WBC # BLD AUTO: 10.2 K/UL (ref 3.8–10.6)

## 2022-04-04 PROCEDURE — 84132 ASSAY OF SERUM POTASSIUM: CPT

## 2022-04-04 PROCEDURE — 96375 TX/PRO/DX INJ NEW DRUG ADDON: CPT

## 2022-04-04 PROCEDURE — 93306 TTE W/DOPPLER COMPLETE: CPT

## 2022-04-04 PROCEDURE — 85730 THROMBOPLASTIN TIME PARTIAL: CPT

## 2022-04-04 PROCEDURE — 83605 ASSAY OF LACTIC ACID: CPT

## 2022-04-04 PROCEDURE — 81003 URINALYSIS AUTO W/O SCOPE: CPT

## 2022-04-04 PROCEDURE — 80053 COMPREHEN METABOLIC PANEL: CPT

## 2022-04-04 PROCEDURE — 93970 EXTREMITY STUDY: CPT

## 2022-04-04 PROCEDURE — 36415 COLL VENOUS BLD VENIPUNCTURE: CPT

## 2022-04-04 PROCEDURE — 84550 ASSAY OF BLOOD/URIC ACID: CPT

## 2022-04-04 PROCEDURE — 99291 CRITICAL CARE FIRST HOUR: CPT

## 2022-04-04 PROCEDURE — 84100 ASSAY OF PHOSPHORUS: CPT

## 2022-04-04 PROCEDURE — 72125 CT NECK SPINE W/O DYE: CPT

## 2022-04-04 PROCEDURE — 70450 CT HEAD/BRAIN W/O DYE: CPT

## 2022-04-04 PROCEDURE — 80320 DRUG SCREEN QUANTALCOHOLS: CPT

## 2022-04-04 PROCEDURE — 87635 SARS-COV-2 COVID-19 AMP PRB: CPT

## 2022-04-04 PROCEDURE — 93005 ELECTROCARDIOGRAM TRACING: CPT

## 2022-04-04 PROCEDURE — 84484 ASSAY OF TROPONIN QUANT: CPT

## 2022-04-04 PROCEDURE — 82550 ASSAY OF CK (CPK): CPT

## 2022-04-04 PROCEDURE — 96366 THER/PROPH/DIAG IV INF ADDON: CPT

## 2022-04-04 PROCEDURE — 83735 ASSAY OF MAGNESIUM: CPT

## 2022-04-04 PROCEDURE — 96372 THER/PROPH/DIAG INJ SC/IM: CPT

## 2022-04-04 PROCEDURE — 85610 PROTHROMBIN TIME: CPT

## 2022-04-04 PROCEDURE — 71046 X-RAY EXAM CHEST 2 VIEWS: CPT

## 2022-04-04 PROCEDURE — 96365 THER/PROPH/DIAG IV INF INIT: CPT

## 2022-04-04 PROCEDURE — 85025 COMPLETE CBC W/AUTO DIFF WBC: CPT

## 2022-04-04 RX ADMIN — CEFAZOLIN SCH MLS/HR: 330 INJECTION, POWDER, FOR SOLUTION INTRAMUSCULAR; INTRAVENOUS at 20:00

## 2022-04-04 RX ADMIN — DILTIAZEM HYDROCHLORIDE SCH MLS/HR: 5 INJECTION INTRAVENOUS at 15:34

## 2022-04-04 RX ADMIN — ENOXAPARIN SODIUM SCH MG: 60 INJECTION SUBCUTANEOUS at 21:06

## 2022-04-04 NOTE — CT
EXAMINATION TYPE: CT brain cspine wo con

 

DATE OF EXAM: 4/4/2022

 

COMPARISON: CT brain and cervical spine September 17, 2018

 

HISTORY: Fall injury with headache and neck pain.

 

CT DLP: 1586.1 mGycm. Automated Exposure Control for Dose Reduction was Utilized.

 

 

TECHNIQUE: CT scan of the head and cervical spine are performed without contrast.

 

FINDINGS:   There is no acute intracranial hemorrhage or midline shift identified. Mild to moderate v
entricular and sulcal prominence redemonstrated.  Moderate to severe low-attenuation in the deep and 
periventricular white matter. The calvarium is intact. Globes are intact and visualized paranasal sin
uses are clear.

 

Cervical spine is visualized in its entirety from C1 through upper thoracic levels and demonstrates l
evoconvex scoliosis centered upper thoracic spine.  Prevertebral soft tissue remains within normal li
mits.  The C1-C2 articulation remains within normal limits on coronal images.  Vertebral body heights
 are maintained. Minimal grade 1 retrolisthesis C3 on C4 redemonstrated. Moderate-to-severe multileve
l anterior spurring with some bridging osteophytes in the midthoracic spine redemonstrated. No acute 
fracture or dislocation.

 

No grossly preserved. Axial images show multilevel uncovertebral facet degenerative changes. Thyroid 
gland is normal in size. Lung apices show no pneumothorax.

 

IMPRESSION:

1. There is no acute fracture or dislocation evident in the cervical spine.

2. No acute intracranial hemorrhage or midline shift is seen.

## 2022-04-04 NOTE — XR
EXAMINATION TYPE: XR chest 2V

 

DATE OF EXAM: 4/4/2022

 

COMPARISON: 10/17/2021

 

HISTORY: 71-year-old male with weakness

 

TECHNIQUE:  AP and lateral views

 

FINDINGS:  

Heart limits of normal in size. Similar asymmetric elevation right hemidiaphragm. No consolidation. P
ossible trace effusion on the left seen on the lateral view.

 

 

IMPRESSION:  

 

1. Unchanged asymmetric elevation right hemidiaphragm. If concern for hemidiaphragmatic paralysis, a 
fluoroscopic sniff test can be performed.

2. There appears to be trace left pleural effusion on the lateral view.
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
## 2022-04-05 LAB
ALBUMIN SERPL-MCNC: 3.4 G/DL (ref 3.5–5)
ALP SERPL-CCNC: 74 U/L (ref 38–126)
ALT SERPL-CCNC: 84 U/L (ref 4–49)
ANION GAP SERPL CALC-SCNC: 9 MMOL/L
AST SERPL-CCNC: 145 U/L (ref 17–59)
BASOPHILS # BLD AUTO: 0.1 K/UL (ref 0–0.2)
BASOPHILS NFR BLD AUTO: 1 %
BUN SERPL-SCNC: 28 MG/DL (ref 9–20)
CALCIUM SPEC-MCNC: 8.3 MG/DL (ref 8.4–10.2)
CHLORIDE SERPL-SCNC: 103 MMOL/L (ref 98–107)
CO2 SERPL-SCNC: 27 MMOL/L (ref 22–30)
EOSINOPHIL # BLD AUTO: 0.2 K/UL (ref 0–0.7)
EOSINOPHIL NFR BLD AUTO: 3 %
ERYTHROCYTE [DISTWIDTH] IN BLOOD BY AUTOMATED COUNT: 4.71 M/UL (ref 4.3–5.9)
ERYTHROCYTE [DISTWIDTH] IN BLOOD: 14.7 % (ref 11.5–15.5)
GLUCOSE SERPL-MCNC: 119 MG/DL (ref 74–99)
HCT VFR BLD AUTO: 44.5 % (ref 39–53)
HGB BLD-MCNC: 14.2 GM/DL (ref 13–17.5)
LYMPHOCYTES # SPEC AUTO: 0.9 K/UL (ref 1–4.8)
LYMPHOCYTES NFR SPEC AUTO: 14 %
MCH RBC QN AUTO: 30.1 PG (ref 25–35)
MCHC RBC AUTO-ENTMCNC: 31.9 G/DL (ref 31–37)
MCV RBC AUTO: 94.3 FL (ref 80–100)
MONOCYTES # BLD AUTO: 0.4 K/UL (ref 0–1)
MONOCYTES NFR BLD AUTO: 7 %
NEUTROPHILS # BLD AUTO: 4.9 K/UL (ref 1.3–7.7)
NEUTROPHILS NFR BLD AUTO: 74 %
PLATELET # BLD AUTO: 160 K/UL (ref 150–450)
POTASSIUM SERPL-SCNC: 3.5 MMOL/L (ref 3.5–5.1)
PROT SERPL-MCNC: 6.1 G/DL (ref 6.3–8.2)
SODIUM SERPL-SCNC: 139 MMOL/L (ref 137–145)
WBC # BLD AUTO: 6.6 K/UL (ref 3.8–10.6)

## 2022-04-05 RX ADMIN — CEFAZOLIN SCH: 330 INJECTION, POWDER, FOR SOLUTION INTRAMUSCULAR; INTRAVENOUS at 18:22

## 2022-04-05 RX ADMIN — ENOXAPARIN SODIUM SCH MG: 60 INJECTION SUBCUTANEOUS at 21:00

## 2022-04-05 RX ADMIN — ENOXAPARIN SODIUM SCH MG: 60 INJECTION SUBCUTANEOUS at 12:34

## 2022-04-05 RX ADMIN — DILTIAZEM HYDROCHLORIDE SCH MLS/HR: 5 INJECTION INTRAVENOUS at 06:52

## 2022-04-05 RX ADMIN — CEFAZOLIN SCH MLS/HR: 330 INJECTION, POWDER, FOR SOLUTION INTRAMUSCULAR; INTRAVENOUS at 12:34

## 2022-04-05 RX ADMIN — Medication SCH MG: at 19:00

## 2022-04-05 RX ADMIN — METOPROLOL TARTRATE SCH MG: 50 TABLET, FILM COATED ORAL at 21:00

## 2022-04-05 RX ADMIN — Medication SCH MG: at 06:40

## 2022-04-05 RX ADMIN — CEFAZOLIN SCH MLS/HR: 330 INJECTION, POWDER, FOR SOLUTION INTRAMUSCULAR; INTRAVENOUS at 04:00

## 2022-04-05 RX ADMIN — METOPROLOL TARTRATE SCH MG: 50 TABLET, FILM COATED ORAL at 12:34

## 2022-04-05 RX ADMIN — HYDROCODONE BITARTRATE AND ACETAMINOPHEN PRN EACH: 5; 325 TABLET ORAL at 20:59
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
## 2022-04-05 NOTE — P.PN
Subjective


Progress Note Date: 04/05/22








Roshan Mir, is a 71-year-old male who presented to [facility] emergency room via EMS, in poor condition apparently patient has been 

drinking at home until a few days ago when he fell to the floor and was unable 

to stand due to severe pain in bilateral lower extremities, he stated that he 

has been crawling around at his home for few days, it is not clear who called 

EMS, patient was found on the floor in his home, with his home being very cold, 

he was confused, she was brought into emergency room for further evaluation and 

treatment.


Patient was evaluated in the emergency room, he was a very poor historian and 

unable to contribute to the history of the last few days, he was complaining of 

bilateral feet pain, otherwise he denied any complaints at this time. vital 

examination on presentation revealed a temperature of 96.8 pulse 124 respiration

22 blood pressure 125/104 pulse ox 97% on room air.


Laboratory data revealed a white blood count of 10.2 hemoglobin 16.3 platelet 

count 184 sodium 136 potassium 4.8 chloride 100 CO2 22 BUN 35 creatinine 0.91 

lactic acid 1.6 AST 02/20/1980 LT 99 total bilirubin 1.4 creatinine kinase 3852 

troponin 0.033 alcohol level less than 10 urine analysis, urine tox screen, uric

acid level were ordered but are not available yet.


Testing in the emergency room revealed, EKG revealed atrial fibrillation with 

rapid ventricular response, computed tomography scan of the head and neck 

without contrast was done in the emergency room and did not reveal any evidence 

of intracranial hemorrhage or cervical spine fracture, chest x-ray was done in 

the emergency room and revealed asymmetric elevation in the right hemidiaphragm 

unchanged from previous exam in October 2021, patient also had a trace left 

pleural effusion.


Patient was started on IV Cardizem in the emergency room , he was admitted to 

telemetry floor , cardiology consultation was requested , in regard to atrial 

fibrillation with rapid ventricular response .








On 04/05/2021 patient was seen and examined on the medical floor he is alert and

oriented 3 in no apparent distress he is still complaining of pain in the 

bilateral lower extremities otherwise he denies any complaints, there is no 

fever or chills no headache or dizziness no chest pain no shortness of breath no

cough no nausea or vomiting no abdominal pain no diarrhea no blood in the stools

no burning with urination no frequency or urgency and no hematuria





Objective





- Vital Signs


Vital signs: 


                                   Vital Signs











Temp  98.4 F   04/05/22 08:10


 


Pulse  97   04/05/22 08:10


 


Resp  17   04/05/22 08:10


 


BP  139/66   04/05/22 08:10


 


Pulse Ox  95   04/05/22 08:10








                                 Intake & Output











 04/04/22 04/05/22 04/05/22





 18:59 06:59 18:59


 


Intake Total  76.5 


 


Balance  76.5 


 


Weight 86.183 kg  


 


Intake:   


 


  Intake, IV Titration  76.5 





  Amount   


 


    Diltiazem 125 mg In  76.5 





    Sodium Chloride 0.9% 100   





    ml @ 5 MG/HR 5 mls/hr IV   





    .Q24H [facility] Rx#:[id number]   


 


Other:   


 


  Voiding Method  Urinal Indwelling Catheter


 


  # Voids  1 














- Exam








In general patient is alert, slightly confused, in no distress


HEENT head normocephalic and atraumatic


Neck is supple no JVD no goiter no lymphadenopathy no carotid bruit


Chest examination is clear to auscultation no crackles no wheezing


Cardiac exam reveals irregular heart sounds S1 and S2 no gallops no murmurs, 

with tachycardia


Abdomen is soft nontender no organomegaly with normal bowel sounds


Extremity exam reveals no edema, there is reddish discoloration involving the 

right foot and ankle with multiple small ulcerations, the left lower extremity r

eveals left erythema but also is tender and has multiple small ulcers


Neurological examination reveals no gross focal deficit patient is moving all 4 

extremities





- Labs


CBC & Chem 7: 


                                 04/05/22 07:52





                                 04/05/22 07:52


Labs: 


                  Abnormal Lab Results - Last 24 Hours (Table)











  04/04/22 04/04/22 04/05/22 Range/Units





  14:14 14:14 07:52 


 


Neutrophils #  8.3 H    (1.3-7.7)  k/uL


 


Lymphocytes #  0.9 L   0.9 L  (1.0-4.8)  k/uL


 


Sodium   136 L   (137-145)  mmol/L


 


BUN   35 H   (9-20)  mg/dL


 


Glucose   103 H   (74-99)  mg/dL


 


Calcium     (8.4-10.2)  mg/dL


 


Total Bilirubin   1.4 H   (0.2-1.3)  mg/dL


 


AST   220 H   (17-59)  U/L


 


ALT   99 H   (4-49)  U/L


 


Creatine Kinase   3852 H*   ()  U/L


 


Total Protein     (6.3-8.2)  g/dL


 


Albumin     (3.5-5.0)  g/dL














  04/05/22 Range/Units





  07:52 


 


Neutrophils #   (1.3-7.7)  k/uL


 


Lymphocytes #   (1.0-4.8)  k/uL


 


Sodium   (137-145)  mmol/L


 


BUN  28 H  (9-20)  mg/dL


 


Glucose  119 H  (74-99)  mg/dL


 


Calcium  8.3 L  (8.4-10.2)  mg/dL


 


Total Bilirubin   (0.2-1.3)  mg/dL


 


AST  145 H  (17-59)  U/L


 


ALT  84 H  (4-49)  U/L


 


Creatine Kinase   ()  U/L


 


Total Protein  6.1 L  (6.3-8.2)  g/dL


 


Albumin  3.4 L  (3.5-5.0)  g/dL














Assessment and Plan


Plan: 








1.  History of excessive alcohol use, with early alcohol withdrawal, patient was

started on CIWA protocol in the emergency room, and admitted to telemetry floor.





2.  Atrial fibrillation with rapid ventricular response, patient was started on 

IV Cardizem in the emergency room, at this time I will add subcu Lovenox at full

therapeutic dose.





3.  Bilateral lower extremity cellulitis, right worse than the left, with 

multiple small ulcerations, there is a possibility of peripheral vascular 

disease, patient is started on subcu Lovenox, he will be started on IV antib

iotics, vascular consultation will be requested.





4.  Evidence of dehydration with acute kidney injury, with elevated BUN 35, 

patient was started on IV fluid in the emergency room





5.  Evidence of rhabdomyolysis, with elevated creatinine kinase at 3852, patient

was started on IV fluid Will monitor labs closely.





6.  Elevated liver enzymes likely related to acute alcoholic hepatitis, will 

monitor liver enzymes closely





7.  Underlying history of gout, uric acid level was ordered and is still pending

at this time





8.  Underlying history of hypertension





9.  Underlying history of hyperlipidemia





10.  Underlying history of stroke





11.  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





12.  Previous history of GI bleed, will monitor hemoglobin closely





13.  Previous history of subdural hematoma





14.  Noncompliance with medical therapy, patient was last admitted to the 

hospital in October 2021, he had multiple medications including blood pressure 

medications, at this time he stated that he does not take any medications at 

home, he did not follow-up with any physician after his discharge last time.








At this time patient is admitted to telemetry floor


Continue IV Cardizem


Continue CIWA protocol


Add IV cefazolin


Add subcu Lovenox at therapeutic dose and monitor CBC closely


Consults vascular surgery regarding bilateral lower extremity erythema and 

possible peripheral arterial disease


Will follow closely prognosis is guarded

## 2022-04-05 NOTE — ECHOF
Referral Reason:new onset a fib



MEASUREMENTS

--------

HEIGHT: 152.4 cm

WEIGHT: 86.2 kg

BP: 

RVIDd:   3.0 cm     (< 3.3)

IVSd:   1.4 cm     (0.6 - 1.1)

LVIDd:   4.2 cm     (3.9 - 5.3)

LVPWd:   1.7 cm     (0.6 - 1.1)

IVSs:   2.1 cm

LVIDs:   3.0 cm

LVPWs:   2.0 cm

LA Diam:   4.1 cm     (2.7 - 3.8)

Ao Diam:   4.1 cm     (2.0 - 3.7)

MV EXCURSION:   23.427 mm     (> 18.000)

MV EF SLOPE:   99 mm/s     (70 - 150)

EPSS:   0.4 cm

RAP:   5.00 mmHg

RVSP:   17.78 mmHg







FINDINGS

--------

Atrial fibrillation.

This was a technically difficult study with suboptimal views.

The left ventricular size is normal.   There is moderate concentric left ventricular hypertrophy.   O
verall left ventricular systolic function is normal with, an EF between 55 - 60 %.

The RV was not well visualized.

The left atrium is mildly dilated.

The right atrial size is normal.

The aortic valve was not well visualized.

Mild mitral annular calcification present.   Mild mitral regurgitation is present.

The tricuspid valve was not well visualized.

The pulmonic valve was not well visualized.

The aortic root is mildy dilated.

There is a trivial pericardial effusion present.



CONCLUSIONS

--------

1. This was a technically difficult study with suboptimal views.

2. There is moderate concentric left ventricular hypertrophy.

3. Overall left ventricular systolic function is normal with, an EF between 55 - 60 %.

4. The left atrium is mildly dilated.

5. The aortic valve was not well visualized.

6. Mild mitral regurgitation is present.

7. The aortic root is mildy dilated.

8. There is a trivial pericardial effusion present.





SONOGRAPHER: Jaquelin Alex RDCS

## 2022-04-05 NOTE — US
EXAMINATION TYPE: US venous doppler duplex LE BI

 

DATE OF EXAM: 4/5/2022 7:33 AM

 

COMPARISON: 10/18/2021

 

CLINICAL HISTORY: lower etremities pain and erythem. edema bilaterally, no h/o dvt

 

SIDE PERFORMED: Bilateral  

 

TECHNIQUE:  The lower extremity deep venous system is examined utilizing real time linear array sonog
blas with graded compression, doppler sonography and color-flow sonography.

 

VESSELS IMAGED:

Common Femoral Vein

Deep Femoral Vein

Greater Saphenous Vein *

Femoral Vein

Popliteal Vein

Small Saphenous Vein *

Proximal Calf Veins

(* superficial vessels)

 

 

 

Right Leg:  Negative for DVT

 

Left Leg:  Negative for DVT

 

 

 

IMPRESSION:  Grayscale, color doppler, spectral doppler imaging performed of the deep veins of the lo
wer extremities.  There is normal flow, compressibility, vascular waveforms.
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
## 2022-04-06 LAB
ALBUMIN SERPL-MCNC: 2.7 G/DL (ref 3.5–5)
ALP SERPL-CCNC: 58 U/L (ref 38–126)
ALT SERPL-CCNC: 55 U/L (ref 4–49)
ANION GAP SERPL CALC-SCNC: 4 MMOL/L
AST SERPL-CCNC: 77 U/L (ref 17–59)
BASOPHILS # BLD AUTO: 0 K/UL (ref 0–0.2)
BASOPHILS NFR BLD AUTO: 1 %
BUN SERPL-SCNC: 20 MG/DL (ref 9–20)
CALCIUM SPEC-MCNC: 7.6 MG/DL (ref 8.4–10.2)
CHLORIDE SERPL-SCNC: 107 MMOL/L (ref 98–107)
CK SERPL-CCNC: 588 U/L (ref 55–170)
CO2 SERPL-SCNC: 29 MMOL/L (ref 22–30)
EOSINOPHIL # BLD AUTO: 0.2 K/UL (ref 0–0.7)
EOSINOPHIL NFR BLD AUTO: 4 %
ERYTHROCYTE [DISTWIDTH] IN BLOOD BY AUTOMATED COUNT: 4.2 M/UL (ref 4.3–5.9)
ERYTHROCYTE [DISTWIDTH] IN BLOOD: 14.5 % (ref 11.5–15.5)
GLUCOSE SERPL-MCNC: 97 MG/DL (ref 74–99)
HCT VFR BLD AUTO: 39.3 % (ref 39–53)
HGB BLD-MCNC: 12.7 GM/DL (ref 13–17.5)
KETONES UR QL STRIP.AUTO: (no result)
LYMPHOCYTES # SPEC AUTO: 0.8 K/UL (ref 1–4.8)
LYMPHOCYTES NFR SPEC AUTO: 19 %
MCH RBC QN AUTO: 30.3 PG (ref 25–35)
MCHC RBC AUTO-ENTMCNC: 32.4 G/DL (ref 31–37)
MCV RBC AUTO: 93.6 FL (ref 80–100)
MONOCYTES # BLD AUTO: 0.4 K/UL (ref 0–1)
MONOCYTES NFR BLD AUTO: 8 %
NEUTROPHILS # BLD AUTO: 3 K/UL (ref 1.3–7.7)
NEUTROPHILS NFR BLD AUTO: 67 %
PH UR: 5.5 [PH] (ref 5–8)
PLATELET # BLD AUTO: 118 K/UL (ref 150–450)
POTASSIUM SERPL-SCNC: 3.3 MMOL/L (ref 3.5–5.1)
PROT SERPL-MCNC: 5.3 G/DL (ref 6.3–8.2)
SODIUM SERPL-SCNC: 140 MMOL/L (ref 137–145)
SP GR UR: 1.03 (ref 1–1.03)
UROBILINOGEN UR QL STRIP: <2 MG/DL (ref ?–2)
WBC # BLD AUTO: 4.5 K/UL (ref 3.8–10.6)

## 2022-04-06 RX ADMIN — POTASSIUM CHLORIDE SCH MEQ: 20 TABLET, EXTENDED RELEASE ORAL at 06:59

## 2022-04-06 RX ADMIN — ENOXAPARIN SODIUM SCH MG: 60 INJECTION SUBCUTANEOUS at 09:09

## 2022-04-06 RX ADMIN — CEFAZOLIN SCH: 330 INJECTION, POWDER, FOR SOLUTION INTRAMUSCULAR; INTRAVENOUS at 01:06

## 2022-04-06 RX ADMIN — METOPROLOL TARTRATE SCH MG: 50 TABLET, FILM COATED ORAL at 20:42

## 2022-04-06 RX ADMIN — POTASSIUM CHLORIDE SCH MEQ: 20 TABLET, EXTENDED RELEASE ORAL at 09:09

## 2022-04-06 RX ADMIN — Medication SCH MG: at 06:42

## 2022-04-06 RX ADMIN — Medication SCH MG: at 16:29

## 2022-04-06 RX ADMIN — CEFAZOLIN SCH MLS/HR: 330 INJECTION, POWDER, FOR SOLUTION INTRAMUSCULAR; INTRAVENOUS at 06:40

## 2022-04-06 RX ADMIN — HYDROCODONE BITARTRATE AND ACETAMINOPHEN PRN EACH: 5; 325 TABLET ORAL at 12:57

## 2022-04-06 RX ADMIN — HYDROCODONE BITARTRATE AND ACETAMINOPHEN PRN EACH: 5; 325 TABLET ORAL at 21:00

## 2022-04-06 RX ADMIN — POTASSIUM CHLORIDE SCH MEQ: 20 TABLET, EXTENDED RELEASE ORAL at 16:29

## 2022-04-06 RX ADMIN — CEFAZOLIN SCH MLS/HR: 330 INJECTION, POWDER, FOR SOLUTION INTRAMUSCULAR; INTRAVENOUS at 20:00

## 2022-04-06 RX ADMIN — METOPROLOL TARTRATE SCH MG: 50 TABLET, FILM COATED ORAL at 09:09

## 2022-04-06 RX ADMIN — CEFAZOLIN SCH MLS/HR: 330 INJECTION, POWDER, FOR SOLUTION INTRAMUSCULAR; INTRAVENOUS at 13:10
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
## 2022-04-06 NOTE — P.PN
Subjective


Progress Note Date: 04/06/22








Roshan Mir, is a 71-year-old male who presented to [facility] emergency room via EMS, in poor condition apparently patient has been 

drinking at home until a few days ago when he fell to the floor and was unable 

to stand due to severe pain in bilateral lower extremities, he stated that he 

has been crawling around at his home for few days, it is not clear who called 

EMS, patient was found on the floor in his home, with his home being very cold, 

he was confused, she was brought into emergency room for further evaluation and 

treatment.


Patient was evaluated in the emergency room, he was a very poor historian and 

unable to contribute to the history of the last few days, he was complaining of 

bilateral feet pain, otherwise he denied any complaints at this time. vital 

examination on presentation revealed a temperature of 96.8 pulse 124 respiration

22 blood pressure 125/104 pulse ox 97% on room air.


Laboratory data revealed a white blood count of 10.2 hemoglobin 16.3 platelet 

count 184 sodium 136 potassium 4.8 chloride 100 CO2 22 BUN 35 creatinine 0.91 

lactic acid 1.6 AST 02/20/1980 LT 99 total bilirubin 1.4 creatinine kinase 3852 

troponin 0.033 alcohol level less than 10 urine analysis, urine tox screen, uric

acid level were ordered but are not available yet.


Testing in the emergency room revealed, EKG revealed atrial fibrillation with 

rapid ventricular response, computed tomography scan of the head and neck 

without contrast was done in the emergency room and did not reveal any evidence 

of intracranial hemorrhage or cervical spine fracture, chest x-ray was done in 

the emergency room and revealed asymmetric elevation in the right hemidiaphragm 

unchanged from previous exam in October 2021, patient also had a trace left 

pleural effusion.


Patient was started on IV Cardizem in the emergency room , he was admitted to 

telemetry floor , cardiology consultation was requested , in regard to atrial 

fibrillation with rapid ventricular response .








On 04/05/2021 patient was seen and examined on the medical floor he is alert and

oriented 3 in no apparent distress he is still complaining of pain in the 

bilateral lower extremities otherwise he denies any complaints, there is no 

fever or chills no headache or dizziness no chest pain no shortness of breath no

cough no nausea or vomiting no abdominal pain no diarrhea no blood in the stools

no burning with urination no frequency or urgency and no hematuria





On 04/06/2022 patient is alert and oriented 3.  Patient complains of pain and 

lack of sleeping.  Patient denies chest pain or shortness breath.  Patient den

ies nausea vomiting or diarrhea.  Patient denies any urinary burning or 

frequency patient was evaluated by vascular surgery.  Per vascular surgery no 

peripheral or arterial disease noted no indication for any vascular surgical 

intervention at this time continue to monitor prostate toes will need follow-up 

with vascular surgery outpatient.  Patient will likely require ECF facility upon

discharge per PT/OT recommendations





Objective





- Vital Signs


Vital signs: 


                                   Vital Signs











Temp  98.9 F   04/06/22 11:26


 


Pulse  71   04/06/22 11:26


 


Resp  17   04/06/22 11:26


 


BP  137/67   04/06/22 11:26


 


Pulse Ox  95   04/06/22 11:26








                                 Intake & Output











 04/05/22 04/06/22 04/06/22





 18:59 06:59 18:59


 


Intake Total   240


 


Output Total 350 600 


 


Balance -350 -600 240


 


Weight  86.1 kg 


 


Intake:   


 


  Oral   240


 


Output:   


 


  Urine 350 600 


 


Other:   


 


  Voiding Method Incontinent Incontinent External Catheter


 


  # Voids 1  














- Exam








In general patient is alert, slightly confused, in no distress


HEENT head normocephalic and atraumatic


Neck is supple no JVD no goiter no lymphadenopathy no carotid bruit


Chest examination is clear to auscultation no crackles no wheezing


Cardiac exam reveals irregular heart sounds S1 and S2 no gallops no murmurs, 

with tachycardia


Abdomen is soft nontender no organomegaly with normal bowel sounds


Extremity exam reveals no edema, there is reddish discoloration involving the 

right foot and ankle with multiple small ulcerations, the left lower extremity 

reveals left erythema but also is tender and has multiple small ulcers


Neurological examination reveals no gross focal deficit patient is moving all 4 

extremities





- Labs


CBC & Chem 7: 


                                 04/06/22 05:42





                                 04/06/22 05:42


Labs: 


                  Abnormal Lab Results - Last 24 Hours (Table)











  04/06/22 04/06/22 04/06/22 Range/Units





  05:42 05:42 05:45 


 


RBC  4.20 L    (4.30-5.90)  m/uL


 


Hgb  12.7 L    (13.0-17.5)  gm/dL


 


Plt Count  118 L    (150-450)  k/uL


 


Lymphocytes #  0.8 L    (1.0-4.8)  k/uL


 


Potassium   3.3 L   (3.5-5.1)  mmol/L


 


Calcium   7.6 L   (8.4-10.2)  mg/dL


 


AST   77 H   (17-59)  U/L


 


ALT   55 H   (4-49)  U/L


 


Creatine Kinase   588 H   ()  U/L


 


Total Protein   5.3 L   (6.3-8.2)  g/dL


 


Albumin   2.7 L   (3.5-5.0)  g/dL


 


Urine Protein    Trace H  (Negative)  


 


Urine Ketones    1+ H  (Negative)  














Assessment and Plan


Plan: 








1.  History of excessive alcohol use, with early alcohol withdrawal, patient was

started on CIWA protocol in the emergency room, and admitted to telemetry floor.





2.  Atrial fibrillation with rapid ventricular response, patient was started on 

IV Cardizem in the emergency room, at this time I will add subcu Lovenox at full

therapeutic dose.





3.  Bilateral lower extremity cellulitis, right worse than the left, with 

multiple small ulcerations, there is a possibility of peripheral vascular 

disease, patient is started on subcu Lovenox, he will be started on IV 

antibiotics, vascular consultation will be requested.





4.  Evidence of dehydration with acute kidney injury, with elevated BUN 35, 

patient was started on IV fluid in the emergency room





5.  Evidence of rhabdomyolysis, with elevated creatinine kinase at 3852, patient

was started on IV fluid Will monitor labs closely.





6.  Elevated liver enzymes likely related to acute alcoholic hepatitis, will 

monitor liver enzymes closely





7.  Underlying history of gout, uric acid level was ordered and is still pending

at this time





8.  Underlying history of hypertension





9.  Underlying history of hyperlipidemia





10.  Underlying history of stroke





11.  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





12.  Previous history of GI bleed, will monitor hemoglobin closely





13.  Previous history of subdural hematoma





14.  Noncompliance with medical therapy, patient was last admitted to the 

hospital in October 2021, he had multiple medications including blood pressure 

medications, at this time he stated that he does not take any medications at 

home, he did not follow-up with any physician after his discharge last time.








At this time patient is admitted to telemetry floor


Continue IV Cardizem


Continue CIWA protocol


Add IV cefazolin


Add subcu Lovenox at therapeutic dose and monitor CBC closely


Will follow closely prognosis is guarded

## 2022-04-06 NOTE — P.PN
Subjective


This is a 71-year-old male with a past medical history of excessive alcohol use,

hypertension, TIA, pancreatitis. He does not follow with a cardiologist, did not

follow up with Dr. Russo after admission in 10/2021. Cardiology was consulted for

atrial fibrillation. Patient presents to the emergency department with bilateral

feet and toe ulcers, pain difficulty walking. He states he was at home, he was 

crawling on the ground, he states his friend called EMS. He states he has had 

these ulcers on his toes for years. He denies any chest pain, palpitations, 

lightheadedness, dizziness, syncope or near syncope. No history of CAD, MI, 

Diabetes. He denies history of bleeding or frequent falls. But has fallen due to

excessive alcohol use. 


He states he could not get to the office to follow up for stress test due to 

ride issues. 





DIAGNOSTICS


-EKG reveals atrial fibrillation with ventricular response, heart rate 134, PVC.


-CT head with no acute intracranial process


-Chest xray trace left pleural effusion, unchanged asymmetric elevation of the 

right hemidiaphragm


-Venous Dopplers negative for DVT bilaterally





4/6/2022


Patient seen and examined at bedside, no acute distress.  He complains of not 

getting enough sleep in the hospital.  Denies any chest pain or shortness of 

breath.  Denies any lightheadedness or dizziness.  Telemetry reviewed patient 

had an episode of 5 beat run of NSVT, in atrial fibrillation with RVR overnight 

and converted to sinus mechanism.  Currently he is maintaining sinus mechanism 

with heart rate 70s


Echocardiogram reveals an EF of 5560%, no significant wall motion 

abnormalities.





Sodium 136, potassium 3.6, BUN 7, serum creatinine 0.4, triglycerides 144, 

cholesterol 172, LDL 71, HDL 71





PHYSICAL EXAMINATION


Vitals reviewed 


CONSTITUTIONAL: No apparent distress. 


HEENT: Neck supple.  No JVD. 


CHEST EXAMINATION: Lungs are clear to auscultation. No chest wall tenderness is 

noted on palpation or with deep breathing. 


HEART EXAMINATION: Irregular rate and rhythm. S1, S2 heard. No murmurs, gallops 

or rub.


ABDOMEN: Soft, nontender. Positive bowel sounds.


EXTREMITIES: 2+ peripheral pulses, moderate bilateral below the knee lower 

extremity edema and no calf tenderness.


SKIN: warm, moist, redness to bilateral lower feet and shins 


NEUROLOGIC EXAMINATION: Patient is awake, alert and oriented x3. 





ASSESSMENT


New onset paroxysmal atrial fibrillation -[id number] score 3, currently in 

sinus mechanism 


Alcohol abuse


Bilateral lower extremity redness, with ulcerations, cellulitis 


Acute kidney injury


Rhabdomyolysis 


Elevated liver enzymes 


Hypertension


History TIA 


History of subdural hematoma


History pancreatitis 


Noncompliance 


Prior Falls at home 





PLAN


Continue metoprolol tartrate 50mg BID


Patient does require long term anticoagulation, however, at this time due to 

alcohol abuse, elevated liver enzymes, frequent falls, subdural hematoma risk of

bleed due to anticoagulation is high. Continue Lovenox at this time per primary.




Continue cardiac telemetry


From a cardiology perspective, no further changes at this time. Recommend close 

follow up outpatient with Dr. Russo. 





Nurse practitioner note has been reviewed by physician. Signing provider agrees 

with the documented findings, assessment, and plan of care. 











Objective





- Vital Signs


Vital signs: 


                                   Vital Signs











Temp  98.9 F   04/06/22 11:26


 


Pulse  71   04/06/22 11:26


 


Resp  17   04/06/22 11:26


 


BP  137/67   04/06/22 11:26


 


Pulse Ox  95   04/06/22 11:26








                                 Intake & Output











 04/05/22 04/06/22 04/06/22





 18:59 06:59 18:59


 


Intake Total   240


 


Output Total 350 600 


 


Balance -350 -600 240


 


Weight  86.1 kg 


 


Intake:   


 


  Oral   240


 


Output:   


 


  Urine 350 600 


 


Other:   


 


  Voiding Method Incontinent Incontinent External Catheter


 


  # Voids 1  














- Labs


CBC & Chem 7: 


                                 04/06/22 05:42





                                 04/06/22 05:42


Labs: 


                  Abnormal Lab Results - Last 24 Hours (Table)











  04/06/22 04/06/22 04/06/22 Range/Units





  05:42 05:42 05:45 


 


RBC  4.20 L    (4.30-5.90)  m/uL


 


Hgb  12.7 L    (13.0-17.5)  gm/dL


 


Plt Count  118 L    (150-450)  k/uL


 


Lymphocytes #  0.8 L    (1.0-4.8)  k/uL


 


Potassium   3.3 L   (3.5-5.1)  mmol/L


 


Calcium   7.6 L   (8.4-10.2)  mg/dL


 


AST   77 H   (17-59)  U/L


 


ALT   55 H   (4-49)  U/L


 


Creatine Kinase   588 H   ()  U/L


 


Total Protein   5.3 L   (6.3-8.2)  g/dL


 


Albumin   2.7 L   (3.5-5.0)  g/dL


 


Urine Protein    Trace H  (Negative)  


 


Urine Ketones    1+ H  (Negative)

## 2022-04-06 NOTE — P.PN
Subjective


Progress Note Date: 04/06/22





Patient is seen in follow-up lying in bed.  He denies any acute changes through 

the night.  He has been afebrile.  States pain in toes has improved some.  He's 

been afebrile.  WBC 4.5 hemoglobin 12.7.  He remains on Kefzol.





Objective





- Vital Signs


Vital signs: 


                                   Vital Signs











Temp  98.6 F   04/06/22 08:00


 


Pulse  104 H  04/06/22 08:00


 


Resp  18   04/06/22 08:00


 


BP  136/65   04/06/22 08:00


 


Pulse Ox  99   04/06/22 08:00








                                 Intake & Output











 04/05/22 04/06/22 04/06/22





 18:59 06:59 18:59


 


Intake Total   240


 


Output Total 350 600 


 


Balance -350 -600 240


 


Weight  86.1 kg 


 


Intake:   


 


  Oral   240


 


Output:   


 


  Urine 350 600 


 


Other:   


 


  Voiding Method Incontinent Incontinent External Catheter


 


  # Voids 1  














- Exam





General appearance: The patient is alert, oriented, appears in no acute 

distress.


HET: Head is normocephalic and atraumatic.  Pupils are equal and reactive.  


Neck: Supple without lymphadenopathy.  Trachea midline.  No audible carotid 

bruit.


Heart: S1 S2.  Regular rate and rhythm.


Lungs: Clear to auscultation bilaterally.


Abdomen: Soft, nontender, nondistended.


Extremities: Normal skin color and turgor bilateral lower extremities also warm 

to the touch other than bilateral toes with some discoloration, purple, 

blisters, bilateral fifth toes with blisters and weeping clear fluid.  Very 

tender to the touch.  Palpable +2 dorsalis pedis and posterior tibialis pulses.


Neurological: No focal deficits.  Strength and sensation are grossly intact.








- Labs


CBC & Chem 7: 


                                 04/06/22 05:42





                                 04/06/22 05:42


Labs: 


                  Abnormal Lab Results - Last 24 Hours (Table)











  04/06/22 04/06/22 04/06/22 Range/Units





  05:42 05:42 05:45 


 


RBC  4.20 L    (4.30-5.90)  m/uL


 


Hgb  12.7 L    (13.0-17.5)  gm/dL


 


Plt Count  118 L    (150-450)  k/uL


 


Lymphocytes #  0.8 L    (1.0-4.8)  k/uL


 


Potassium   3.3 L   (3.5-5.1)  mmol/L


 


Calcium   7.6 L   (8.4-10.2)  mg/dL


 


AST   77 H   (17-59)  U/L


 


ALT   55 H   (4-49)  U/L


 


Creatine Kinase   588 H   ()  U/L


 


Total Protein   5.3 L   (6.3-8.2)  g/dL


 


Albumin   2.7 L   (3.5-5.0)  g/dL


 


Urine Protein    Trace H  (Negative)  


 


Urine Ketones    1+ H  (Negative)  














Assessment and Plan


Assessment: 





1.  Frostbite to bilateral toes


2.  Fall at home


3.  Rhabdomyolysis


4.  History of alcohol abuse





Plan: 





1.  Apply Opticell silver to left 5th toe


2.  Continue symptomatic and supportive care


3.  Recommend alcohol abstinence


4.  There is no peripheral arterial disease noted, has +2 palpable DP and PT 

pulses.  No indication for any vascular surgical intervention at this time


5.  Continue to monitor frostbite toes, allowed to demarcate.  Will need to 

follow-up with vascular surgery outpatient.


6.  Outpatient wound clinic


Thank you for this consultation, we will continue to follow.





The impression and plan of care has been dictated as directed.





Dr. Garcia


I performed a history and examination of this patient,  discussed the same with 

the dictator.  I agree with the dictator's note ,documented as a scribe.  Any 

additional findings or plans will be noted.

## 2022-04-07 LAB
ALBUMIN SERPL-MCNC: 2.7 G/DL (ref 3.5–5)
ALP SERPL-CCNC: 55 U/L (ref 38–126)
ALT SERPL-CCNC: 37 U/L (ref 4–49)
ANION GAP SERPL CALC-SCNC: 3 MMOL/L
AST SERPL-CCNC: 54 U/L (ref 17–59)
BASOPHILS # BLD AUTO: 0 K/UL (ref 0–0.2)
BASOPHILS NFR BLD AUTO: 1 %
BUN SERPL-SCNC: 13 MG/DL (ref 9–20)
CALCIUM SPEC-MCNC: 7.7 MG/DL (ref 8.4–10.2)
CHLORIDE SERPL-SCNC: 108 MMOL/L (ref 98–107)
CO2 SERPL-SCNC: 27 MMOL/L (ref 22–30)
EOSINOPHIL # BLD AUTO: 0.1 K/UL (ref 0–0.7)
EOSINOPHIL NFR BLD AUTO: 4 %
ERYTHROCYTE [DISTWIDTH] IN BLOOD BY AUTOMATED COUNT: 3.99 M/UL (ref 4.3–5.9)
ERYTHROCYTE [DISTWIDTH] IN BLOOD: 15.2 % (ref 11.5–15.5)
GLUCOSE SERPL-MCNC: 100 MG/DL (ref 74–99)
HCT VFR BLD AUTO: 38 % (ref 39–53)
HGB BLD-MCNC: 12.7 GM/DL (ref 13–17.5)
LYMPHOCYTES # SPEC AUTO: 1 K/UL (ref 1–4.8)
LYMPHOCYTES NFR SPEC AUTO: 24 %
MCH RBC QN AUTO: 31.8 PG (ref 25–35)
MCHC RBC AUTO-ENTMCNC: 33.3 G/DL (ref 31–37)
MCV RBC AUTO: 95.4 FL (ref 80–100)
MONOCYTES # BLD AUTO: 0.3 K/UL (ref 0–1)
MONOCYTES NFR BLD AUTO: 7 %
NEUTROPHILS # BLD AUTO: 2.5 K/UL (ref 1.3–7.7)
NEUTROPHILS NFR BLD AUTO: 61 %
PLATELET # BLD AUTO: 145 K/UL (ref 150–450)
POTASSIUM SERPL-SCNC: 3.8 MMOL/L (ref 3.5–5.1)
PROT SERPL-MCNC: 5.2 G/DL (ref 6.3–8.2)
SODIUM SERPL-SCNC: 138 MMOL/L (ref 137–145)
WBC # BLD AUTO: 4 K/UL (ref 3.8–10.6)

## 2022-04-07 RX ADMIN — Medication SCH MG: at 06:28

## 2022-04-07 RX ADMIN — HYDROCODONE BITARTRATE AND ACETAMINOPHEN PRN EACH: 5; 325 TABLET ORAL at 03:19

## 2022-04-07 RX ADMIN — HYDROCODONE BITARTRATE AND ACETAMINOPHEN PRN EACH: 5; 325 TABLET ORAL at 22:59

## 2022-04-07 RX ADMIN — ENOXAPARIN SODIUM SCH MG: 40 INJECTION SUBCUTANEOUS at 10:59

## 2022-04-07 RX ADMIN — CEFAZOLIN SCH MLS/HR: 330 INJECTION, POWDER, FOR SOLUTION INTRAMUSCULAR; INTRAVENOUS at 21:03

## 2022-04-07 RX ADMIN — CEFAZOLIN SCH MLS/HR: 330 INJECTION, POWDER, FOR SOLUTION INTRAMUSCULAR; INTRAVENOUS at 14:08

## 2022-04-07 RX ADMIN — CEFAZOLIN SCH MLS/HR: 330 INJECTION, POWDER, FOR SOLUTION INTRAMUSCULAR; INTRAVENOUS at 06:28

## 2022-04-07 RX ADMIN — HYDROCODONE BITARTRATE AND ACETAMINOPHEN PRN EACH: 5; 325 TABLET ORAL at 11:00

## 2022-04-07 RX ADMIN — HYDROCODONE BITARTRATE AND ACETAMINOPHEN PRN EACH: 5; 325 TABLET ORAL at 17:43

## 2022-04-07 RX ADMIN — Medication SCH MG: at 11:00

## 2022-04-07 RX ADMIN — METOPROLOL TARTRATE SCH MG: 50 TABLET, FILM COATED ORAL at 21:03

## 2022-04-07 RX ADMIN — METOPROLOL TARTRATE SCH MG: 50 TABLET, FILM COATED ORAL at 11:00

## 2022-04-07 NOTE — P.PN
Subjective


This is a 71-year-old male with a past medical history of excessive alcohol use,

hypertension, TIA, pancreatitis. He does not follow with a cardiologist, did not

follow up with Dr. Russo after admission in 10/2021. Cardiology was consulted for

atrial fibrillation. Patient presents to the emergency department with bilateral

feet and toe ulcers, pain difficulty walking. He states he was at home, he was 

crawling on the ground, he states his friend called EMS. He states he has had 

these ulcers on his toes for years. He denies any chest pain, palpitations, 

lightheadedness, dizziness, syncope or near syncope. No history of CAD, MI, 

Diabetes. He denies history of bleeding or frequent falls. But has fallen due to

excessive alcohol use. 


He states he could not get to the office to follow up for stress test due to 

ride issues. 





DIAGNOSTICS


-EKG reveals atrial fibrillation with ventricular response, heart rate 134, PVC.


-CT head with no acute intracranial process


-Chest xray trace left pleural effusion, unchanged asymmetric elevation of the 

right hemidiaphragm


-Venous Dopplers negative for DVT bilaterally





4/7/2022


Patient seen and examined at bedside, no acute distress.  Denies any chest pain 

or shortness of breath.  Denies any lightheadedness or dizziness.  Telemetry 

reviewed maintaining sinus mechanism, with one episode of 9 beat NSVT.  

Currently he is maintaining sinus mechanism with heart rate 70s-80s


Echocardiogram reveals an EF of 5560%, no significant wall motion 

abnormalities.





Sodium 138, potassium 3.8, BUN 13, serum creatinine 0.75





PHYSICAL EXAMINATION


Vitals reviewed 


CONSTITUTIONAL: No apparent distress. 


HEENT: Neck supple.  No JVD. 


CHEST EXAMINATION: Lungs are clear to auscultation. No chest wall tenderness is 

noted on palpation or with deep breathing. 


HEART EXAMINATION: Irregular rate and rhythm. S1, S2 heard. No murmurs, gallops 

or rub.


ABDOMEN: Soft, nontender. Positive bowel sounds.


EXTREMITIES: 2+ peripheral pulses, moderate bilateral below the knee lower 

extremity edema and no calf tenderness.


SKIN: warm, moist, redness to bilateral lower feet and shins 


NEUROLOGIC EXAMINATION: Patient is awake, alert and oriented x3. 





ASSESSMENT


New onset paroxysmal atrial fibrillation -[id number] score 3, currently in 

sinus mechanism 


Alcohol abuse


Bilateral lower extremity redness, with ulcerations, cellulitis 


Acute kidney injury


Rhabdomyolysis 


Elevated liver enzymes 


Hypertension


History TIA 


History of subdural hematoma


History pancreatitis 


Noncompliance 


Prior Falls at home 





PLAN


Continue metoprolol tartrate 50mg BID


Patient does require long term anticoagulation. We discussed anticoagulation, 

atrial fibrillation, risk of stroke and bleeding with the patient. Patient 

states he is worried about falling and not wanting anticoagulation at this time.

We will not start anticoagulation. Continue Lovenox at this time per primary. 


From a cardiology perspective, no further changes at this time. Recommend close 

follow up outpatient with Dr. Russo. We will follow the patient as needed. Please

reach out with any further questions or concerns. 





Nurse practitioner note has been reviewed by physician. Signing provider agrees 

with the documented findings, assessment, and plan of care. 











Objective





- Vital Signs


Vital signs: 


                                   Vital Signs











Temp  99.1 F   04/07/22 09:17


 


Pulse  87   04/07/22 09:17


 


Resp  16   04/07/22 09:17


 


BP  138/67   04/07/22 09:17


 


Pulse Ox  94 L  04/07/22 09:17








                                 Intake & Output











 04/06/22 04/07/22 04/07/22





 18:59 06:59 18:59


 


Intake Total 240  480


 


Output Total 450 400 600


 


Balance -210 -400 -120


 


Intake:   


 


  Oral 240  480


 


Output:   


 


  Urine 450 400 600


 


Other:   


 


  Voiding Method External Catheter External Catheter External Catheter














- Labs


CBC & Chem 7: 


                                 04/07/22 08:07





                                 04/07/22 08:07


Labs: 


                  Abnormal Lab Results - Last 24 Hours (Table)











  04/07/22 04/07/22 Range/Units





  08:07 08:07 


 


RBC  3.99 L   (4.30-5.90)  m/uL


 


Hgb  12.7 L   (13.0-17.5)  gm/dL


 


Hct  38.0 L   (39.0-53.0)  %


 


Plt Count  145 L   (150-450)  k/uL


 


Chloride   108 H  ()  mmol/L


 


Glucose   100 H  (74-99)  mg/dL


 


Calcium   7.7 L  (8.4-10.2)  mg/dL


 


Total Protein   5.2 L  (6.3-8.2)  g/dL


 


Albumin   2.7 L  (3.5-5.0)  g/dL

## 2022-04-07 NOTE — P.PN
Subjective


Progress Note Date: 04/07/22





Patient is seen in follow-up lying in bed.  He denies any acute changes through 

the night.  He has been afebrile.  States pain in toes has improved some.  He's 

been afebrile.   Infectious disease was consulted yesterday for antibiotic 

therapy, they increased to 2 g every 8 hours.





Objective





- Vital Signs


Vital signs: 


                                   Vital Signs











Temp  99.1 F   04/07/22 09:17


 


Pulse  87   04/07/22 09:17


 


Resp  16   04/07/22 09:17


 


BP  138/67   04/07/22 09:17


 


Pulse Ox  94 L  04/07/22 09:17








                                 Intake & Output











 04/06/22 04/07/22 04/07/22





 18:59 06:59 18:59


 


Intake Total 240  480


 


Output Total 450 400 600


 


Balance -210 -400 -120


 


Intake:   


 


  Oral 240  480


 


Output:   


 


  Urine 450 400 600


 


Other:   


 


  Voiding Method External Catheter External Catheter External Catheter














- Exam





General appearance: The patient is alert, oriented, appears in no acute 

distress.


HET: Head is normocephalic and atraumatic.  Pupils are equal and reactive.  


Neck: Supple without lymphadenopathy.  Trachea midline.  No audible carotid 

bruit.


Heart: S1 S2.  Regular rate and rhythm.


Lungs: Clear to auscultation bilaterally.


Abdomen: Soft, nontender, nondistended.


Extremities: Normal skin color and turgor bilateral lower extremities also warm 

to the touch other than bilateral toes with some discoloration, purple, blist

ers, bilateral fifth toes with blisters and weeping clear fluid.  Very tender to

the touch.  Palpable +2 dorsalis pedis and posterior tibialis pulses.


Neurological: No focal deficits.  Strength and sensation are grossly intact.








- Labs


CBC & Chem 7: 


                                 04/07/22 08:07





                                 04/07/22 08:07


Labs: 


                  Abnormal Lab Results - Last 24 Hours (Table)











  04/07/22 04/07/22 Range/Units





  08:07 08:07 


 


RBC  3.99 L   (4.30-5.90)  m/uL


 


Hgb  12.7 L   (13.0-17.5)  gm/dL


 


Hct  38.0 L   (39.0-53.0)  %


 


Plt Count  145 L   (150-450)  k/uL


 


Chloride   108 H  ()  mmol/L


 


Glucose   100 H  (74-99)  mg/dL


 


Calcium   7.7 L  (8.4-10.2)  mg/dL


 


Total Protein   5.2 L  (6.3-8.2)  g/dL


 


Albumin   2.7 L  (3.5-5.0)  g/dL














Assessment and Plan


Assessment: 





1.  Frostbite to bilateral toes


2.  Fall at home


3.  Rhabdomyolysis


4.  History of alcohol abuse





Plan: 





1.  Apply Opticell silver to bilateral toes


2.  Continue symptomatic and supportive care


3.  Recommend alcohol abstinence


4.  There is no peripheral arterial disease noted, has +2 palpable DP and PT 

pulses.  No indication for any vascular surgical intervention at this time


5.  Continue to monitor frostbite toes, allow to demarcate.  Will need to 

follow-up with vascular surgery outpatient.


6.  Outpatient wound clinic


7.  Patient cleared for discharge from vascular surgery once otherwise medically

stable.


Thank you for this consultation, we will continue to follow.





The impression and plan of care has been dictated as directed.





Dr. Le


I performed a history and examination of this patient,  discussed the same with 

the dictator.  I agree with the dictator's note ,documented as a scribe.  Any 

additional findings or plans will be noted.

## 2022-04-08 VITALS — SYSTOLIC BLOOD PRESSURE: 164 MMHG | DIASTOLIC BLOOD PRESSURE: 78 MMHG | HEART RATE: 61 BPM

## 2022-04-08 VITALS — RESPIRATION RATE: 18 BRPM | TEMPERATURE: 98.2 F

## 2022-04-08 RX ADMIN — ENOXAPARIN SODIUM SCH MG: 40 INJECTION SUBCUTANEOUS at 09:44

## 2022-04-08 RX ADMIN — Medication SCH MG: at 05:38

## 2022-04-08 RX ADMIN — METOPROLOL TARTRATE SCH MG: 50 TABLET, FILM COATED ORAL at 09:44

## 2022-04-08 RX ADMIN — CEFAZOLIN SCH MLS/HR: 330 INJECTION, POWDER, FOR SOLUTION INTRAMUSCULAR; INTRAVENOUS at 05:38

## 2022-04-08 RX ADMIN — HYDROCODONE BITARTRATE AND ACETAMINOPHEN PRN EACH: 5; 325 TABLET ORAL at 05:37

## 2022-04-08 NOTE — P.PN
Subjective


Progress Note Date: 04/08/22








Roshan Mir, is a 71-year-old male who presented to [facility] emergency room via EMS, in poor condition apparently patient has been 

drinking at home until a few days ago when he fell to the floor and was unable 

to stand due to severe pain in bilateral lower extremities, he stated that he 

has been crawling around at his home for few days, it is not clear who called 

EMS, patient was found on the floor in his home, with his home being very cold, 

he was confused, she was brought into emergency room for further evaluation and 

treatment.


Patient was evaluated in the emergency room, he was a very poor historian and 

unable to contribute to the history of the last few days, he was complaining of 

bilateral feet pain, otherwise he denied any complaints at this time. vital 

examination on presentation revealed a temperature of 96.8 pulse 124 respiration

22 blood pressure 125/104 pulse ox 97% on room air.


Laboratory data revealed a white blood count of 10.2 hemoglobin 16.3 platelet 

count 184 sodium 136 potassium 4.8 chloride 100 CO2 22 BUN 35 creatinine 0.91 

lactic acid 1.6 AST 02/20/1980 LT 99 total bilirubin 1.4 creatinine kinase 3852 

troponin 0.033 alcohol level less than 10 urine analysis, urine tox screen, uric

acid level were ordered but are not available yet.


Testing in the emergency room revealed, EKG revealed atrial fibrillation with 

rapid ventricular response, computed tomography scan of the head and neck 

without contrast was done in the emergency room and did not reveal any evidence 

of intracranial hemorrhage or cervical spine fracture, chest x-ray was done in 

the emergency room and revealed asymmetric elevation in the right hemidiaphragm 

unchanged from previous exam in October 2021, patient also had a trace left 

pleural effusion.


Patient was started on IV Cardizem in the emergency room , he was admitted to 

telemetry floor , cardiology consultation was requested , in regard to atrial 

fibrillation with rapid ventricular response .








On 04/05/2021 patient was seen and examined on the medical floor he is alert and

oriented 3 in no apparent distress he is still complaining of pain in the 

bilateral lower extremities otherwise he denies any complaints, there is no 

fever or chills no headache or dizziness no chest pain no shortness of breath no

cough no nausea or vomiting no abdominal pain no diarrhea no blood in the stools

no burning with urination no frequency or urgency and no hematuria





On 04/06/2022 patient is alert and oriented 3.  Patient complains of pain and 

lack of sleeping.  Patient denies chest pain or shortness breath.  Patient den

ies nausea vomiting or diarrhea.  Patient denies any urinary burning or 

frequency patient was evaluated by vascular surgery.  Per vascular surgery no 

peripheral or arterial disease noted no indication for any vascular surgical 

intervention at this time continue to monitor prostate toes will need follow-up 

with vascular surgery outpatient.  Patient will likely require ECF facility upon

discharge per PT/OT recommendations





On 04/08/2022 patient's alert and oriented 3.  Patient is resting comfortably 

in bed.  Per cardiology patient will not be started on anticoagulation.  Patient

remains on IV antibiotics for cellulitis.  Patient denies chest pain or 

shortness breath.  Patient denies nausea vomiting or diarrhea.  Patient denies 

any urinary burning and frequency





Objective





- Vital Signs


Vital signs: 


                                   Vital Signs











Temp  98.2 F   04/08/22 08:00


 


Pulse  76   04/08/22 08:00


 


Resp  18   04/08/22 08:00


 


BP  142/65   04/08/22 08:00


 


Pulse Ox  93 L  04/08/22 08:00








                                 Intake & Output











 04/07/22 04/08/22 04/08/22





 18:59 06:59 18:59


 


Intake Total 960 1220 360


 


Output Total 1100 1200 


 


Balance -140 20 360


 


Intake:   


 


  Oral 960 1220 360


 


Output:   


 


  Urine 1100 1200 


 


Other:   


 


  Voiding Method External Catheter External Catheter 


 


  # Bowel Movements 1  














- Exam








In general patient is alert, slightly confused, in no distress


HEENT head normocephalic and atraumatic


Neck is supple no JVD no goiter no lymphadenopathy no carotid bruit


Chest examination is clear to auscultation no crackles no wheezing


Cardiac exam reveals irregular heart sounds S1 and S2 no gallops no murmurs, 

with tachycardia


Abdomen is soft nontender no organomegaly with normal bowel sounds


Extremity exam reveals no edema, there is reddish discoloration involving the 

right foot and ankle with multiple small ulcerations, the left lower extremity 

reveals left erythema but also is tender and has multiple small ulcers


Neurological examination reveals no gross focal deficit patient is moving all 4 

extremities





- Labs


CBC & Chem 7: 


                                 04/07/22 08:07





                                 04/07/22 08:07





Assessment and Plan


Plan: 








1.  History of excessive alcohol use, with early alcohol withdrawal, patient was

started on CIWA protocol in the emergency room, and admitted to telemetry floor.





2.  Atrial fibrillation with rapid ventricular response, patient was started on 

IV Cardizem in the emergency room, at this time I will add subcu Lovenox at full

therapeutic dose.





3.  Bilateral lower extremity cellulitis, right worse than the left, with 

multiple small ulcerations, there is a possibility of peripheral vascular 

disease, patient is started on subcu Lovenox, he will be started on IV 

antibiotics, vascular consultation will be requested.





4.  Evidence of dehydration with acute kidney injury, with elevated BUN 35, 

patient was started on IV fluid in the emergency room





5.  Evidence of rhabdomyolysis, with elevated creatinine kinase at 3852, patient

was started on IV fluid Will monitor labs closely.





6.  Elevated liver enzymes likely related to acute alcoholic hepatitis, will 

monitor liver enzymes closely





7.  Underlying history of gout, uric acid level was ordered and is still pending

at this time





8.  Underlying history of hypertension





9.  Underlying history of hyperlipidemia





10.  Underlying history of stroke





11.  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





12.  Previous history of GI bleed, will monitor hemoglobin closely





13.  Previous history of subdural hematoma





14.  Noncompliance with medical therapy, patient was last admitted to the 

hospital in October 2021, he had multiple medications including blood pressure 

medications, at this time he stated that he does not take any medications at 

home, he did not follow-up with any physician after his discharge last time.








At this time patient is admitted to telemetry floor


Continue IV Cardizem


Continue CIWA protocol


Add IV cefazolin


Add subcu Lovenox at therapeutic dose and monitor CBC closely


Will follow closely prognosis is guarded

## 2022-04-08 NOTE — P.DS
Providers


Date of admission: 


04/04/22 15:52





Expected date of discharge: 04/08/22


Attending physician: 


Rajinder Foreman





Consults: 





                                        





04/04/22 16:19


Consult Physician Routine 


   Consulting Provider: [facility]


   Consult Reason/Comments: A. fib with RVR


   Do you want consulting provider notified?: Yes





04/04/22 19:51


Consult Physician Routine 


   Consulting Provider: Dinora Garcia


   Consult Reason/Comments: peripheral arterial disease


   Do you want consulting provider notified?: Yes





04/05/22 19:18


Consult Physician Routine 


   Consulting Provider: Haley Baez


   Consult Reason/Comments: lower etremity cellulitis


   Do you want consulting provider notified?: Yes











Primary care physician: 


Stated None





Hospital Course: 





Discharge diagnosis


1.  History of excessive alcohol use, with early alcohol withdrawal, patient was

started on CIWA protocol in the emergency room, and admitted to telemetry floor.





2.  Atrial fibrillation with rapid ventricular response, patient was started on 

IV Cardizem in the emergency room, .  Patient has been started on Lopressor.  

Per cardiology discussion was held with patient about anticoagulation was 

determined at this time patient will not be started on anticoagulation due to 

high risk of falls





3.  Bilateral lower extremity cellulitis, right worse than the left, with 

multiple small ulcerations, there is a possibility of peripheral vascular 

disease, patient is started on subcu Lovenox, he will be started on IV 

antibiotics, vascular consultation will be requested.





4.  Evidence of dehydration with acute kidney injury, with elevated BUN 35, 

patient was started on IV fluid in the emergency room





5.  Evidence of rhabdomyolysis, with elevated creatinine kinase at 3852, patient

was started on IV fluid Will monitor labs closely.





6.  Elevated liver enzymes likely related to acute alcoholic hepatitis, will 

monitor liver enzymes closely





7.  Underlying history of gout, uric acid level was ordered and is still pending

at this time





8.  Underlying history of hypertension





9.  Underlying history of hyperlipidemia





10.  Underlying history of stroke





11.  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





12.  Previous history of GI bleed, will monitor hemoglobin closely





13.  Previous history of subdural hematoma





14.  Noncompliance with medical therapy, patient was last admitted to the 

hospital in October 2021, he had multiple medications including blood pressure 

medications, at this time he stated that he does not take any medications at 

home, he did not follow-up with any physician after his discharge last time.





Hospital course





Roshan Mir, is a 71-year-old male who presented to [facility] emergency room via EMS, in poor condition apparently patient has been 

drinking at home until a few days ago when he fell to the floor and was unable 

to stand due to severe pain in bilateral lower extremities, he stated that he 

has been crawling around at his home for few days, it is not clear who called 

EMS, patient was found on the floor in his home, with his home being very cold, 

he was confused, she was brought into emergency room for further evaluation and 

treatment.


Patient was evaluated in the emergency room, he was a very poor historian and 

unable to contribute to the history of the last few days, he was complaining of 

bilateral feet pain, otherwise he denied any complaints at this time. vital 

examination on presentation revealed a temperature of 96.8 pulse 124 respiration

22 blood pressure 125/104 pulse ox 97% on room air.


Laboratory data revealed a white blood count of 10.2 hemoglobin 16.3 platelet 

count 184 sodium 136 potassium 4.8 chloride 100 CO2 22 BUN 35 creatinine 0.91 

lactic acid 1.6 AST 02/20/1980 LT 99 total bilirubin 1.4 creatinine kinase 3852 

troponin 0.033 alcohol level less than 10 urine analysis, urine tox screen, uric

acid level were ordered but are not available yet.


Testing in the emergency room revealed, EKG revealed atrial fibrillation with 

rapid ventricular response, computed tomography scan of the head and neck 

without contrast was done in the emergency room and did not reveal any evidence 

of intracranial hemorrhage or cervical spine fracture, chest x-ray was done in 

the emergency room and revealed asymmetric elevation in the right hemidiaphragm 

unchanged from previous exam in October 2021, patient also had a trace left 

pleural effusion.


Patient was started on IV Cardizem in the emergency room , he was admitted to 

telemetry floor , cardiology consultation was requested , in regard to atrial 

fibrillation with rapid ventricular response .








On 04/05/2021 patient was seen and examined on the medical floor he is alert and

oriented 3 in no apparent distress he is still complaining of pain in the 

bilateral lower extremities otherwise he denies any complaints, there is no 

fever or chills no headache or dizziness no chest pain no shortness of breath no

cough no nausea or vomiting no abdominal pain no diarrhea no blood in the stools

no burning with urination no frequency or urgency and no hematuria





On 04/06/2022 patient is alert and oriented 3.  Patient complains of pain and 

lack of sleeping.  Patient denies chest pain or shortness breath.  Patient 

denies nausea vomiting or diarrhea.  Patient denies any urinary burning or 

frequency patient was evaluated by vascular surgery.  Per vascular surgery no 

peripheral or arterial disease noted no indication for any vascular surgical 

intervention at this time continue to monitor prostate toes will need follow-up 

with vascular surgery outpatient.  Patient will likely require ECF facility upon

discharge per PT/OT recommendations





On 04/08/2022 patient's alert and oriented 3.  Patient is resting comfortably 

in bed.  Per cardiology patient will not be started on anticoagulation.  Patient

remains on IV antibiotics for cellulitis.  Patient denies chest pain or 

shortness breath.  Patient denies nausea vomiting or diarrhea.  Patient denies 

any urinary burning and frequency





Discussed case with  patient will be DC'd ECF facility.  Patient 

will go on Keflex.  Patient also will go on Ativan when necessary as needed.  

Per cardiology patient will not be started on anticoagulation at this time due 

to high risk of falls


Patient Condition at Discharge: Stable





Plan - Discharge Summary


Discharge Rx Participant: No


New Discharge Prescriptions: 


New


   Cephalexin [Keflex] 500 mg PO Q8HR 5 Days #15 cap


   Metoprolol Tartrate [Lopressor] 50 mg PO BID 30 Days #60 tab


   Thiamine [Vitamin B-1] 100 mg PO BID-W/MEALS 30 Days #60 tab


Discharge Medication List





Cephalexin [Keflex] 500 mg PO Q8HR 5 Days #15 cap 04/08/22 [Rx]


Metoprolol Tartrate [Lopressor] 50 mg PO BID 30 Days #60 tab 04/08/22 [Rx]


Thiamine [Vitamin B-1] 100 mg PO BID-W/MEALS 30 Days #60 tab 04/08/22 [Rx]








Follow up Appointment(s)/Referral(s): 


Jeremiah Russo MD [STAFF PHYSICIAN] - 2 Weeks


Kris Nolasco DO [STAFF PHYSICIAN] - 2 Weeks


None,Stated [Primary Care Provider] - 1-2 days


[facility] [NON-STAFF] - 1 Week


Activity/Diet/Wound Care/Special Instructions: 


Activity as tolerated





Diet heart healthy

## 2022-04-08 NOTE — P.PN
Subjective


Progress Note Date: 04/08/22





Patient is seen in follow-up lying in bed.  No acute changes through the night. 

States that pain has improved in his toes and feet, however states left fifth 

toe is still painful.  He has a dressing in place.  Cardiology has been 

following patient recommends anticoagulation however patient is declining as he 

is at high risk for falls.  He is afebrile.





Objective





- Vital Signs


Vital signs: 


                                   Vital Signs











Temp  98.5 F   04/07/22 20:00


 


Pulse  81   04/08/22 04:00


 


Resp  16   04/08/22 04:00


 


BP  165/75   04/08/22 04:00


 


Pulse Ox  95   04/08/22 04:00








                                 Intake & Output











 04/07/22 04/08/22 04/08/22





 18:59 06:59 18:59


 


Intake Total 960 1220 360


 


Output Total 1100 1200 


 


Balance -140 20 360


 


Intake:   


 


  Oral 960 1220 360


 


Output:   


 


  Urine 1100 1200 


 


Other:   


 


  Voiding Method External Catheter External Catheter 


 


  # Bowel Movements 1  














- Exam





General appearance: The patient is alert, oriented, appears in no acute 

distress.


HET: Head is normocephalic and atraumatic.  Pupils are equal and reactive.  


Neck: Supple without lymphadenopathy.  Trachea midline.  No audible carotid b

ruit.


Heart: S1 S2.  Regular rate and rhythm.


Lungs: Clear to auscultation bilaterally.


Abdomen: Soft, nontender, nondistended.


Extremities: Normal skin color and turgor bilateral lower extremities also warm 

to the touch other than bilateral toes with purple blisters, bilateral fifth 

toes with blisters and weeping clear fluid. Palpable +2 dorsalis pedis and 

posterior tibialis pulses.


Neurological: No focal deficits.  Strength and sensation are grossly intact.








- Labs


CBC & Chem 7: 


                                 04/07/22 08:07





                                 04/07/22 08:07





Assessment and Plan


Assessment: 





1.  Frostbite to bilateral toes


2.  Fall at home


3.  Rhabdomyolysis


4.  History of alcohol abuse





Plan: 





1.  Apply Opticell silver to bilateral 5th toes


2.  Continue symptomatic and supportive care


3.  Recommend alcohol abstinence


4.  There is no peripheral arterial disease noted, has +2 palpable DP and PT 

pulses.  No indication for any vascular surgical intervention at this time


5.  Continue to monitor frostbite toes, allow to demarcate.  Will need to 

follow-up with vascular surgery outpatient.


6.  Outpatient wound clinic


7.  Patient cleared for discharge from vascular surgery once otherwise medically

stable.


Thank you for this consultation, we will sign off at this time.





The impression and plan of care has been dictated as directed.





Dr. Garcia


I performed a history and examination of this patient,  discussed the same with 

the dictator.  I agree with the dictator's note ,documented as a scribe.  Any 

additional findings or plans will be noted.

## 2022-04-11 NOTE — CDI
Documentation Clarification Form



Date: 04/11/2022 03:12:54 PM

From: Werner Shin

MRN: [id number]

Admit Date: 04/04/2022 03:52:00 PM

Patient Name: Roshan Mir

Visit Number: [id number]

Discharge Date:  04/08/2022 02:41:00 PM





ATTENTION: The Clinical Documentation Specialists (CDI) and [facility] Coding Staff 
appreciate your assistance in clarifying documentation. Please respond to the 
clarification below the line at the bottom and electronically sign. The CDI & 
[facility] Coding staff will review the response and follow-up if needed. Please note: 
Queries are made part of the Legal Health Record. If you have any questions, 
please contact the author of this message via ITS.



Dr. Rajinder Foreman



The patients principal diagnosis  the diagnosis that was chiefly responsible 
for the admission - has not been clearly identified and clarification is 
requested. 



The patient presented with the following: ETOH withdrawl, cellulitis, 
rhabdomyolosis and atrial fibrillation.



History/Risk factors: ETOH abuse, hx of falling, A-fib



Clinical Indicators:

Lab findings: rhabdomyolosis, dehydration

Radiology findings:

Vital Signs:



Treatment: IV Cardizem, ETOH withdrawl protocol, IV NaCl, IV antibiotics

Consults:



In your professional opinion, can you please clarify which diagnosis, after 
study, was the reason chiefly responsible for the admission?



[x ] ETOH withdrawl

[ x ] atrial fibrillation

[  ] Other, please specify ______

[  ] Unable to determine

[  ] bilateral LE cellultis

[  ] all of the above equally

___________________________________

MTDD

## 2022-04-15 NOTE — P.PN
Subjective


Progress Note Date: 04/07/22


Principal diagnosis: 


Cellulitis





Patient is a 71-year-old  male with multiple comorbidities and history 

of heavy drinking brought into the ER after pending the patient was on the floor

for 2-3 days noticed to have some discoloration of the toes along with the 

cellulitis to the left lateral foot area.


On today's evaluation that is 04/07/2022, patient denies having any fever or 

chills; complaining of  pain to the left foot no worsening no chest pain 

shortness of breath or cough no abdominal pain or diarrhea








Objective





- Vital Signs


Vital signs: 


                                   Vital Signs











Temp  98.3 F   04/07/22 13:00


 


Pulse  68   04/07/22 13:00


 


Resp  17   04/07/22 13:00


 


BP  135/71   04/07/22 13:00


 


Pulse Ox  96   04/07/22 13:00








                                 Intake & Output











 04/06/22 04/07/22 04/07/22





 18:59 06:59 18:59


 


Intake Total 240  480


 


Output Total 450 400 600


 


Balance -210 -400 -120


 


Intake:   


 


  Oral 240  480


 


Output:   


 


  Urine 450 400 600


 


Other:   


 


  Voiding Method External Catheter External Catheter External Catheter














- Exam


GENERAL DESCRIPTION: An elderly male lying in bed in no distress





RESPIRATORY SYSTEM: Unlabored breathing , decreased breath sounds at bases





HEART: S1 S2 regular rate and rhythm ,





ABDOMEN: Soft , no tenderness





EXTREMITIES: Left foot with some erythema and drainage on the dressing








- Labs


CBC & Chem 7: 


                                 04/07/22 08:07





                                 04/07/22 08:07


Labs: 


                  Abnormal Lab Results - Last 24 Hours (Table)











  04/07/22 04/07/22 Range/Units





  08:07 08:07 


 


RBC  3.99 L   (4.30-5.90)  m/uL


 


Hgb  12.7 L   (13.0-17.5)  gm/dL


 


Hct  38.0 L   (39.0-53.0)  %


 


Plt Count  145 L   (150-450)  k/uL


 


Chloride   108 H  ()  mmol/L


 


Glucose   100 H  (74-99)  mg/dL


 


Calcium   7.7 L  (8.4-10.2)  mg/dL


 


Total Protein   5.2 L  (6.3-8.2)  g/dL


 


Albumin   2.7 L  (3.5-5.0)  g/dL














Assessment and Plan


(1) Cellulitis


Status: Acute   Code(s): L03.90 - CELLULITIS, UNSPECIFIED   SNOMED Code(s): 

572913845


   


Plan: 


1patient with bilateral foot area swelling some redness blister formation 

especially to the left fifth toe and some clear drainage and minimal redness 

concerning for lower extremity cellulitis likely from gram-positive skin shiva.


2patient to continue with cefazolin to 2 g every 8 hour.


3local wound care to the open area with Aquacel silver dressing change every 48

hour.


 


Time with Patient: Less than 30

## 2022-07-16 ENCOUNTER — HOSPITAL ENCOUNTER (INPATIENT)
Dept: HOSPITAL 47 - EC | Age: 72
LOS: 10 days | Discharge: HOME HEALTH | DRG: 897 | End: 2022-07-26
Attending: INTERNAL MEDICINE | Admitting: INTERNAL MEDICINE
Payer: MEDICARE

## 2022-07-16 VITALS — BODY MASS INDEX: 29.7 KG/M2

## 2022-07-16 DIAGNOSIS — F10.231: ICD-10-CM

## 2022-07-16 DIAGNOSIS — Z90.49: ICD-10-CM

## 2022-07-16 DIAGNOSIS — Z86.19: ICD-10-CM

## 2022-07-16 DIAGNOSIS — Z82.5: ICD-10-CM

## 2022-07-16 DIAGNOSIS — E86.0: ICD-10-CM

## 2022-07-16 DIAGNOSIS — M17.11: ICD-10-CM

## 2022-07-16 DIAGNOSIS — Z87.19: ICD-10-CM

## 2022-07-16 DIAGNOSIS — Z28.310: ICD-10-CM

## 2022-07-16 DIAGNOSIS — I48.0: ICD-10-CM
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
## 2022-07-16 DIAGNOSIS — K21.9: ICD-10-CM

## 2022-07-16 DIAGNOSIS — R45.851: ICD-10-CM

## 2022-07-16 DIAGNOSIS — F41.8: ICD-10-CM

## 2022-07-16 DIAGNOSIS — Z82.61: ICD-10-CM

## 2022-07-16 DIAGNOSIS — Z91.81: ICD-10-CM

## 2022-07-16 DIAGNOSIS — I10: ICD-10-CM

## 2022-07-16 DIAGNOSIS — Z79.899: ICD-10-CM

## 2022-07-16 DIAGNOSIS — Z83.518: ICD-10-CM

## 2022-07-16 DIAGNOSIS — R27.0: ICD-10-CM

## 2022-07-16 DIAGNOSIS — F10.229: Primary | ICD-10-CM

## 2022-07-16 DIAGNOSIS — H93.13: ICD-10-CM

## 2022-07-16 DIAGNOSIS — Z87.11: ICD-10-CM

## 2022-07-16 DIAGNOSIS — Z86.73: ICD-10-CM

## 2022-07-16 DIAGNOSIS — Z82.49: ICD-10-CM

## 2022-07-16 DIAGNOSIS — Z82.2: ICD-10-CM

## 2022-07-16 DIAGNOSIS — M10.9: ICD-10-CM

## 2022-07-16 DIAGNOSIS — G62.9: ICD-10-CM

## 2022-07-16 LAB
ALBUMIN SERPL-MCNC: 4.4 G/DL (ref 3.5–5)
ALP SERPL-CCNC: 90 U/L (ref 38–126)
ALT SERPL-CCNC: 24 U/L (ref 4–49)
ANION GAP SERPL CALC-SCNC: 9 MMOL/L
APTT BLD: 25.3 SEC (ref 22–30)
AST SERPL-CCNC: 63 U/L (ref 17–59)
BASOPHILS # BLD AUTO: 0.1 K/UL (ref 0–0.2)
BASOPHILS NFR BLD AUTO: 1 %
BUN SERPL-SCNC: 12 MG/DL (ref 9–20)
CALCIUM SPEC-MCNC: 8.2 MG/DL (ref 8.4–10.2)
CHLORIDE SERPL-SCNC: 107 MMOL/L (ref 98–107)
CK SERPL-CCNC: 121 U/L (ref 55–170)
CO2 SERPL-SCNC: 23 MMOL/L (ref 22–30)
EOSINOPHIL # BLD AUTO: 0.1 K/UL (ref 0–0.7)
EOSINOPHIL NFR BLD AUTO: 2 %
ERYTHROCYTE [DISTWIDTH] IN BLOOD BY AUTOMATED COUNT: 5.67 M/UL (ref 4.3–5.9)
ERYTHROCYTE [DISTWIDTH] IN BLOOD: 14.6 % (ref 11.5–15.5)
GLUCOSE SERPL-MCNC: 103 MG/DL (ref 74–99)
HCT VFR BLD AUTO: 52.2 % (ref 39–53)
HGB BLD-MCNC: 17.9 GM/DL (ref 13–17.5)
INR PPP: 1.1 (ref ?–1.2)
LIPASE SERPL-CCNC: 96 U/L (ref 23–300)
LYMPHOCYTES # SPEC AUTO: 1.1 K/UL (ref 1–4.8)
LYMPHOCYTES NFR SPEC AUTO: 19 %
MAGNESIUM SPEC-SCNC: 2.1 MG/DL (ref 1.6–2.3)
MCH RBC QN AUTO: 31.6 PG (ref 25–35)
MCHC RBC AUTO-ENTMCNC: 34.4 G/DL (ref 31–37)
MCV RBC AUTO: 92 FL (ref 80–100)
MONOCYTES # BLD AUTO: 0.3 K/UL (ref 0–1)
MONOCYTES NFR BLD AUTO: 6 %
NEUTROPHILS # BLD AUTO: 4.1 K/UL (ref 1.3–7.7)
NEUTROPHILS NFR BLD AUTO: 71 %
PLATELET # BLD AUTO: 127 K/UL (ref 150–450)
POTASSIUM SERPL-SCNC: 6.4 MMOL/L (ref 3.5–5.1)
PROT SERPL-MCNC: 7.4 G/DL (ref 6.3–8.2)
PT BLD: 11.4 SEC (ref 9–12)
SODIUM SERPL-SCNC: 139 MMOL/L (ref 137–145)
WBC # BLD AUTO: 5.8 K/UL (ref 3.8–10.6)

## 2022-07-16 PROCEDURE — 93005 ELECTROCARDIOGRAM TRACING: CPT

## 2022-07-16 PROCEDURE — 85610 PROTHROMBIN TIME: CPT

## 2022-07-16 PROCEDURE — 80053 COMPREHEN METABOLIC PANEL: CPT

## 2022-07-16 PROCEDURE — 80048 BASIC METABOLIC PNL TOTAL CA: CPT

## 2022-07-16 PROCEDURE — 99285 EMERGENCY DEPT VISIT HI MDM: CPT

## 2022-07-16 PROCEDURE — 84132 ASSAY OF SERUM POTASSIUM: CPT

## 2022-07-16 PROCEDURE — 85025 COMPLETE CBC W/AUTO DIFF WBC: CPT

## 2022-07-16 PROCEDURE — 96374 THER/PROPH/DIAG INJ IV PUSH: CPT

## 2022-07-16 PROCEDURE — 36415 COLL VENOUS BLD VENIPUNCTURE: CPT

## 2022-07-16 PROCEDURE — 85730 THROMBOPLASTIN TIME PARTIAL: CPT

## 2022-07-16 PROCEDURE — 84550 ASSAY OF BLOOD/URIC ACID: CPT

## 2022-07-16 PROCEDURE — 83735 ASSAY OF MAGNESIUM: CPT

## 2022-07-16 PROCEDURE — 82550 ASSAY OF CK (CPK): CPT

## 2022-07-16 PROCEDURE — 80320 DRUG SCREEN QUANTALCOHOLS: CPT

## 2022-07-16 PROCEDURE — 83690 ASSAY OF LIPASE: CPT
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
## 2022-07-17 RX ADMIN — LORAZEPAM PRN MG: 2 INJECTION INTRAMUSCULAR; INTRAVENOUS at 13:26

## 2022-07-17 RX ADMIN — METOPROLOL TARTRATE SCH MG: 50 TABLET, FILM COATED ORAL at 08:45

## 2022-07-17 RX ADMIN — METOPROLOL TARTRATE SCH MG: 50 TABLET, FILM COATED ORAL at 21:46

## 2022-07-17 RX ADMIN — Medication SCH MG: at 06:58

## 2022-07-17 RX ADMIN — Medication SCH MG: at 15:19

## 2022-07-17 RX ADMIN — LORAZEPAM PRN MG: 2 INJECTION INTRAMUSCULAR; INTRAVENOUS at 08:46

## 2022-07-17 RX ADMIN — LORAZEPAM PRN MG: 2 INJECTION INTRAMUSCULAR; INTRAVENOUS at 04:45
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
## 2022-07-18 LAB
ANION GAP SERPL CALC-SCNC: 6 MMOL/L
BASOPHILS # BLD AUTO: 0 K/UL (ref 0–0.2)
BASOPHILS NFR BLD AUTO: 1 %
BUN SERPL-SCNC: 16 MG/DL (ref 9–20)
CALCIUM SPEC-MCNC: 8.5 MG/DL (ref 8.4–10.2)
CHLORIDE SERPL-SCNC: 107 MMOL/L (ref 98–107)
CO2 SERPL-SCNC: 26 MMOL/L (ref 22–30)
EOSINOPHIL # BLD AUTO: 0.3 K/UL (ref 0–0.7)
EOSINOPHIL NFR BLD AUTO: 4 %
ERYTHROCYTE [DISTWIDTH] IN BLOOD BY AUTOMATED COUNT: 5.64 M/UL (ref 4.3–5.9)
ERYTHROCYTE [DISTWIDTH] IN BLOOD: 14.1 % (ref 11.5–15.5)
GLUCOSE SERPL-MCNC: 104 MG/DL (ref 74–99)
HCT VFR BLD AUTO: 53 % (ref 39–53)
HGB BLD-MCNC: 16.9 GM/DL (ref 13–17.5)
LYMPHOCYTES # SPEC AUTO: 1.4 K/UL (ref 1–4.8)
LYMPHOCYTES NFR SPEC AUTO: 21 %
MCH RBC QN AUTO: 30 PG (ref 25–35)
MCHC RBC AUTO-ENTMCNC: 31.9 G/DL (ref 31–37)
MCV RBC AUTO: 94 FL (ref 80–100)
MONOCYTES # BLD AUTO: 0.3 K/UL (ref 0–1)
MONOCYTES NFR BLD AUTO: 5 %
NEUTROPHILS # BLD AUTO: 4.5 K/UL (ref 1.3–7.7)
NEUTROPHILS NFR BLD AUTO: 69 %
PLATELET # BLD AUTO: 125 K/UL (ref 150–450)
POTASSIUM SERPL-SCNC: 3.7 MMOL/L (ref 3.5–5.1)
SODIUM SERPL-SCNC: 139 MMOL/L (ref 137–145)
WBC # BLD AUTO: 6.6 K/UL (ref 3.8–10.6)

## 2022-07-18 RX ADMIN — Medication SCH MG: at 06:39

## 2022-07-18 RX ADMIN — ACETAMINOPHEN PRN MG: 325 TABLET, FILM COATED ORAL at 11:30

## 2022-07-18 RX ADMIN — GABAPENTIN SCH MG: 100 CAPSULE ORAL at 21:07

## 2022-07-18 RX ADMIN — METOPROLOL TARTRATE SCH MG: 50 TABLET, FILM COATED ORAL at 08:21

## 2022-07-18 RX ADMIN — Medication SCH MG: at 17:24

## 2022-07-18 RX ADMIN — HEPARIN SODIUM SCH UNIT: 5000 INJECTION INTRAVENOUS; SUBCUTANEOUS at 21:07

## 2022-07-18 RX ADMIN — GABAPENTIN SCH MG: 100 CAPSULE ORAL at 17:24

## 2022-07-18 NOTE — P.PN
Subjective


Progress Note Date: 07/18/22


Patient is a 71-year-old male with a known history of hypertension, paroxysmal 

atrial fibrillation not on any anticoagulation, history of CVA/TIA, GERD, severe

alcohol abuse, anxiety/depression and previous admissions with alcohol 

intoxication was brought to ER by EMS under petition by police for complaints of

alcohol intoxication and suicidal ideation.  He apparently told a passerby 

multiple times that he wanted to kill himself.  Patient is also pulmonitis and 

was petitioned by police.


Patient denies any complaints of chest pain or shortness of breath.  No fever no

chills.  Patient is somewhat poor historian currently.


EKG showed sinus rhythm


Laboratory test showed WBC 5.8 hemoglobin 17.9 and platelets 127


Sodium 139 potassium 6.4 on admission


Chloride 107 bicarb is 23 BUN 12 and creatinine 1.1 and blood sugar is 103 

calcium 8.2


Serum alcohol level is 227





07/18/2022


Patient evaluated today sitting up in chair. He denies alcohol withdrawal 

symptoms, no nausea, vomiting or diarrhea. No auditory or visual hallucinations.

He has been evaluated by psychiatry who is recommending 1:1 sitter precautions 

and recommending inpatient treatment once medically stable. Patient is denying 

suicidal ideations currently with no plan or intent. He states he was sitting on

porch drinking beer and frustrated about his house being condemned and was 

speaking to someone on the phone and had shouted "Fine I'll just kill myself 

then" and states someone must have called on him. Blood pressure elevated today 

190/90s and medications were adusted we will monitor overnight. He is on valium 

with alcohol withdrawal protocol and received a dose today. His labs today are 

showing potassium 3.7, sodium 139. Recommend follow up eval by psychiatry. 





Review of Systems





Constitutional: Denied any fatigue denied any fever.


Cardio vascular: denied any chest pain, palpitations


Gastrointestinal: denied any nausea, vomiting, diarrhea


Pulmonary: Denied any shortness of breath cough


Neurologic denied any new focal deficits





All inpatient medications were reviewed and appropriate changes in these 

medications as dictated in the interval history and assessment and plan.








PHYSICAL EXAMINATION: 


Patient is lying in the bed comfortably, no acute distress, awake alert but 

delirious.


HEENT: Normocephalic. Neck is supple. Pupils reactive. Nostrils clear. Oral 

cavity is moist. 


Neck reveals no JVD, carotid bruits, or thyromegaly. 


CHEST EXAMINATION: Trachea is central. Symmetrical expansion. Lung fields clear 

to auscultation and percussion. 


CARDIAC: Normal S1, S2 with no gallops. No murmurs 


ABDOMEN: Soft. Bowel sounds present.  Nontender.  No organomegaly. No abdominal 

bruits. 


Extremities: reveal no edema.  No clubbing or cyanosis


Neurologically awake, alert, delirious.  With well-coordinated movements.  No 

gross focal deficits noted


Skin: No rash or skin lesions. 


Psychiatric: Could not appreciate completely..


Musculoskeletal: No joint swelling or deformity.  Normal range of motion.





Assessment and Plan


Assessment


Acute alcohol intoxication


Suicidal ideation and delirium


Hyperkalemia due to hemolyzed sample.  Repeat was normal.


Paroxysmal atrial fibrillation currently sinus rhythm.  Not on anticoagulation.


Anxiety/depression


GERD


Hypertension


Heavy alcohol abuse on daily basis


Peripheral neuropathy 


DVT prophylaxis Heparin subcu





Plan:


Patient will be closely monitor for acute alcohol withdrawal and continue on 

valium protocol as needed. Gabapentin has been added for pain control, patient 

complains of chronic neuropathic pain in his bilateral lower extremities. 

Metoprolol was changed to carvedilol and continue with close blood pressure 

monitoring. Psychiatry has seen the patient and recommended inpatient transfer. 

He has denied suicidal ideations today and states this is situational and he 

might lose his home. We will follow-up closely. Possible transfer to [facility] 

tomorrow, although recommend re-eval by psychiatry.





The impression and plan of care has been dictated by Farnaz Bravo, Nurse 

Practitioner as directed.





Dr. Adamaris MD


I have performed a history and physical examination and medical decision making 

of this patient, discussed the same with the dictator, and agree with the 

dictators assessment and plan as written, documented as a scribe. Based on total

visit time, I have performed more than 50% of this visit. 











Objective





- Vital Signs


Vital signs: 


                                   Vital Signs











Temp  97 F L  07/18/22 08:00


 


Pulse  76   07/18/22 14:00


 


Resp  16   07/18/22 14:00


 


BP  156/81   07/18/22 14:00


 


Pulse Ox  95   07/18/22 14:00


 


FiO2      








                                 Intake & Output











 07/17/22 07/18/22 07/18/22





 18:59 06:59 18:59


 


Intake Total 500 240 480


 


Output Total  150 200


 


Balance 500 90 280


 


Intake:   


 


  Oral 500 240 480


 


Output:   


 


  Urine  150 200


 


Other:   


 


  Voiding Method Urinal  Urinal


 


  # Voids 2 1 














- Labs


CBC & Chem 7: 


                                 07/18/22 07:41





                                 07/18/22 07:41


Labs: 


                  Abnormal Lab Results - Last 24 Hours (Table)











  07/18/22 07/18/22 Range/Units





  07:41 07:41 


 


Plt Count  125 L   (150-450)  k/uL


 


Glucose   104 H  (74-99)  mg/dL














Assessment and Plan


Time with Patient: Less than 30

## 2022-07-19 RX ADMIN — Medication SCH MG: at 16:56

## 2022-07-19 RX ADMIN — GABAPENTIN SCH: 100 CAPSULE ORAL at 16:52

## 2022-07-19 RX ADMIN — ACETAMINOPHEN PRN MG: 325 TABLET, FILM COATED ORAL at 08:12

## 2022-07-19 RX ADMIN — GABAPENTIN SCH MG: 100 CAPSULE ORAL at 08:12

## 2022-07-19 RX ADMIN — FAMOTIDINE SCH MG: 20 TABLET, FILM COATED ORAL at 08:11

## 2022-07-19 RX ADMIN — GABAPENTIN SCH: 100 CAPSULE ORAL at 21:06

## 2022-07-19 RX ADMIN — HEPARIN SODIUM SCH UNIT: 5000 INJECTION INTRAVENOUS; SUBCUTANEOUS at 23:12

## 2022-07-19 RX ADMIN — HEPARIN SODIUM SCH UNIT: 5000 INJECTION INTRAVENOUS; SUBCUTANEOUS at 08:16

## 2022-07-19 RX ADMIN — Medication SCH MG: at 06:49

## 2022-07-19 NOTE — P.PN
Progress Note - Text


Progress Note Date: 07/19/22


Interval History:


Patient was seen resting in bed and was directable and agreeable to speak with 

writer in his room.  Currently, the patient is not expressing any suicidal or 

homicidal ideation, intention, and/or plan.  He vehemently states that it was a 

figure speech when he was speaking to his friend that he was suicidal as he was 

upset that the contractor whom he hired cancelled on him twice.  He vehemently 

denies any prior attempts at suicide.  The patient is currently alert and 

oriented in all spheres.  He is not reporting any auditory or visual 

hallucinations.  He is denying any paranoia or other delusions.  The patient 

does report that he drinks heavily however appears to be pre-contemplative in 

cutting down his use.  The patient states that he has cut down although gestured

to this provider that he has been drinking at least 2 pints of liquor per day.





Mental Status Exam:


General Appearance: Patient appears to be stated age is alert, directable, and 

cooperative. 


Behavior: Patient is calmly seated without any agitated behavior.


Speech: Patient's speech is fluent and nonpressured. 


Mood/Affect: Mood is improving mildly, affect is congruent and euthymic.


Suicidality/Homicidality:  Patient denies having any suicidal or homicidal 

ideation intent or plan.  


Perceptions: Patient denies any visual hallucinations and denies any auditory 

hallucinations  


Though content/process: There is no evidence of any delusional thought content 

and thought process is linear and goal-directed. 


Memory and concentration: AOX3, grossly intact for the purposes of this session


Judgment and insight: Improving mildly





                                   Vital Signs











Temp  98.5 F   07/19/22 03:26


 


Pulse  83   07/19/22 12:00


 


Resp  16   07/19/22 13:38


 


BP  153/83   07/19/22 12:00


 


Pulse Ox  96   07/19/22 12:00


 


FiO2      








                                 Intake & Output











 07/18/22 07/19/22 07/19/22





 18:59 06:59 18:59


 


Intake Total 480  270


 


Output Total 200  


 


Balance 280  270


 


Intake:   


 


  Oral 480  270


 


Output:   


 


  Urine 200  


 


Other:   


 


  Voiding Method Urinal  Urinal


 


  # Voids  1 














Assessment


Alcohol use disorder


Alcohol withdrawal


Delirium secondary to alcohol withdrawal - appears resolving





Plan:


-At this time patient DOES NOT meet criteria for inpatient psychiatric 

admission.  The patient's primary diagnosis is alcohol use disorder.  He is 

currently not presenting with any imminent risk of harm to self or others.  He 

expresses a strong desire to live for his dog.  He is future and goal oriented. 

He does have a risk factor of alcohol use disorder however is pre-contemplative 

on discontinuing his use.


-At this time, the patient appears to have regained capacity for medical 

decision-making.


-Delirium precautions recommended with patient including - avoiding use of 

narcotics and CNS sedatives, limit anticholinergic medications when possible, 

frequent re-orientation, minimize use of restraints, open window shades during 

the day and close them at night


-Would recommend the following medication changes/additions: 


-Agree with Valium and taper for alcohol withdrawal.


-CIWA protocol with PRN Ativan for breakthrough alcohol withdrawal. Continue to 

monitor vital signs.


-May discontinue one-to-one sitter.


-Patient is cleared psychiatrically for discharge with recommendations for 

outpatient follow-up with his primary care physician for alcohol use disorder.  

Recommend patient be given resources for substance abuse counseling.


-Patient was offered inpatient substance abuse rehabilitation to which she 

declined.


-Psychiatry will sign off at this time.  Please call us or reconsult us for any 

questions or concerns.

## 2022-07-20 RX ADMIN — Medication SCH MG: at 16:34

## 2022-07-20 RX ADMIN — HEPARIN SODIUM SCH UNIT: 5000 INJECTION INTRAVENOUS; SUBCUTANEOUS at 21:06

## 2022-07-20 RX ADMIN — HEPARIN SODIUM SCH: 5000 INJECTION INTRAVENOUS; SUBCUTANEOUS at 21:10

## 2022-07-20 RX ADMIN — FAMOTIDINE SCH MG: 20 TABLET, FILM COATED ORAL at 08:53

## 2022-07-20 RX ADMIN — GABAPENTIN SCH: 100 CAPSULE ORAL at 16:34

## 2022-07-20 RX ADMIN — ACETAMINOPHEN PRN MG: 325 TABLET, FILM COATED ORAL at 03:30

## 2022-07-20 RX ADMIN — GABAPENTIN SCH MG: 100 CAPSULE ORAL at 08:53

## 2022-07-20 RX ADMIN — Medication SCH MG: at 06:40

## 2022-07-20 RX ADMIN — GABAPENTIN SCH MG: 100 CAPSULE ORAL at 21:06

## 2022-07-20 RX ADMIN — HEPARIN SODIUM SCH UNIT: 5000 INJECTION INTRAVENOUS; SUBCUTANEOUS at 08:53

## 2022-07-20 NOTE — P.PN
Subjective


Progress Note Date: 07/19/22








Roshan Mir, is a 71-year-old male who was admitted through emergency room 

due to alcohol intoxication, delirium, depression and suicidal ideation


Patient was admitted to telemetry floor he was started on CIWA protocol, he was 

admitted by mistake under the hospitalist group service.  Today he is being 

transferred to my service, patient is well known to my practice.


Patient is somnolent, responsive, stating he reportedly had a couple of beers, 

he is upset twice he was brought into the hospital , he is complaining of 

bilateral knee pain, denies any complaints at this time.





Objective





- Vital Signs


Vital signs: 


                                   Vital Signs











Temp  98.5 F   07/19/22 03:26


 


Pulse  73   07/19/22 16:00


 


Resp  16   07/19/22 16:00


 


BP  176/85   07/19/22 16:00


 


Pulse Ox  97   07/19/22 16:00


 


FiO2      








                                 Intake & Output











 07/19/22 07/19/22 07/20/22





 06:59 18:59 06:59


 


Intake Total  972 


 


Balance  972 


 


Intake:   


 


  Oral  972 


 


Other:   


 


  Voiding Method  Urinal 


 


  # Voids 1 1 














- Exam





In general patient is alert and oriented x 3 in no distress


HEENT head normocephalic and atraumatic


Neck is supple no JVD no goiter no lymphadenopathy no carotid bruit


Chest examination is clear to auscultation no crackles no wheezing


Cardiac exam reveals regular heart sounds S1 and S2 no gallops no murmurs


Abdomen is soft nontender no organomegaly with normal bowel sounds


Extremity exam reveals no edema no cyanosis or clubbing


Neurological examination reveals no gross focal deficits





- Labs


CBC & Chem 7: 


                                 07/18/22 07:41





                                 07/18/22 07:41





Assessment and Plan


Plan: 








Acute alcohol intoxication on admission





Delirium and suicidal ideation on admission





Underlying history of depression with anxiety disorder





Underlying history of hypertension





Underlying history of paroxysmal atrial fibrillation not on anticoagulation due 

to high risk of falling





Known history of excessive alcohol use








At this time patient is admitted to telemetry floor he is maintained on CIWA 

protocol


He was evaluated by psychiatry and was approved for admission to the psychiatry 

unit when medically stable


For DVT prophylaxis patient is on subcu heparin


For GI prophylaxis Pepcid


Medication and labs were reviewed will follow in a.m.

## 2022-07-21 LAB
ALBUMIN SERPL-MCNC: 3.6 G/DL (ref 3.8–4.9)
ALBUMIN/GLOB SERPL: 1.5 G/DL (ref 1.6–3.17)
ALP SERPL-CCNC: 66 U/L (ref 41–126)
ALT SERPL-CCNC: 12 U/L (ref 10–49)
ANION GAP SERPL CALC-SCNC: 12 MMOL/L (ref 10–18)
AST SERPL-CCNC: 18 U/L (ref 14–35)
BASOPHILS # BLD AUTO: 0.03 X 10*3/UL (ref 0–0.1)
BASOPHILS NFR BLD AUTO: 0.4 %
BUN SERPL-SCNC: 16.2 MG/DL (ref 9–27)
BUN/CREAT SERPL: 16.2 RATIO (ref 12–20)
CALCIUM SPEC-MCNC: 8.6 MG/DL (ref 8.7–10.3)
CHLORIDE SERPL-SCNC: 104 MMOL/L (ref 96–109)
CO2 SERPL-SCNC: 22 MMOL/L (ref 20–27.5)
EOSINOPHIL # BLD AUTO: 0.26 X 10*3/UL (ref 0.04–0.35)
EOSINOPHIL NFR BLD AUTO: 3.6 %
ERYTHROCYTE [DISTWIDTH] IN BLOOD BY AUTOMATED COUNT: 5.31 X 10*6/UL (ref 4.4–5.6)
ERYTHROCYTE [DISTWIDTH] IN BLOOD: 13.6 % (ref 11.5–14.5)
GLOBULIN SER CALC-MCNC: 2.4 G/DL (ref 1.6–3.3)
GLUCOSE SERPL-MCNC: 116 MG/DL (ref 70–110)
HCT VFR BLD AUTO: 49.1 % (ref 39.6–50)
HGB BLD-MCNC: 15.9 G/DL (ref 13–17)
IMM GRANULOCYTES BLD QL AUTO: 0.7 %
LYMPHOCYTES # SPEC AUTO: 1.36 X 10*3/UL (ref 0.9–5)
LYMPHOCYTES NFR SPEC AUTO: 18.9 %
MCH RBC QN AUTO: 29.9 PG (ref 27–32)
MCHC RBC AUTO-ENTMCNC: 32.4 G/DL (ref 32–37)
MCV RBC AUTO: 92.5 FL (ref 80–97)
MONOCYTES # BLD AUTO: 0.63 X 10*3/UL (ref 0.2–1)
MONOCYTES NFR BLD AUTO: 8.7 %
NEUTROPHILS # BLD AUTO: 4.88 X 10*3/UL (ref 1.8–7.7)
NEUTROPHILS NFR BLD AUTO: 67.7 %
NRBC BLD AUTO-RTO: 0 /100 WBCS (ref 0–0)
PLATELET # BLD AUTO: 124 X 10*3/UL (ref 140–440)
POTASSIUM SERPL-SCNC: 4 MMOL/L (ref 3.5–5.5)
PROT SERPL-MCNC: 6 G/DL (ref 6.2–8.2)
SODIUM SERPL-SCNC: 138 MMOL/L (ref 135–145)
WBC # BLD AUTO: 7.21 X 10*3/UL (ref 4.5–10)

## 2022-07-21 RX ADMIN — HEPARIN SODIUM SCH UNIT: 5000 INJECTION INTRAVENOUS; SUBCUTANEOUS at 08:49

## 2022-07-21 RX ADMIN — FAMOTIDINE SCH MG: 20 TABLET, FILM COATED ORAL at 08:47

## 2022-07-21 RX ADMIN — ACETAMINOPHEN PRN MG: 325 TABLET, FILM COATED ORAL at 05:37

## 2022-07-21 RX ADMIN — GABAPENTIN SCH MG: 100 CAPSULE ORAL at 21:41

## 2022-07-21 RX ADMIN — GABAPENTIN SCH MG: 100 CAPSULE ORAL at 08:48

## 2022-07-21 RX ADMIN — Medication SCH MG: at 15:11

## 2022-07-21 RX ADMIN — Medication SCH MG: at 08:48

## 2022-07-21 RX ADMIN — GABAPENTIN SCH MG: 100 CAPSULE ORAL at 15:11

## 2022-07-21 RX ADMIN — HEPARIN SODIUM SCH: 5000 INJECTION INTRAVENOUS; SUBCUTANEOUS at 09:06

## 2022-07-21 RX ADMIN — HEPARIN SODIUM SCH: 5000 INJECTION INTRAVENOUS; SUBCUTANEOUS at 21:41

## 2022-07-21 RX ADMIN — ACETAMINOPHEN PRN MG: 325 TABLET, FILM COATED ORAL at 15:09

## 2022-07-21 NOTE — XR
PROCEDURE: XR knee 4V RT

DATE AND TIME: 7/21/2022 5:58 PM

 

CLINICAL INDICATION: Right knee pain and swelling

 

TECHNIQUE: AP, oblique AP, crosstable lateral, and patellar sunrise views

 

COMPARISON: None

 

FINDINGS: 

There is a moderate joint effusion present.

 

There is no fracture or malalignment.

 

The soft tissues are remarkable for circumferential soft tissue swelling about the knee. No focal fin
dings.

 

IMPRESSION:

1.  Joint effusion.

2.  Soft tissue swelling.

## 2022-07-21 NOTE — P.PN
Subjective


Progress Note Date: 07/20/22








Roshan Mir, is a 71-year-old male who was admitted through emergency room 

due to alcohol intoxication, delirium, depression and suicidal ideation


Patient was admitted to telemetry floor he was started on CIWA protocol, he was 

admitted by mistake under the hospitalist group service.  Today he is being 

transferred to my service, patient is well known to my practice.


Patient is somnolent, responsive, stating he reportedly had a couple of beers, 

he is upset twice he was brought into the hospital , he is complaining of 

bilateral knee pain, denies any complaints at this time.





On 07/20/2022 patient was seen and examined on the telemetry floor he is alert 

and oriented 3 in no apparent distress, he is somnolent, with occasional 

episode of agitation, he is complaining of right knee pain otherwise he denies 

any complaints, there is no fever or chills no headache or dizziness no chest 

pain no shortness of breath no cough no nausea or vomiting no abdominal pain no 

diarrhea and no urinary symptoms





Objective





- Vital Signs


Vital signs: 


                                   Vital Signs











Temp  98.2 F   07/20/22 03:44


 


Pulse  74   07/20/22 12:00


 


Resp  16   07/20/22 12:00


 


BP  160/87   07/20/22 12:00


 


Pulse Ox  97   07/20/22 12:00


 


FiO2      








                                 Intake & Output











 07/19/22 07/20/22 07/20/22





 18:59 06:59 18:59


 


Intake Total 972 1320 120


 


Output Total  300 500


 


Balance 972 1020 -380


 


Intake:   


 


  Oral 972 1320 120


 


Output:   


 


  Urine   500


 


  Urine/Stool Mix  300 


 


Other:   


 


  Voiding Method Urinal External Catheter External Catheter


 


  # Voids 1  


 


  # Bowel Movements  1 














- Exam





In general patient is alert and oriented x 3 in no distress


HEENT head normocephalic and atraumatic


Neck is supple no JVD no goiter no lymphadenopathy no carotid bruit


Chest examination is clear to auscultation no crackles no wheezing


Cardiac exam reveals regular heart sounds S1 and S2 no gallops no murmurs


Abdomen is soft nontender no organomegaly with normal bowel sounds


Extremity exam reveals no edema no cyanosis or clubbing


Neurological examination reveals no gross focal deficits





- Labs


CBC & Chem 7: 


                                 07/21/22 06:04





                                 07/21/22 06:04





Assessment and Plan


Plan: 








Acute alcohol intoxication on admission





Delirium and suicidal ideation on admission





Underlying history of depression with anxiety disorder





Underlying history of hypertension





Underlying history of paroxysmal atrial fibrillation not on anticoagulation due 

to high risk of falling





Known history of excessive alcohol use








At this time patient is admitted to telemetry floor he is maintained on CIWA 

protocol


He was evaluated by psychiatry and was approved for admission to the psychiatry 

unit when medically stable


For DVT prophylaxis patient is on subcu heparin


For GI prophylaxis Pepcid


Medication and labs were reviewed will follow in a.m.

## 2022-07-21 NOTE — P.PN
Subjective


Progress Note Date: 07/21/22








Roshan Mir, is a 71-year-old male who was admitted through emergency room 

due to alcohol intoxication, delirium, depression and suicidal ideation


Patient was admitted to telemetry floor he was started on CIWA protocol, he was 

admitted by mistake under the hospitalist group service.  Today he is being 

transferred to my service, patient is well known to my practice.


Patient is somnolent, responsive, stating he reportedly had a couple of beers, 

he is upset twice he was brought into the hospital , he is complaining of 

bilateral knee pain, denies any complaints at this time.





On 07/20/2022 patient was seen and examined on the telemetry floor he is alert 

and oriented 3 in no apparent distress, he is somnolent, with occasional 

episode of agitation, he is complaining of right knee pain otherwise he denies 

any complaints, there is no fever or chills no headache or dizziness no chest 

pain no shortness of breath no cough no nausea or vomiting no abdominal pain no 

diarrhea and no urinary symptoms





On 07/21/2022 patient was seen and examined on the medical floor he is alert and

oriented 3 he is more awake today there is less agitation he is able to 

tolerate diet well he is having difficulty standing up and walking physical 

therapy and occupational therapy are consulted patient is complaining of right 

knee pain there is some swelling in the right knee at this time otherwise there 

is no fever or chills no headache or dizziness no chest pain no shortness of 

breath no cough no nausea or vomiting no abdominal pain no diarrhea and no 

urinary symptoms





Objective





- Vital Signs


Vital signs: 


                                   Vital Signs











Temp  97.6 F   07/21/22 14:11


 


Pulse  78   07/21/22 14:11


 


Resp  18   07/21/22 14:11


 


BP  149/84   07/21/22 14:11


 


Pulse Ox  96   07/21/22 14:11


 


FiO2      








                                 Intake & Output











 07/20/22 07/21/22 07/21/22





 18:59 06:59 18:59


 


Intake Total 120  360


 


Output Total 500  


 


Balance -380  360


 


Weight  91.5 kg 


 


Intake:   


 


  Oral 120  360


 


Output:   


 


  Urine 500  


 


Other:   


 


  Voiding Method External Catheter Urinal Urinal


 


  # Voids  2 2


 


  # Bowel Movements  1 














- Exam





In general patient is alert and oriented x 3 in no distress


HEENT head normocephalic and atraumatic


Neck is supple no JVD no goiter no lymphadenopathy no carotid bruit


Chest examination is clear to auscultation no crackles no wheezing


Cardiac exam reveals regular heart sounds S1 and S2 no gallops no murmurs


Abdomen is soft nontender no organomegaly with normal bowel sounds


Extremity exam reveals no edema no cyanosis or clubbing


Neurological examination reveals no gross focal deficits





- Labs


CBC & Chem 7: 


                                 07/21/22 06:04





                                 07/21/22 06:04


Labs: 


                  Abnormal Lab Results - Last 24 Hours (Table)











  07/21/22 07/21/22 Range/Units





  06:04 06:04 


 


Plt Count  124 L   (140-440)  X 10*3/uL


 


Immature Gran #  0.05 H   (0.00-0.04)  X 10*3/uL


 


Glucose   116 H  ()  mg/dL


 


Calcium   8.6 L  (8.7-10.3)  mg/dL


 


Total Protein   6.0 L  (6.2-8.2)  g/dL


 


Albumin   3.6 L  (3.8-4.9)  g/dL


 


Albumin/Globulin Ratio   1.50 L  (1.60-3.17)  g/dL














Assessment and Plan


Plan: 








Acute alcohol intoxication on admission





Delirium and suicidal ideation on admission





Underlying history of depression with anxiety disorder





Underlying history of hypertension





Underlying history of paroxysmal atrial fibrillation not on anticoagulation due 

to high risk of falling





Known history of excessive alcohol use








At this time patient is admitted to telemetry floor he is maintained on CIWA 

protocol


He was evaluated by psychiatry and was approved for admission to the psychiatry 

unit when medically stable


For DVT prophylaxis patient is on subcu heparin


For GI prophylaxis Pepcid


Medication and labs were reviewed will follow in a.m.

## 2022-07-22 LAB
ALBUMIN SERPL-MCNC: 3.8 G/DL (ref 3.8–4.9)
ALBUMIN/GLOB SERPL: 1.57 G/DL (ref 1.6–3.17)
ALP SERPL-CCNC: 67 U/L (ref 41–126)
ALT SERPL-CCNC: 16 U/L (ref 10–49)
ANION GAP SERPL CALC-SCNC: 10.4 MMOL/L (ref 10–18)
AST SERPL-CCNC: 22 U/L (ref 14–35)
BASOPHILS # BLD AUTO: 0.04 X 10*3/UL (ref 0–0.1)
BASOPHILS NFR BLD AUTO: 0.6 %
BUN SERPL-SCNC: 26.7 MG/DL (ref 9–27)
BUN/CREAT SERPL: 21.36 RATIO (ref 12–20)
CALCIUM SPEC-MCNC: 8.7 MG/DL (ref 8.7–10.3)
CHLORIDE SERPL-SCNC: 108 MMOL/L (ref 96–109)
CO2 SERPL-SCNC: 24.5 MMOL/L (ref 20–27.5)
EOSINOPHIL # BLD AUTO: 0.31 X 10*3/UL (ref 0.04–0.35)
EOSINOPHIL NFR BLD AUTO: 4.8 %
ERYTHROCYTE [DISTWIDTH] IN BLOOD BY AUTOMATED COUNT: 5.16 X 10*6/UL (ref 4.4–5.6)
ERYTHROCYTE [DISTWIDTH] IN BLOOD: 13.8 % (ref 11.5–14.5)
GLOBULIN SER CALC-MCNC: 2.4 G/DL (ref 1.6–3.3)
GLUCOSE SERPL-MCNC: 107 MG/DL (ref 70–110)
HCT VFR BLD AUTO: 47.8 % (ref 39.6–50)
HGB BLD-MCNC: 15.2 G/DL (ref 13–17)
IMM GRANULOCYTES BLD QL AUTO: 0.3 %
LYMPHOCYTES # SPEC AUTO: 1.39 X 10*3/UL (ref 0.9–5)
LYMPHOCYTES NFR SPEC AUTO: 21.6 %
MCH RBC QN AUTO: 29.5 PG (ref 27–32)
MCHC RBC AUTO-ENTMCNC: 31.8 G/DL (ref 32–37)
MCV RBC AUTO: 92.6 FL (ref 80–97)
MONOCYTES # BLD AUTO: 0.62 X 10*3/UL (ref 0.2–1)
MONOCYTES NFR BLD AUTO: 9.6 %
NEUTROPHILS # BLD AUTO: 4.06 X 10*3/UL (ref 1.8–7.7)
NEUTROPHILS NFR BLD AUTO: 63.1 %
NRBC BLD AUTO-RTO: 0 /100 WBCS (ref 0–0)
PLATELET # BLD AUTO: 145 X 10*3/UL (ref 140–440)
POTASSIUM SERPL-SCNC: 4.5 MMOL/L (ref 3.5–5.5)
PROT SERPL-MCNC: 6.2 G/DL (ref 6.2–8.2)
SODIUM SERPL-SCNC: 143 MMOL/L (ref 135–145)
WBC # BLD AUTO: 6.44 X 10*3/UL (ref 4.5–10)

## 2022-07-22 RX ADMIN — Medication SCH MG: at 17:08

## 2022-07-22 RX ADMIN — HEPARIN SODIUM SCH: 5000 INJECTION INTRAVENOUS; SUBCUTANEOUS at 08:22

## 2022-07-22 RX ADMIN — GABAPENTIN SCH MG: 100 CAPSULE ORAL at 21:15

## 2022-07-22 RX ADMIN — HEPARIN SODIUM SCH: 5000 INJECTION INTRAVENOUS; SUBCUTANEOUS at 21:09

## 2022-07-22 RX ADMIN — GABAPENTIN SCH MG: 100 CAPSULE ORAL at 17:08

## 2022-07-22 RX ADMIN — Medication SCH MG: at 08:20

## 2022-07-22 RX ADMIN — GABAPENTIN SCH MG: 100 CAPSULE ORAL at 08:21

## 2022-07-22 RX ADMIN — FAMOTIDINE SCH MG: 20 TABLET, FILM COATED ORAL at 08:21

## 2022-07-22 RX ADMIN — ACETAMINOPHEN PRN MG: 325 TABLET, FILM COATED ORAL at 21:14

## 2022-07-22 NOTE — P.PN
Subjective


Progress Note Date: 07/22/22








Roshan Mir, is a 71-year-old male who was admitted through emergency room 

due to alcohol intoxication, delirium, depression and suicidal ideation


Patient was admitted to telemetry floor he was started on CIWA protocol, he was 

admitted by mistake under the hospitalist group service.  Today he is being 

transferred to my service, patient is well known to my practice.


Patient is somnolent, responsive, stating he reportedly had a couple of beers, 

he is upset twice he was brought into the hospital , he is complaining of 

bilateral knee pain, denies any complaints at this time.





On 07/20/2022 patient was seen and examined on the telemetry floor he is alert 

and oriented 3 in no apparent distress, he is somnolent, with occasional 

episode of agitation, he is complaining of right knee pain otherwise he denies 

any complaints, there is no fever or chills no headache or dizziness no chest 

pain no shortness of breath no cough no nausea or vomiting no abdominal pain no 

diarrhea and no urinary symptoms





On 07/21/2022 patient was seen and examined on the medical floor he is alert and

oriented 3 he is more awake today there is less agitation he is able to 

tolerate diet well he is having difficulty standing up and walking physical 

therapy and occupational therapy are consulted patient is complaining of right 

knee pain there is some swelling in the right knee at this time otherwise there 

is no fever or chills no headache or dizziness no chest pain no shortness of 

breath no cough no nausea or vomiting no abdominal pain no diarrhea and no 

urinary symptoms





On 07/22/2022 patient was seen and examined on the medical floor he is alert and

oriented 3 in no apparent distress he is still complaining of severe pain and 

swelling in the right knee he is not able to bend his knee consutation for ort

hopedic surgery was requested, otherwise patient denies any complaints there is 

no fever or chills no headache or dizziness no chest pain no shortness of breath

no cough no nausea or vomiting no abdominal pain no diarrhea and no urinary 

symptoms





Objective





- Vital Signs


Vital signs: 


                                   Vital Signs











Temp  98 F   07/22/22 07:17


 


Pulse  69   07/22/22 07:17


 


Resp  20   07/22/22 07:17


 


BP  159/77   07/22/22 07:17


 


Pulse Ox  95   07/22/22 07:17


 


FiO2      








                                 Intake & Output











 07/21/22 07/22/22 07/22/22





 18:59 06:59 18:59


 


Intake Total 360  


 


Output Total 200 200 


 


Balance 160 -200 


 


Intake:   


 


  Oral 360  


 


Output:   


 


  Urine 200 200 


 


Other:   


 


  Voiding Method Urinal Urinal 


 


  # Voids 2 0 














- Exam





In general patient is alert and oriented x 3 in no distress


HEENT head normocephalic and atraumatic


Neck is supple no JVD no goiter no lymphadenopathy no carotid bruit


Chest examination is clear to auscultation no crackles no wheezing


Cardiac exam reveals regular heart sounds S1 and S2 no gallops no murmurs


Abdomen is soft nontender no organomegaly with normal bowel sounds


Extremity exam reveals no edema no cyanosis or clubbing


Neurological examination reveals no gross focal deficits





- Labs


CBC & Chem 7: 


                                 07/22/22 05:17





                                 07/22/22 05:17


Labs: 


                  Abnormal Lab Results - Last 24 Hours (Table)











  07/22/22 07/22/22 Range/Units





  05:17 05:17 


 


MCHC  31.8 L   (32.0-37.0)  g/dL


 


Est GFR (CKD-EPI)NonAf   56.8 L  (60.0-200.0)   


 


BUN/Creatinine Ratio   21.36 H  (12.00-20.00)  Ratio


 


Albumin/Globulin Ratio   1.57 L  (1.60-3.17)  g/dL














Assessment and Plan


Plan: 








Acute alcohol intoxication on admission





Delirium and suicidal ideation on admission





Underlying history of depression with anxiety disorder





Underlying history of hypertension





Underlying history of paroxysmal atrial fibrillation not on anticoagulation due 

to high risk of falling





Known history of excessive alcohol use








At this time patient is admitted to telemetry floor he is maintained on CIWA 

protocol


He was evaluated by psychiatry and was approved for admission to the psychiatry 

unit when medically stable


For DVT prophylaxis patient is on subcu heparin


For GI prophylaxis Pepcid


Medication and labs were reviewed will follow in a.m.
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
## 2022-07-23 LAB
ALBUMIN SERPL-MCNC: 3.8 G/DL (ref 3.8–4.9)
ALBUMIN/GLOB SERPL: 1.47 G/DL (ref 1.6–3.17)
ALP SERPL-CCNC: 76 U/L (ref 41–126)
ALT SERPL-CCNC: 24 U/L (ref 10–49)
ANION GAP SERPL CALC-SCNC: 9.8 MMOL/L (ref 10–18)
AST SERPL-CCNC: 32 U/L (ref 14–35)
BASOPHILS # BLD AUTO: 0.03 X 10*3/UL (ref 0–0.1)
BASOPHILS NFR BLD AUTO: 0.4 %
BUN SERPL-SCNC: 29.7 MG/DL (ref 9–27)
BUN/CREAT SERPL: 27.25 RATIO (ref 12–20)
CALCIUM SPEC-MCNC: 8.8 MG/DL (ref 8.7–10.3)
CHLORIDE SERPL-SCNC: 104 MMOL/L (ref 96–109)
CO2 SERPL-SCNC: 27.1 MMOL/L (ref 20–27.5)
EOSINOPHIL # BLD AUTO: 0.3 X 10*3/UL (ref 0.04–0.35)
EOSINOPHIL NFR BLD AUTO: 4.5 %
ERYTHROCYTE [DISTWIDTH] IN BLOOD BY AUTOMATED COUNT: 5.16 X 10*6/UL (ref 4.4–5.6)
ERYTHROCYTE [DISTWIDTH] IN BLOOD: 14 % (ref 11.5–14.5)
GLOBULIN SER CALC-MCNC: 2.6 G/DL (ref 1.6–3.3)
GLUCOSE BLD-MCNC: 168 MG/DL (ref 70–110)
GLUCOSE SERPL-MCNC: 99 MG/DL (ref 70–110)
HCT VFR BLD AUTO: 48.1 % (ref 39.6–50)
HGB BLD-MCNC: 15.5 G/DL (ref 13–17)
IMM GRANULOCYTES BLD QL AUTO: 0.4 %
LYMPHOCYTES # SPEC AUTO: 1.26 X 10*3/UL (ref 0.9–5)
LYMPHOCYTES NFR SPEC AUTO: 18.8 %
MCH RBC QN AUTO: 30 PG (ref 27–32)
MCHC RBC AUTO-ENTMCNC: 32.2 G/DL (ref 32–37)
MCV RBC AUTO: 93.2 FL (ref 80–97)
MONOCYTES # BLD AUTO: 0.71 X 10*3/UL (ref 0.2–1)
MONOCYTES NFR BLD AUTO: 10.6 %
NEUTROPHILS # BLD AUTO: 4.38 X 10*3/UL (ref 1.8–7.7)
NEUTROPHILS NFR BLD AUTO: 65.3 %
NRBC BLD AUTO-RTO: 0 /100 WBCS (ref 0–0)
PLATELET # BLD AUTO: 159 X 10*3/UL (ref 140–440)
POTASSIUM SERPL-SCNC: 4.2 MMOL/L (ref 3.5–5.5)
PROT SERPL-MCNC: 6.4 G/DL (ref 6.2–8.2)
SODIUM SERPL-SCNC: 141 MMOL/L (ref 135–145)
WBC # BLD AUTO: 6.71 X 10*3/UL (ref 4.5–10)

## 2022-07-23 RX ADMIN — GABAPENTIN SCH MG: 100 CAPSULE ORAL at 17:14

## 2022-07-23 RX ADMIN — Medication SCH MG: at 09:38

## 2022-07-23 RX ADMIN — FAMOTIDINE SCH MG: 20 TABLET, FILM COATED ORAL at 09:38

## 2022-07-23 RX ADMIN — METHYLPREDNISOLONE SODIUM SUCCINATE SCH MG: 125 INJECTION, POWDER, FOR SOLUTION INTRAMUSCULAR; INTRAVENOUS at 17:14

## 2022-07-23 RX ADMIN — Medication SCH MG: at 17:14

## 2022-07-23 RX ADMIN — GABAPENTIN SCH MG: 100 CAPSULE ORAL at 09:38

## 2022-07-23 RX ADMIN — HEPARIN SODIUM SCH: 5000 INJECTION INTRAVENOUS; SUBCUTANEOUS at 09:27

## 2022-07-23 RX ADMIN — ACETAMINOPHEN PRN MG: 325 TABLET, FILM COATED ORAL at 21:14

## 2022-07-23 RX ADMIN — HEPARIN SODIUM SCH: 5000 INJECTION INTRAVENOUS; SUBCUTANEOUS at 21:06

## 2022-07-23 RX ADMIN — GABAPENTIN SCH MG: 100 CAPSULE ORAL at 21:08

## 2022-07-23 NOTE — P.PN
Subjective


Progress Note Date: 07/23/22








Roshan Mir, is a 71-year-old male who was admitted through emergency room 

due to alcohol intoxication, delirium, depression and suicidal ideation


Patient was admitted to telemetry floor he was started on CIWA protocol, he was 

admitted by mistake under the hospitalist group service.  Today he is being 

transferred to my service, patient is well known to my practice.


Patient is somnolent, responsive, stating he reportedly had a couple of beers, 

he is upset twice he was brought into the hospital , he is complaining of 

bilateral knee pain, denies any complaints at this time.





On 07/20/2022 patient was seen and examined on the telemetry floor he is alert 

and oriented 3 in no apparent distress, he is somnolent, with occasional 

episode of agitation, he is complaining of right knee pain otherwise he denies 

any complaints, there is no fever or chills no headache or dizziness no chest 

pain no shortness of breath no cough no nausea or vomiting no abdominal pain no 

diarrhea and no urinary symptoms





On 07/21/2022 patient was seen and examined on the medical floor he is alert and

oriented 3 he is more awake today there is less agitation he is able to 

tolerate diet well he is having difficulty standing up and walking physical 

therapy and occupational therapy are consulted patient is complaining of right 

knee pain there is some swelling in the right knee at this time otherwise there 

is no fever or chills no headache or dizziness no chest pain no shortness of 

breath no cough no nausea or vomiting no abdominal pain no diarrhea and no 

urinary symptoms





On 07/22/2022 patient was seen and examined on the medical floor he is alert and

oriented 3 in no apparent distress he is still complaining of severe pain and 

swelling in the right knee he is not able to bend his knee consutation for ort

hopedic surgery was requested, otherwise patient denies any complaints there is 

no fever or chills no headache or dizziness no chest pain no shortness of breath

no cough no nausea or vomiting no abdominal pain no diarrhea and no urinary 

symptoms





On 07/23/2022 patient was seen and examined on the medical floor he is alert and

oriented 3 in no apparent distress he is still complaining of severe pain and 

swelling in the right knee with difficulty standing and walking otherwise he 

denies any complaints there is no fever or chills no headache or dizziness no 

chest pain no shortness of breath no cough no nausea or vomiting no abdominal 

pain no diarrhea and no urinary symptoms





Objective





- Vital Signs


Vital signs: 


                                   Vital Signs











Temp  97.9 F   07/23/22 11:33


 


Pulse  73   07/23/22 11:33


 


Resp  20   07/23/22 11:33


 


BP  160/82   07/23/22 11:33


 


Pulse Ox  95   07/23/22 11:33


 


FiO2      








                                 Intake & Output











 07/22/22 07/23/22 07/23/22





 18:59 06:59 18:59


 


Intake Total  590 


 


Output Total  400 


 


Balance  190 


 


Weight 91.5 kg  


 


Intake:   


 


  Oral  590 


 


Output:   


 


  Urine  400 


 


Other:   


 


  Voiding Method  Urinal Bedside Commode





   Urinal


 


  # Voids 2  


 


  # Bowel Movements 1  














- Exam





In general patient is alert and oriented x 3 in no distress


HEENT head normocephalic and atraumatic


Neck is supple no JVD no goiter no lymphadenopathy no carotid bruit


Chest examination is clear to auscultation no crackles no wheezing


Cardiac exam reveals regular heart sounds S1 and S2 no gallops no murmurs


Abdomen is soft nontender no organomegaly with normal bowel sounds


Extremity exam reveals no edema no cyanosis or clubbing


Neurological examination reveals no gross focal deficits





- Labs


CBC & Chem 7: 


                                 07/23/22 05:52





                                 07/23/22 05:52


Labs: 


                  Abnormal Lab Results - Last 24 Hours (Table)











  07/23/22 Range/Units





  05:52 


 


Anion Gap  9.80 L  (10.00-18.00)  mmol/L


 


BUN  29.7 H  (9.0-27.0)  mg/dL


 


BUN/Creatinine Ratio  27.25 H  (12.00-20.00)  Ratio


 


Albumin/Globulin Ratio  1.47 L  (1.60-3.17)  g/dL














Assessment and Plan


Plan: 








Acute alcohol intoxication on admission





Delirium and suicidal ideation on admission





Underlying history of depression with anxiety disorder





Underlying history of hypertension





Underlying history of paroxysmal atrial fibrillation not on anticoagulation due 

to high risk of falling





Known history of excessive alcohol use





Pain and swelling in the right knee likely related to acute gout, patient 

started on oral Zyloprim he was also started on IV Solu-Medrol today








At this time patient is admitted to telemetry floor he is maintained on CIWA 

protocol


He was evaluated by psychiatry and was approved for admission to the psychiatry 

unit when medically stable


For DVT prophylaxis patient is on subcu heparin


For GI prophylaxis Pepcid


Medication and labs were reviewed will follow in a.m.

## 2022-07-24 LAB
GLUCOSE BLD-MCNC: 173 MG/DL (ref 70–110)
GLUCOSE BLD-MCNC: 175 MG/DL (ref 70–110)
GLUCOSE BLD-MCNC: 194 MG/DL (ref 70–110)
GLUCOSE BLD-MCNC: 203 MG/DL (ref 70–110)

## 2022-07-24 RX ADMIN — GABAPENTIN SCH MG: 100 CAPSULE ORAL at 22:02

## 2022-07-24 RX ADMIN — Medication SCH MG: at 17:17

## 2022-07-24 RX ADMIN — HEPARIN SODIUM SCH UNIT: 5000 INJECTION INTRAVENOUS; SUBCUTANEOUS at 09:12

## 2022-07-24 RX ADMIN — FAMOTIDINE SCH MG: 20 TABLET, FILM COATED ORAL at 09:12

## 2022-07-24 RX ADMIN — GABAPENTIN SCH MG: 100 CAPSULE ORAL at 17:17

## 2022-07-24 RX ADMIN — METHYLPREDNISOLONE SODIUM SUCCINATE SCH MG: 125 INJECTION, POWDER, FOR SOLUTION INTRAMUSCULAR; INTRAVENOUS at 23:02

## 2022-07-24 RX ADMIN — INSULIN ASPART SCH UNIT: 100 INJECTION, SOLUTION INTRAVENOUS; SUBCUTANEOUS at 13:03

## 2022-07-24 RX ADMIN — HEPARIN SODIUM SCH: 5000 INJECTION INTRAVENOUS; SUBCUTANEOUS at 08:14

## 2022-07-24 RX ADMIN — GABAPENTIN SCH MG: 100 CAPSULE ORAL at 09:12

## 2022-07-24 RX ADMIN — METHYLPREDNISOLONE SODIUM SUCCINATE SCH MG: 125 INJECTION, POWDER, FOR SOLUTION INTRAMUSCULAR; INTRAVENOUS at 00:04

## 2022-07-24 RX ADMIN — Medication SCH MG: at 09:12

## 2022-07-24 RX ADMIN — METHYLPREDNISOLONE SODIUM SUCCINATE SCH MG: 125 INJECTION, POWDER, FOR SOLUTION INTRAMUSCULAR; INTRAVENOUS at 13:03

## 2022-07-24 RX ADMIN — HEPARIN SODIUM SCH UNIT: 5000 INJECTION INTRAVENOUS; SUBCUTANEOUS at 20:48

## 2022-07-24 RX ADMIN — INSULIN ASPART SCH UNIT: 100 INJECTION, SOLUTION INTRAVENOUS; SUBCUTANEOUS at 17:17

## 2022-07-24 RX ADMIN — METHYLPREDNISOLONE SODIUM SUCCINATE SCH MG: 125 INJECTION, POWDER, FOR SOLUTION INTRAMUSCULAR; INTRAVENOUS at 06:08

## 2022-07-24 RX ADMIN — METHYLPREDNISOLONE SODIUM SUCCINATE SCH MG: 125 INJECTION, POWDER, FOR SOLUTION INTRAMUSCULAR; INTRAVENOUS at 17:17

## 2022-07-24 RX ADMIN — INSULIN ASPART SCH UNIT: 100 INJECTION, SOLUTION INTRAVENOUS; SUBCUTANEOUS at 20:48

## 2022-07-24 NOTE — P.PN
Subjective


Progress Note Date: 07/24/22








Roshan Mir, is a 71-year-old male who was admitted through emergency room 

due to alcohol intoxication, delirium, depression and suicidal ideation


Patient was admitted to telemetry floor he was started on CIWA protocol, he was 

admitted by mistake under the hospitalist group service.  Today he is being 

transferred to my service, patient is well known to my practice.


Patient is somnolent, responsive, stating he reportedly had a couple of beers, 

he is upset twice he was brought into the hospital , he is complaining of 

bilateral knee pain, denies any complaints at this time.





On 07/20/2022 patient was seen and examined on the telemetry floor he is alert 

and oriented 3 in no apparent distress, he is somnolent, with occasional 

episode of agitation, he is complaining of right knee pain otherwise he denies 

any complaints, there is no fever or chills no headache or dizziness no chest 

pain no shortness of breath no cough no nausea or vomiting no abdominal pain no 

diarrhea and no urinary symptoms





On 07/21/2022 patient was seen and examined on the medical floor he is alert and

oriented 3 he is more awake today there is less agitation he is able to 

tolerate diet well he is having difficulty standing up and walking physical 

therapy and occupational therapy are consulted patient is complaining of right 

knee pain there is some swelling in the right knee at this time otherwise there 

is no fever or chills no headache or dizziness no chest pain no shortness of 

breath no cough no nausea or vomiting no abdominal pain no diarrhea and no 

urinary symptoms





On 07/22/2022 patient was seen and examined on the medical floor he is alert and

oriented 3 in no apparent distress he is still complaining of severe pain and 

swelling in the right knee he is not able to bend his knee consultation for or

thopedic surgery was requested, otherwise patient denies any complaints there is

no fever or chills no headache or dizziness no chest pain no shortness of breath

no cough no nausea or vomiting no abdominal pain no diarrhea and no urinary 

symptoms





On 07/23/2022 patient was seen and examined on the medical floor he is alert and

oriented 3 in no apparent distress he is still complaining of severe pain and 

swelling in the right knee with difficulty standing and walking otherwise he 

denies any complaints there is no fever or chills no headache or dizziness no 

chest pain no shortness of breath no cough no nausea or vomiting no abdominal 

pain no diarrhea and no urinary symptoms.





On 07/24/2022 patient was seen and examined on the medical floor he is alert and

oriented 3 in no apparent distress, he is stating pain in the right knee is 

improving since yesterday however he is complaining now of pain in his left leg,

he is maintained on heparin subcu however from the chart it looks like he has 

been refusing the injections.  Otherwise he denies any complaints there is no 

fever or chills no headache or dizziness no chest pain no shortness of breath no

cough no nausea or vomiting no abdominal pain no diarrhea and no urinary 

symptoms.  At this time will continue with IV Solu-Medrol for the right knee 

pain, continue with Zyloprim, add insulin to sliding scale, will check left 

lower extremity venous Doppler, to rule out DVT, patient was counseled in regard

to taking subcu heparin.





Objective





- Vital Signs


Vital signs: 


                                   Vital Signs











Temp  97.1 F L  07/24/22 05:00


 


Pulse  74   07/24/22 05:00


 


Resp  18   07/24/22 05:00


 


BP  153/79   07/24/22 05:00


 


Pulse Ox  96   07/24/22 05:00


 


FiO2      








                                 Intake & Output











 07/23/22 07/24/22 07/24/22





 18:59 06:59 18:59


 


Intake Total  500 


 


Balance  500 


 


Intake:   


 


  Oral  500 


 


Other:   


 


  Voiding Method Bedside Commode Bedside Commode 





 Urinal Urinal 


 


  # Voids 1 3 














- Exam





In general patient is alert and oriented x 3 in no distress


HEENT head normocephalic and atraumatic


Neck is supple no JVD no goiter no lymphadenopathy no carotid bruit


Chest examination is clear to auscultation no crackles no wheezing


Cardiac exam reveals regular heart sounds S1 and S2 no gallops no murmurs


Abdomen is soft nontender no organomegaly with normal bowel sounds


Extremity exam reveals no edema no cyanosis or clubbing


Neurological examination reveals no gross focal deficits





- Labs


CBC & Chem 7: 


                                 07/23/22 05:52





                                 07/23/22 05:52


Labs: 


                  Abnormal Lab Results - Last 24 Hours (Table)











  07/23/22 07/23/22 07/24/22 Range/Units





  05:52 20:45 07:52 


 


Anion Gap  9.80 L    (10.00-18.00)  mmol/L


 


BUN  29.7 H    (9.0-27.0)  mg/dL


 


BUN/Creatinine Ratio  27.25 H    (12.00-20.00)  Ratio


 


POC Glucose (mg/dL)   168 H  194 H  ()  mg/dL


 


Albumin/Globulin Ratio  1.47 L    (1.60-3.17)  g/dL














Assessment and Plan


Plan: 








Acute alcohol intoxication on admission





Delirium and suicidal ideation on admission





Underlying history of depression with anxiety disorder





Underlying history of hypertension





Underlying history of paroxysmal atrial fibrillation not on anticoagulation due 

to high risk of falling





Known history of excessive alcohol use





Pain and swelling in the right knee likely related to acute gout, patient 

started on oral Zyloprim he was also started on IV Solu-Medrol today








At this time patient is admitted to telemetry floor he is maintained on CIWA 

protocol


He was evaluated by psychiatry and was approved for admission to the psychiatry 

unit when medically stable


For DVT prophylaxis patient is on subcu heparin


For GI prophylaxis Pepcid


Medication and labs were reviewed will follow in a.m.

## 2022-07-24 NOTE — US
EXAMINATION TYPE: US venous doppler duplex LE LT

 

DATE OF EXAM: 7/24/2022 9:16 AM

 

COMPARISON: NONE

 

CLINICAL HISTORY: pain left leg. Pain in left leg, no h/o dvt, has had multiple US's

 

SIDE PERFORMED: Left  

 

TECHNIQUE:  The lower extremity deep venous system is examined utilizing real time linear array sonog
blas with graded compression, doppler sonography and color-flow sonography.

 

VESSELS IMAGED:

Common Femoral Vein

Deep Femoral Vein

Greater Saphenous Vein *

Femoral Vein

Popliteal Vein

Small Saphenous Vein *

Proximal Calf Veins

(* superficial vessels)

 

 

Left Leg:  Negative for DVT

 

Grayscale, color doppler, spectral doppler imaging performed of the deep veins of the lower extremiti
es.  There is normal flow, compressibility, vascular waveforms.

 

IMPRESSION:  No evidence of extremity deep vein thrombosis.

## 2022-07-25 VITALS — RESPIRATION RATE: 18 BRPM

## 2022-07-25 LAB
ALBUMIN SERPL-MCNC: 4 G/DL (ref 3.8–4.9)
ALBUMIN/GLOB SERPL: 1.48 G/DL (ref 1.6–3.17)
ALP SERPL-CCNC: 90 U/L (ref 41–126)
ALT SERPL-CCNC: 75 U/L (ref 10–49)
ANION GAP SERPL CALC-SCNC: 10.6 MMOL/L (ref 10–18)
AST SERPL-CCNC: 70 U/L (ref 14–35)
BASOPHILS # BLD AUTO: 0.01 X 10*3/UL (ref 0–0.1)
BASOPHILS NFR BLD AUTO: 0.1 %
BUN SERPL-SCNC: 34.6 MG/DL (ref 9–27)
BUN/CREAT SERPL: 34.6 RATIO (ref 12–20)
CALCIUM SPEC-MCNC: 9 MG/DL (ref 8.7–10.3)
CHLORIDE SERPL-SCNC: 106 MMOL/L (ref 96–109)
CO2 SERPL-SCNC: 23.4 MMOL/L (ref 20–27.5)
EOSINOPHIL # BLD AUTO: 0 X 10*3/UL (ref 0.04–0.35)
EOSINOPHIL NFR BLD AUTO: 0 %
ERYTHROCYTE [DISTWIDTH] IN BLOOD BY AUTOMATED COUNT: 5.3 X 10*6/UL (ref 4.4–5.6)
ERYTHROCYTE [DISTWIDTH] IN BLOOD: 13.2 % (ref 11.5–14.5)
GLOBULIN SER CALC-MCNC: 2.7 G/DL (ref 1.6–3.3)
GLUCOSE BLD-MCNC: 169 MG/DL (ref 70–110)
GLUCOSE BLD-MCNC: 174 MG/DL (ref 70–110)
GLUCOSE BLD-MCNC: 180 MG/DL (ref 70–110)
GLUCOSE BLD-MCNC: 205 MG/DL (ref 70–110)
GLUCOSE SERPL-MCNC: 180 MG/DL (ref 70–110)
HCT VFR BLD AUTO: 48.4 % (ref 39.6–50)
HGB BLD-MCNC: 15.4 G/DL (ref 13–17)
IMM GRANULOCYTES BLD QL AUTO: 0.5 %
LYMPHOCYTES # SPEC AUTO: 0.77 X 10*3/UL (ref 0.9–5)
LYMPHOCYTES NFR SPEC AUTO: 8.4 %
MCH RBC QN AUTO: 29.1 PG (ref 27–32)
MCHC RBC AUTO-ENTMCNC: 31.8 G/DL (ref 32–37)
MCV RBC AUTO: 91.3 FL (ref 80–97)
MONOCYTES # BLD AUTO: 0.48 X 10*3/UL (ref 0.2–1)
MONOCYTES NFR BLD AUTO: 5.2 %
NEUTROPHILS # BLD AUTO: 7.85 X 10*3/UL (ref 1.8–7.7)
NEUTROPHILS NFR BLD AUTO: 85.8 %
NRBC BLD AUTO-RTO: 0 /100 WBCS (ref 0–0)
PLATELET # BLD AUTO: 201 X 10*3/UL (ref 140–440)
POTASSIUM SERPL-SCNC: 4.1 MMOL/L (ref 3.5–5.5)
PROT SERPL-MCNC: 6.7 G/DL (ref 6.2–8.2)
SODIUM SERPL-SCNC: 140 MMOL/L (ref 135–145)
WBC # BLD AUTO: 9.16 X 10*3/UL (ref 4.5–10)

## 2022-07-25 RX ADMIN — HEPARIN SODIUM SCH UNIT: 5000 INJECTION INTRAVENOUS; SUBCUTANEOUS at 20:39

## 2022-07-25 RX ADMIN — Medication SCH MG: at 17:42

## 2022-07-25 RX ADMIN — GABAPENTIN SCH MG: 100 CAPSULE ORAL at 21:58

## 2022-07-25 RX ADMIN — INSULIN ASPART SCH UNIT: 100 INJECTION, SOLUTION INTRAVENOUS; SUBCUTANEOUS at 12:26

## 2022-07-25 RX ADMIN — METHYLPREDNISOLONE SODIUM SUCCINATE SCH MG: 125 INJECTION, POWDER, FOR SOLUTION INTRAMUSCULAR; INTRAVENOUS at 05:32

## 2022-07-25 RX ADMIN — GABAPENTIN SCH MG: 100 CAPSULE ORAL at 07:51

## 2022-07-25 RX ADMIN — METHYLPREDNISOLONE SODIUM SUCCINATE SCH MG: 125 INJECTION, POWDER, FOR SOLUTION INTRAMUSCULAR; INTRAVENOUS at 12:26

## 2022-07-25 RX ADMIN — INSULIN ASPART SCH UNIT: 100 INJECTION, SOLUTION INTRAVENOUS; SUBCUTANEOUS at 17:42

## 2022-07-25 RX ADMIN — METHYLPREDNISOLONE SODIUM SUCCINATE SCH MG: 125 INJECTION, POWDER, FOR SOLUTION INTRAMUSCULAR; INTRAVENOUS at 17:42

## 2022-07-25 RX ADMIN — HEPARIN SODIUM SCH UNIT: 5000 INJECTION INTRAVENOUS; SUBCUTANEOUS at 07:51

## 2022-07-25 RX ADMIN — METHYLPREDNISOLONE SODIUM SUCCINATE SCH MG: 125 INJECTION, POWDER, FOR SOLUTION INTRAMUSCULAR; INTRAVENOUS at 23:40

## 2022-07-25 RX ADMIN — INSULIN ASPART SCH UNIT: 100 INJECTION, SOLUTION INTRAVENOUS; SUBCUTANEOUS at 20:39

## 2022-07-25 RX ADMIN — Medication SCH MG: at 07:51

## 2022-07-25 RX ADMIN — FAMOTIDINE SCH MG: 20 TABLET, FILM COATED ORAL at 07:51

## 2022-07-25 RX ADMIN — GABAPENTIN SCH MG: 100 CAPSULE ORAL at 16:31

## 2022-07-25 RX ADMIN — INSULIN ASPART SCH UNIT: 100 INJECTION, SOLUTION INTRAVENOUS; SUBCUTANEOUS at 07:51

## 2022-07-25 NOTE — P.PN
Subjective


Progress Note Date: 07/25/22








Roshan Mir, is a 71-year-old male who was admitted through emergency room 

due to alcohol intoxication, delirium, depression and suicidal ideation


Patient was admitted to telemetry floor he was started on CIWA protocol, he was 

admitted by mistake under the hospitalist group service.  Today he is being 

transferred to my service, patient is well known to my practice.


Patient is somnolent, responsive, stating he reportedly had a couple of beers, 

he is upset twice he was brought into the hospital , he is complaining of 

bilateral knee pain, denies any complaints at this time.





On 07/20/2022 patient was seen and examined on the telemetry floor he is alert 

and oriented 3 in no apparent distress, he is somnolent, with occasional 

episode of agitation, he is complaining of right knee pain otherwise he denies 

any complaints, there is no fever or chills no headache or dizziness no chest 

pain no shortness of breath no cough no nausea or vomiting no abdominal pain no 

diarrhea and no urinary symptoms





On 07/21/2022 patient was seen and examined on the medical floor he is alert and

oriented 3 he is more awake today there is less agitation he is able to 

tolerate diet well he is having difficulty standing up and walking physical 

therapy and occupational therapy are consulted patient is complaining of right 

knee pain there is some swelling in the right knee at this time otherwise there 

is no fever or chills no headache or dizziness no chest pain no shortness of 

breath no cough no nausea or vomiting no abdominal pain no diarrhea and no 

urinary symptoms





On 07/22/2022 patient was seen and examined on the medical floor he is alert and

oriented 3 in no apparent distress he is still complaining of severe pain and 

swelling in the right knee he is not able to bend his knee consultation for or

thopedic surgery was requested, otherwise patient denies any complaints there is

no fever or chills no headache or dizziness no chest pain no shortness of breath

no cough no nausea or vomiting no abdominal pain no diarrhea and no urinary 

symptoms





On 07/23/2022 patient was seen and examined on the medical floor he is alert and

oriented 3 in no apparent distress he is still complaining of severe pain and 

swelling in the right knee with difficulty standing and walking otherwise he 

denies any complaints there is no fever or chills no headache or dizziness no 

chest pain no shortness of breath no cough no nausea or vomiting no abdominal 

pain no diarrhea and no urinary symptoms.





On 07/24/2022 patient was seen and examined on the medical floor he is alert and

oriented 3 in no apparent distress, he is stating pain in the right knee is 

improving since yesterday however he is complaining now of pain in his left leg,

he is maintained on heparin subcu however from the chart it looks like he has 

been refusing the injections.  Otherwise he denies any complaints there is no 

fever or chills no headache or dizziness no chest pain no shortness of breath no

cough no nausea or vomiting no abdominal pain no diarrhea and no urinary 

symptoms.  At this time will continue with IV Solu-Medrol for the right knee 

pain, continue with Zyloprim, add insulin to sliding scale, will check left 

lower extremity venous Doppler, to rule out DVT, patient was counseled in regard

to taking subcu heparin.





On 07/25/2022 patient was seen and examined on the medical floor he is alert and

oriented 3 in no apparent distress he is still complaining of severe pain and 

swelling in the right knee with difficulty standing and walking otherwise he 

denies any complaints there is no fever or chills no headache or dizziness no 

chest pain no shortness of breath no cough no nausea or vomiting no abdominal 

pain no diarrhea and no urinary symptoms.  Patient has acute gout attack in the 

right knee, he is maintained on IV Solu-Medrol and told her Zyloprim, he is 

improving gradually possible discharge in the next 1-2 days continue physical 

therapy and occupational therapy








Objective





- Vital Signs


Vital signs: 


                                   Vital Signs











Temp  97.4 F L  07/25/22 11:50


 


Pulse  75   07/25/22 11:50


 


Resp  19   07/25/22 11:50


 


BP  154/86   07/25/22 11:50


 


Pulse Ox  97   07/25/22 11:50


 


FiO2      








                                 Intake & Output











 07/25/22 07/25/22 07/26/22





 06:59 18:59 06:59


 


Intake Total 120 1200 


 


Balance 120 1200 


 


Intake:   


 


  Oral 120 1200 


 


Other:   


 


  Voiding Method Bedside Commode  





 Urinal  


 


  # Voids 3 3 


 


  # Bowel Movements  1 














- Exam





In general patient is alert and oriented x 3 in no distress


HEENT head normocephalic and atraumatic


Neck is supple no JVD no goiter no lymphadenopathy no carotid bruit


Chest examination is clear to auscultation no crackles no wheezing


Cardiac exam reveals regular heart sounds S1 and S2 no gallops no murmurs


Abdomen is soft nontender no organomegaly with normal bowel sounds


Extremity exam reveals no edema no cyanosis or clubbing


Neurological examination reveals no gross focal deficits





- Labs


CBC & Chem 7: 


                                 07/25/22 06:17





                                 07/25/22 06:17


Labs: 


                  Abnormal Lab Results - Last 24 Hours (Table)











  07/24/22 07/25/22 07/25/22 Range/Units





  20:12 06:17 06:17 


 


MCHC   31.8 L   (32.0-37.0)  g/dL


 


Immature Gran #   0.05 H   (0.00-0.04)  X 10*3/uL


 


Neutrophils #   7.85 H   (1.80-7.70)  X 10*3/uL


 


Lymphocytes #   0.77 L   (0.90-5.00)  X 10*3/uL


 


Eosinophils #   0 L   (0.04-0.35)  X 10*3/uL


 


BUN    34.6 H  (9.0-27.0)  mg/dL


 


BUN/Creatinine Ratio    34.60 H  (12.00-20.00)  Ratio


 


Glucose    180 H  ()  mg/dL


 


POC Glucose (mg/dL)  203 H    ()  mg/dL


 


Total Bilirubin    0.20 L  (0.30-1.20)  mg/dL


 


AST    70 H  (14-35)  U/L


 


ALT    75 H  (10-49)  U/L


 


Albumin/Globulin Ratio    1.48 L  (1.60-3.17)  g/dL














  07/25/22 07/25/22 07/25/22 Range/Units





  07:27 11:51 17:15 


 


MCHC     (32.0-37.0)  g/dL


 


Immature Gran #     (0.00-0.04)  X 10*3/uL


 


Neutrophils #     (1.80-7.70)  X 10*3/uL


 


Lymphocytes #     (0.90-5.00)  X 10*3/uL


 


Eosinophils #     (0.04-0.35)  X 10*3/uL


 


BUN     (9.0-27.0)  mg/dL


 


BUN/Creatinine Ratio     (12.00-20.00)  Ratio


 


Glucose     ()  mg/dL


 


POC Glucose (mg/dL)  174 H  169 H  180 H  ()  mg/dL


 


Total Bilirubin     (0.30-1.20)  mg/dL


 


AST     (14-35)  U/L


 


ALT     (10-49)  U/L


 


Albumin/Globulin Ratio     (1.60-3.17)  g/dL














Assessment and Plan


Plan: 








Acute alcohol intoxication on admission





Delirium and suicidal ideation on admission





Underlying history of depression with anxiety disorder





Underlying history of hypertension





Underlying history of paroxysmal atrial fibrillation not on anticoagulation due 

to high risk of falling





Known history of excessive alcohol use





Pain and swelling in the right knee likely related to acute gout, patient 

started on oral Zyloprim he was also started on IV Solu-Medrol today








At this time patient is admitted to telemetry floor he is maintained on CIWA 

protocol


He was evaluated by psychiatry and was approved for admission to the psychiatry 

unit when medically stable


For DVT prophylaxis patient is on subcu heparin


For GI prophylaxis Pepcid


Medication and labs were reviewed will follow in a.m.

## 2022-07-26 VITALS — SYSTOLIC BLOOD PRESSURE: 160 MMHG | HEART RATE: 78 BPM | DIASTOLIC BLOOD PRESSURE: 85 MMHG | TEMPERATURE: 98.3 F

## 2022-07-26 LAB
GLUCOSE BLD-MCNC: 106 MG/DL (ref 70–110)
GLUCOSE BLD-MCNC: 161 MG/DL (ref 70–110)

## 2022-07-26 RX ADMIN — GABAPENTIN SCH MG: 100 CAPSULE ORAL at 08:37

## 2022-07-26 RX ADMIN — Medication SCH MG: at 08:37

## 2022-07-26 RX ADMIN — METHYLPREDNISOLONE SODIUM SUCCINATE SCH MG: 125 INJECTION, POWDER, FOR SOLUTION INTRAMUSCULAR; INTRAVENOUS at 12:43

## 2022-07-26 RX ADMIN — HEPARIN SODIUM SCH UNIT: 5000 INJECTION INTRAVENOUS; SUBCUTANEOUS at 08:38

## 2022-07-26 RX ADMIN — METHYLPREDNISOLONE SODIUM SUCCINATE SCH MG: 125 INJECTION, POWDER, FOR SOLUTION INTRAMUSCULAR; INTRAVENOUS at 05:46

## 2022-07-26 RX ADMIN — INSULIN ASPART SCH: 100 INJECTION, SOLUTION INTRAVENOUS; SUBCUTANEOUS at 12:43

## 2022-07-26 RX ADMIN — FAMOTIDINE SCH MG: 20 TABLET, FILM COATED ORAL at 08:37

## 2022-07-26 RX ADMIN — INSULIN ASPART SCH UNIT: 100 INJECTION, SOLUTION INTRAVENOUS; SUBCUTANEOUS at 08:37

## 2022-07-26 NOTE — P.DS
Providers


Date of admission: 


07/16/22 23:36





Expected date of discharge: 07/26/22


Attending physician: 


Rajinder Foreman





Consults: 





                                        





07/16/22 23:39


Consult Physician Routine 


   Consulting Provider: Noman Stokes


   Consult Reason/Comments: Depression, suicidal ideation, petition


   Do you want consulting provider notified?: Yes, Notify in am





07/21/22 17:03


Consult Physician Routine 


   Consulting Provider: Robi Arroyo


   Consult Reason/Comments: Right knee pain and swelling


   Do you want consulting provider notified?: Yes











Primary care physician: 


Stated None





Hospital Course: 








Diagnosis on discharge: 











Acute alcohol intoxication on admission





Delirium and suicidal ideation on admission





Underlying history of depression with anxiety disorder





Underlying history of hypertension





Underlying history of paroxysmal atrial fibrillation not on anticoagulation due 

to high risk of falling





Known history of excessive alcohol use





Pain and swelling in the right knee likely related to acute gout, patient 

started on oral Zyloprim he was also started on IV Solu-Medrol today









































Hospital course:








Roshan Mir, is a 71-year-old male who was admitted through emergency room 

due to alcohol intoxication, delirium, depression and suicidal ideation


Patient was admitted to telemetry floor he was started on CIWA protocol, he was 

admitted by mistake under the hospitalist group service.  Today he is being 

transferred to my service, patient is well known to my practice.


Patient is somnolent, responsive, stating he reportedly had a couple of beers, 

he is upset twice he was brought into the hospital , he is complaining of 

bilateral knee pain, denies any complaints at this time.





On 07/20/2022 patient was seen and examined on the telemetry floor he is alert 

and oriented 3 in no apparent distress, he is somnolent, with occasional 

episode of agitation, he is complaining of right knee pain otherwise he denies 

any complaints, there is no fever or chills no headache or dizziness no chest 

pain no shortness of breath no cough no nausea or vomiting no abdominal pain no 

diarrhea and no urinary symptoms





On 07/21/2022 patient was seen and examined on the medical floor he is alert and

oriented 3 he is more awake today there is less agitation he is able to 

tolerate diet well he is having difficulty standing up and walking physical th

erapy and occupational therapy are consulted patient is complaining of right 

knee pain there is some swelling in the right knee at this time otherwise there 

is no fever or chills no headache or dizziness no chest pain no shortness of 

breath no cough no nausea or vomiting no abdominal pain no diarrhea and no 

urinary symptoms





On 07/22/2022 patient was seen and examined on the medical floor he is alert and

oriented 3 in no apparent distress he is still complaining of severe pain and 

swelling in the right knee he is not able to bend his knee consultation for 

orthopedic surgery was requested, otherwise patient denies any complaints there 

is no fever or chills no headache or dizziness no chest pain no shortness of 

breath no cough no nausea or vomiting no abdominal pain no diarrhea and no urina

ry symptoms





On 07/23/2022 patient was seen and examined on the medical floor he is alert and

oriented 3 in no apparent distress he is still complaining of severe pain and 

swelling in the right knee with difficulty standing and walking otherwise he 

denies any complaints there is no fever or chills no headache or dizziness no 

chest pain no shortness of breath no cough no nausea or vomiting no abdominal 

pain no diarrhea and no urinary symptoms.





On 07/24/2022 patient was seen and examined on the medical floor he is alert and

oriented 3 in no apparent distress, he is stating pain in the right knee is 

improving since yesterday however he is complaining now of pain in his left leg,

he is maintained on heparin subcu however from the chart it looks like he has 

been refusing the injections.  Otherwise he denies any complaints there is no 

fever or chills no headache or dizziness no chest pain no shortness of breath no

cough no nausea or vomiting no abdominal pain no diarrhea and no urinary sy

mptoms.  At this time will continue with IV Solu-Medrol for the right knee pain,

continue with Zyloprim, add insulin to sliding scale, will check left lower 

extremity venous Doppler, to rule out DVT, patient was counseled in regard to 

taking subcu heparin.





On 07/25/2022 patient was seen and examined on the medical floor he is alert and

oriented 3 in no apparent distress he is still complaining of severe pain and 

swelling in the right knee with difficulty standing and walking otherwise he 

denies any complaints there is no fever or chills no headache or dizziness no 

chest pain no shortness of breath no cough no nausea or vomiting no abdominal 

pain no diarrhea and no urinary symptoms.  Patient has acute gout attack in the 

right knee, he is maintained on IV Solu-Medrol and told her Zyloprim, he is 

improving gradually possible discharge in the next 1-2 days continue physical 

therapy and occupational therapy





Patient Condition at Discharge: Stable





Plan - Discharge Summary


New Discharge Prescriptions: 


New


   carvediloL [Coreg*] 12.5 mg PO BID-W/MEALS #60 tab


   amLODIPine [Norvasc] 10 mg PO DAILY #30 tab


   Acetaminophen Tab [Tylenol] 650 mg PO Q6HR PRN  tab


     PRN Reason: Fever And/ Or Pain


   Famotidine [Pepcid] 20 mg PO DAILY #30 tab


   Thiamine [Vitamin B-1] 100 mg PO BID-W/MEALS #60 tab


   Gabapentin [Neurontin] 100 mg PO TID  cap


   allopurinoL [Zyloprim] 300 mg PO DAILY  tab





Discontinued


   Metoprolol Tartrate [Lopressor] 50 mg PO BID 30 Days #60 tab


Discharge Medication List





Acetaminophen Tab [Tylenol] 650 mg PO Q6HR PRN  tab 07/19/22 [Rx]


Famotidine [Pepcid] 20 mg PO DAILY #30 tab 07/19/22 [Rx]


Thiamine [Vitamin B-1] 100 mg PO BID-W/MEALS #60 tab 07/19/22 [Rx]


amLODIPine [Norvasc] 10 mg PO DAILY #30 tab 07/19/22 [Rx]


carvediloL [Coreg*] 12.5 mg PO BID-W/MEALS #60 tab 07/19/22 [Rx]


Gabapentin [Neurontin] 100 mg PO TID  cap 07/26/22 [Rx]


allopurinoL [Zyloprim] 300 mg PO DAILY  tab 07/26/22 [Rx]








Follow up Appointment(s)/Referral(s): 


[facility], [NON-STAFF] - 1 Week


None,Stated [Primary Care Provider] - 1-2 days


Discharge/Stand Alone Forms:  [facility], Who Do I Call?, Community 

Resources, Outpatient Counseling, Inp Substance Abuse Facilities, Personal Care 

Manager

## 2022-08-17 ENCOUNTER — HOSPITAL ENCOUNTER (INPATIENT)
Dept: HOSPITAL 47 - EC | Age: 72
LOS: 15 days | Discharge: HOME | DRG: 603 | End: 2022-09-01
Attending: INTERNAL MEDICINE | Admitting: INTERNAL MEDICINE
Payer: MEDICARE

## 2022-08-17 VITALS — BODY MASS INDEX: 31 KG/M2

## 2022-08-17 DIAGNOSIS — I48.0: ICD-10-CM

## 2022-08-17 DIAGNOSIS — Z20.822: ICD-10-CM

## 2022-08-17 DIAGNOSIS — R29.6: ICD-10-CM

## 2022-08-17 DIAGNOSIS — I10: ICD-10-CM

## 2022-08-17 DIAGNOSIS — Z82.49: ICD-10-CM

## 2022-08-17 DIAGNOSIS — H91.90: ICD-10-CM
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
## 2022-08-17 DIAGNOSIS — L97.509: ICD-10-CM

## 2022-08-17 DIAGNOSIS — L03.115: Primary | ICD-10-CM

## 2022-08-17 DIAGNOSIS — Z87.820: ICD-10-CM

## 2022-08-17 DIAGNOSIS — L97.319: ICD-10-CM

## 2022-08-17 DIAGNOSIS — Z82.5: ICD-10-CM

## 2022-08-17 DIAGNOSIS — Z75.1: ICD-10-CM

## 2022-08-17 DIAGNOSIS — R26.9: ICD-10-CM

## 2022-08-17 DIAGNOSIS — R45.1: ICD-10-CM

## 2022-08-17 DIAGNOSIS — Z79.899: ICD-10-CM

## 2022-08-17 DIAGNOSIS — B37.0: ICD-10-CM

## 2022-08-17 DIAGNOSIS — F10.239: ICD-10-CM
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
## 2022-08-17 DIAGNOSIS — F41.9: ICD-10-CM

## 2022-08-17 DIAGNOSIS — K21.9: ICD-10-CM

## 2022-08-17 DIAGNOSIS — Z71.41: ICD-10-CM

## 2022-08-17 DIAGNOSIS — Z87.11: ICD-10-CM

## 2022-08-17 DIAGNOSIS — F32.A: ICD-10-CM

## 2022-08-17 DIAGNOSIS — M10.9: ICD-10-CM

## 2022-08-17 DIAGNOSIS — F10.229: ICD-10-CM

## 2022-08-17 DIAGNOSIS — H93.13: ICD-10-CM

## 2022-08-17 DIAGNOSIS — Z91.81: ICD-10-CM

## 2022-08-17 LAB
ALBUMIN SERPL-MCNC: 3.9 G/DL (ref 3.5–5)
ALP SERPL-CCNC: 103 U/L (ref 38–126)
ALT SERPL-CCNC: 22 U/L (ref 4–49)
ANION GAP SERPL CALC-SCNC: 17 MMOL/L
AST SERPL-CCNC: 42 U/L (ref 17–59)
BASOPHILS # BLD AUTO: 0.1 K/UL (ref 0–0.2)
BASOPHILS NFR BLD AUTO: 1 %
BUN SERPL-SCNC: 9 MG/DL (ref 9–20)
CALCIUM SPEC-MCNC: 8.1 MG/DL (ref 8.4–10.2)
CHLORIDE SERPL-SCNC: 95 MMOL/L (ref 98–107)
CO2 SERPL-SCNC: 21 MMOL/L (ref 22–30)
EOSINOPHIL # BLD AUTO: 0.2 K/UL (ref 0–0.7)
EOSINOPHIL NFR BLD AUTO: 2 %
ERYTHROCYTE [DISTWIDTH] IN BLOOD BY AUTOMATED COUNT: 5.59 M/UL (ref 4.3–5.9)
ERYTHROCYTE [DISTWIDTH] IN BLOOD: 14.2 % (ref 11.5–15.5)
GGT SERPL-CCNC: 80 U/L (ref 15–73)
GLUCOSE SERPL-MCNC: 92 MG/DL (ref 74–99)
HCT VFR BLD AUTO: 50.6 % (ref 39–53)
HGB BLD-MCNC: 16.6 GM/DL (ref 13–17.5)
INR PPP: 1 (ref ?–1.2)
LYMPHOCYTES # SPEC AUTO: 1.6 K/UL (ref 1–4.8)
LYMPHOCYTES NFR SPEC AUTO: 22 %
MAGNESIUM SPEC-SCNC: 2 MG/DL (ref 1.6–2.3)
MCH RBC QN AUTO: 29.7 PG (ref 25–35)
MCHC RBC AUTO-ENTMCNC: 32.9 G/DL (ref 31–37)
MCV RBC AUTO: 90.4 FL (ref 80–100)
MONOCYTES # BLD AUTO: 0.5 K/UL (ref 0–1)
MONOCYTES NFR BLD AUTO: 7 %
NEUTROPHILS # BLD AUTO: 5 K/UL (ref 1.3–7.7)
NEUTROPHILS NFR BLD AUTO: 66 %
PLATELET # BLD AUTO: 84 K/UL (ref 150–450)
POTASSIUM SERPL-SCNC: 3.9 MMOL/L (ref 3.5–5.1)
PROT SERPL-MCNC: 6.3 G/DL (ref 6.3–8.2)
PT BLD: 10.5 SEC (ref 9–12)
SODIUM SERPL-SCNC: 133 MMOL/L (ref 137–145)
WBC # BLD AUTO: 7.5 K/UL (ref 3.8–10.6)

## 2022-08-17 PROCEDURE — 83735 ASSAY OF MAGNESIUM: CPT

## 2022-08-17 PROCEDURE — 96366 THER/PROPH/DIAG IV INF ADDON: CPT

## 2022-08-17 PROCEDURE — 71045 X-RAY EXAM CHEST 1 VIEW: CPT

## 2022-08-17 PROCEDURE — 93005 ELECTROCARDIOGRAM TRACING: CPT

## 2022-08-17 PROCEDURE — 84484 ASSAY OF TROPONIN QUANT: CPT

## 2022-08-17 PROCEDURE — 84132 ASSAY OF SERUM POTASSIUM: CPT

## 2022-08-17 PROCEDURE — 96365 THER/PROPH/DIAG IV INF INIT: CPT

## 2022-08-17 PROCEDURE — 87077 CULTURE AEROBIC IDENTIFY: CPT

## 2022-08-17 PROCEDURE — 82075 ASSAY OF BREATH ETHANOL: CPT

## 2022-08-17 PROCEDURE — 84100 ASSAY OF PHOSPHORUS: CPT

## 2022-08-17 PROCEDURE — 85025 COMPLETE CBC W/AUTO DIFF WBC: CPT

## 2022-08-17 PROCEDURE — 70450 CT HEAD/BRAIN W/O DYE: CPT

## 2022-08-17 PROCEDURE — 85610 PROTHROMBIN TIME: CPT

## 2022-08-17 PROCEDURE — 82140 ASSAY OF AMMONIA: CPT

## 2022-08-17 PROCEDURE — 87635 SARS-COV-2 COVID-19 AMP PRB: CPT

## 2022-08-17 PROCEDURE — 81003 URINALYSIS AUTO W/O SCOPE: CPT

## 2022-08-17 PROCEDURE — 96372 THER/PROPH/DIAG INJ SC/IM: CPT

## 2022-08-17 PROCEDURE — 87070 CULTURE OTHR SPECIMN AEROBIC: CPT

## 2022-08-17 PROCEDURE — 72125 CT NECK SPINE W/O DYE: CPT

## 2022-08-17 PROCEDURE — 99285 EMERGENCY DEPT VISIT HI MDM: CPT

## 2022-08-17 PROCEDURE — 80306 DRUG TEST PRSMV INSTRMNT: CPT

## 2022-08-17 PROCEDURE — 82977 ASSAY OF GGT: CPT

## 2022-08-17 PROCEDURE — 36415 COLL VENOUS BLD VENIPUNCTURE: CPT

## 2022-08-17 PROCEDURE — 87205 SMEAR GRAM STAIN: CPT

## 2022-08-17 PROCEDURE — 87186 SC STD MICRODIL/AGAR DIL: CPT

## 2022-08-17 PROCEDURE — 96368 THER/DIAG CONCURRENT INF: CPT

## 2022-08-17 PROCEDURE — 84550 ASSAY OF BLOOD/URIC ACID: CPT

## 2022-08-17 PROCEDURE — 80320 DRUG SCREEN QUANTALCOHOLS: CPT

## 2022-08-17 PROCEDURE — 80053 COMPREHEN METABOLIC PANEL: CPT
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
## 2022-08-17 NOTE — XR
EXAMINATION TYPE: XR chest 1V portable

 

DATE OF EXAM: 8/17/2022

 

COMPARISON: 4/4/2022

 

HISTORY: Altered mental status

 

TECHNIQUE:

 

FINDINGS: There is elevated right diaphragm. No heart failure seen. Lungs are clear of consolidation.
 There are no hilar masses.

 

IMPRESSION: Chronic elevation of the right diaphragm. No change compared to old exam. No acute lung d
isease.

## 2022-08-18 LAB
PH UR: 5 [PH] (ref 5–8)
SP GR UR: 1.01 (ref 1–1.03)
UROBILINOGEN UR QL STRIP: <2 MG/DL (ref ?–2)

## 2022-08-18 RX ADMIN — Medication SCH MG: at 17:49

## 2022-08-18 RX ADMIN — PANTOPRAZOLE SODIUM SCH MG: 40 INJECTION, POWDER, FOR SOLUTION INTRAVENOUS at 08:05

## 2022-08-18 RX ADMIN — HEPARIN SODIUM SCH UNIT: 5000 INJECTION INTRAVENOUS; SUBCUTANEOUS at 08:04

## 2022-08-18 RX ADMIN — LORAZEPAM PRN MG: 2 INJECTION INTRAMUSCULAR; INTRAVENOUS at 08:02

## 2022-08-18 RX ADMIN — GABAPENTIN SCH MG: 100 CAPSULE ORAL at 17:50

## 2022-08-18 RX ADMIN — Medication SCH: at 17:50

## 2022-08-18 RX ADMIN — LORAZEPAM PRN MG: 2 INJECTION INTRAMUSCULAR; INTRAVENOUS at 10:17

## 2022-08-18 RX ADMIN — FAMOTIDINE SCH MG: 20 TABLET, FILM COATED ORAL at 08:04

## 2022-08-18 RX ADMIN — GABAPENTIN SCH MG: 100 CAPSULE ORAL at 22:05

## 2022-08-18 RX ADMIN — HEPARIN SODIUM SCH UNIT: 5000 INJECTION INTRAVENOUS; SUBCUTANEOUS at 17:50

## 2022-08-18 RX ADMIN — LORAZEPAM PRN MG: 2 INJECTION INTRAMUSCULAR; INTRAVENOUS at 17:49

## 2022-08-18 RX ADMIN — GABAPENTIN SCH MG: 100 CAPSULE ORAL at 08:04

## 2022-08-18 RX ADMIN — ACETAMINOPHEN PRN MG: 325 TABLET, FILM COATED ORAL at 22:05
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
## 2022-08-18 NOTE — CT
EXAMINATION TYPE: CT brain oswaldo tinoco con

 

DATE OF EXAM: 8/18/2022

 

COMPARISON: 4/4/2022

 

HISTORY: Possible fall

 

CT DLP: 1526.7 mGycm

Automated exposure control for dose reduction was used.

 

There is cerebral cortical atrophy. There is hypodensity in the periventricular white matter. There i
s no mass effect nor midline shift. No sign of intracranial hemorrhage. The calvarium is intact.

 

The cervical vertebra have normal alignment. There is hypertrophic anterior bridging osteophyte forma
tion from C4 to C7. No compression fracture. Facet joints are intact.

 

IMPRESSION:

Spondylotic changes. No fracture.

 

Cerebral atrophy and chronic small vessel ischemia. No acute intracranial abnormality.

 

No change compared to old exam.

## 2022-08-18 NOTE — US
EXAMINATION TYPE: US venous doppler duplex LE RT

 

DATE OF EXAM: 8/18/2022 10:22 PM

 

COMPARISON: 4/5/22 bilateral lower extremity ultrasound

 

CLINICAL HISTORY: Right leg swelling. right leg swelling

 

SIDE PERFORMED: Right  

 

TECHNIQUE:  The lower extremity deep venous system is examined utilizing real time linear array sonog
blas with graded compression, doppler sonography and color-flow sonography.

 

VESSELS IMAGED:

Common Femoral Vein

Deep Femoral Vein

Greater Saphenous Vein *

Femoral Vein

Popliteal Vein

Small Saphenous Vein *

Proximal Calf Veins

(* superficial vessels)

 

 

 

Right Leg:  Negative for DVT

 

Grayscale, color doppler, spectral doppler imaging performed of the deep veins of the right lower ext
remity.  There is normal flow, compressibility, vascular waveforms.

 

 

IMPRESSION:  No ultrasound evidence for acute DVT in the right lower extremity.

## 2022-08-19 LAB
ALBUMIN SERPL-MCNC: 3.2 G/DL (ref 3.8–4.9)
ALBUMIN/GLOB SERPL: 1.67 G/DL (ref 1.6–3.17)
ALP SERPL-CCNC: 85 U/L (ref 41–126)
ALT SERPL-CCNC: 16 U/L (ref 10–49)
ANION GAP SERPL CALC-SCNC: 10.1 MMOL/L (ref 10–18)
AST SERPL-CCNC: 31 U/L (ref 14–35)
BASOPHILS # BLD AUTO: 0.03 X 10*3/UL (ref 0–0.1)
BASOPHILS NFR BLD AUTO: 0.7 %
BUN SERPL-SCNC: 11.5 MG/DL (ref 9–27)
BUN/CREAT SERPL: 11.95 RATIO (ref 12–20)
CALCIUM SPEC-MCNC: 7.5 MG/DL (ref 8.7–10.3)
CHLORIDE SERPL-SCNC: 107 MMOL/L (ref 96–109)
CO2 SERPL-SCNC: 21.4 MMOL/L (ref 20–27.5)
EOSINOPHIL # BLD AUTO: 0.1 X 10*3/UL (ref 0.04–0.35)
EOSINOPHIL NFR BLD AUTO: 2.2 %
ERYTHROCYTE [DISTWIDTH] IN BLOOD BY AUTOMATED COUNT: 4.21 X 10*6/UL (ref 4.4–5.6)
ERYTHROCYTE [DISTWIDTH] IN BLOOD: 14.6 % (ref 11.5–14.5)
GLOBULIN SER CALC-MCNC: 1.9 G/DL (ref 1.6–3.3)
GLUCOSE SERPL-MCNC: 124 MG/DL (ref 70–110)
HCT VFR BLD AUTO: 39.2 % (ref 39.6–50)
HGB BLD-MCNC: 12.8 G/DL (ref 13–17)
IMM GRANULOCYTES BLD QL AUTO: 0.4 %
LYMPHOCYTES # SPEC AUTO: 0.79 X 10*3/UL (ref 0.9–5)
LYMPHOCYTES NFR SPEC AUTO: 17.3 %
MCH RBC QN AUTO: 30.4 PG (ref 27–32)
MCHC RBC AUTO-ENTMCNC: 32.7 G/DL (ref 32–37)
MCV RBC AUTO: 93.1 FL (ref 80–97)
MONOCYTES # BLD AUTO: 0.54 X 10*3/UL (ref 0.2–1)
MONOCYTES NFR BLD AUTO: 11.8 %
NEUTROPHILS # BLD AUTO: 3.09 X 10*3/UL (ref 1.8–7.7)
NEUTROPHILS NFR BLD AUTO: 67.6 %
NRBC BLD AUTO-RTO: 0 /100 WBCS (ref 0–0)
PLATELET # BLD AUTO: 65 X 10*3/UL (ref 140–440)
PLATELETS.RETICULATED NFR BLD AUTO: 3 % (ref 1.1–6.1)
POTASSIUM SERPL-SCNC: 4.1 MMOL/L (ref 3.5–5.5)
PROT SERPL-MCNC: 5.1 G/DL (ref 6.2–8.2)
RBC MORPH BLD: NORMAL
SODIUM SERPL-SCNC: 139 MMOL/L (ref 135–145)
WBC # BLD AUTO: 4.57 X 10*3/UL (ref 4.5–10)

## 2022-08-19 RX ADMIN — FAMOTIDINE SCH MG: 20 TABLET, FILM COATED ORAL at 07:06

## 2022-08-19 RX ADMIN — GABAPENTIN SCH MG: 100 CAPSULE ORAL at 07:06

## 2022-08-19 RX ADMIN — HEPARIN SODIUM SCH UNIT: 5000 INJECTION INTRAVENOUS; SUBCUTANEOUS at 17:39

## 2022-08-19 RX ADMIN — HEPARIN SODIUM SCH UNIT: 5000 INJECTION INTRAVENOUS; SUBCUTANEOUS at 00:56

## 2022-08-19 RX ADMIN — Medication SCH MG: at 07:06

## 2022-08-19 RX ADMIN — LORAZEPAM PRN MG: 2 INJECTION INTRAMUSCULAR; INTRAVENOUS at 11:43

## 2022-08-19 RX ADMIN — GABAPENTIN SCH MG: 100 CAPSULE ORAL at 17:39

## 2022-08-19 RX ADMIN — Medication SCH MG: at 17:39

## 2022-08-19 RX ADMIN — GABAPENTIN SCH MG: 100 CAPSULE ORAL at 22:01

## 2022-08-19 RX ADMIN — Medication SCH: at 15:47

## 2022-08-19 RX ADMIN — Medication SCH: at 06:56

## 2022-08-19 RX ADMIN — PANTOPRAZOLE SODIUM SCH MG: 40 INJECTION, POWDER, FOR SOLUTION INTRAVENOUS at 08:39

## 2022-08-19 RX ADMIN — HEPARIN SODIUM SCH UNIT: 5000 INJECTION INTRAVENOUS; SUBCUTANEOUS at 07:06
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
## 2022-08-19 NOTE — P.PN
Subjective


Progress Note Date: 08/19/22








Roshan Mir, he is a 71-year-old male who presented to [facility] emergency room with a chief complaint of alcohol intoxication and 

confusion, his living condition were not sanitary, he had a large ulcer on the 

medial aspect of the right ankle.


He was evaluated in the emergency room vital examination on presentation 

revealed a temperature of 98 pulse 84 respiration 20 blood pressure 154/90 and 

pulse ox 96% on room air


Laboratory data reveals a white blood count of 7.5 hemoglobin 16.6 platelet 

count 84 sodium 133 potassium 3.9 chloride 95 CO2 21 BUN 9 creatinine 0.88 

troponin level was 0.0-3


Testing in the emergency room revealed computed tomography scan of the head and 

neck done in the emergency room revealed cerebral atrophy and chronic small 

vessel ischemia no acute intracranial abnormality no change compared to old 

exam, chest x-ray done in the emergency room revealed chronic elevation of the 

right diaphragmatic no change compared to old exam no acute lung disease, EKG 

done in the emergency room revealed supraventricular rhythm with possible 

lateral ischemia


Patient was admitted to medical floor for further evaluation and treatment


On review of systems patient is complaining of right foot and ankle pain 

otherwise he denies any complaints there is no fever or chills no headache or 

dizziness no chest pain no shortness of breath no cough no nausea or vomiting no

abdominal pain no diarrhea and no urinary symptoms.





On 08/19/2022 patient was seen and examined on the medical floor he is alert and

responsive in no apparent distress he is complaining of generalized joint pain 

mostly in bilateral knees and his hands joints, otherwise he denies any 

complaints there is no fever or chills no headache or dizziness no chest pain no

shortness of breath no cough no nausea or vomiting no abdominal pain no diarrhea

and no urinary symptoms.  At this point will check uric acid level will give 1 

dose of IV Toradol and continue to monitor closely.  Continue with IV cefazolin 

for lower extremity cellulitis awaiting input from infectious disease





Objective





- Vital Signs


Vital signs: 


                                   Vital Signs











Temp  98.9 F   08/19/22 08:00


 


Pulse  51 L  08/19/22 08:00


 


Resp  17   08/19/22 08:00


 


BP  160/71   08/19/22 08:00


 


Pulse Ox  94 L  08/19/22 08:00


 


FiO2      








                                 Intake & Output











 08/18/22 08/19/22 08/19/22





 18:59 06:59 18:59


 


Intake Total 1450  


 


Output Total 900 900 


 


Balance 550 -900 


 


Intake:   


 


  Intake, IV Titration 900  





  Amount   


 


    Sodium Chloride 0.9% 1, 900  





    000 ml @ 150 mls/hr IV .   





    Q6H41M ONE with Thiamine   





    100 mg with Folic Acid 1   





    mg Rx#:[id number]   


 


  Oral 550  


 


Output:   


 


  Urine 900 900 


 


Other:   


 


  Voiding Method External Catheter External Catheter External Catheter


 


  # Bowel Movements 2  














- Exam








In general patient is alert and oriented x 3 in no distress


HEENT head normocephalic and atraumatic


Neck is supple no JVD no goiter no lymphadenopathy no carotid bruit


Chest examination is clear to auscultation no crackles no wheezing


Cardiac exam reveals regular heart sounds S1 and S2 no gallops no murmurs


Abdomen is soft nontender no organomegaly with normal bowel sounds


Extremity exam reveals no edema no cyanosis or clubbing right foot erythema and 

medial right ankle scabbed ulcer


Neurological examination reveals no gross focal deficits





- Labs


CBC & Chem 7: 


                                 08/19/22 05:33





                                 08/19/22 05:33


Labs: 


                  Abnormal Lab Results - Last 24 Hours (Table)











  08/19/22 08/19/22 Range/Units





  05:33 05:33 


 


RBC  4.21 L   (4.40-5.60)  X 10*6/uL


 


Hgb  12.8 L   (13.0-17.0)  g/dL


 


Hct  39.2 L   (39.6-50.0)  %


 


RDW  14.6 H   (11.5-14.5)  %


 


Plt Count  65 L   (140-440)  X 10*3/uL


 


Plt Count Comment  DECREASED A   


 


Lymphocytes #  0.79 L   (0.90-5.00)  X 10*3/uL


 


BUN/Creatinine Ratio   11.95 L  (12.00-20.00)  Ratio


 


Glucose   124 H  ()  mg/dL


 


Calcium   7.5 L  (8.7-10.3)  mg/dL


 


Total Protein   5.1 L  (6.2-8.2)  g/dL


 


Albumin   3.2 L  (3.8-4.9)  g/dL














Assessment and Plan


Plan: 








Alcohol intoxication





Early alcohol withdrawal will





Right lower extremity cellulitis with 





Mental status changes with delirium





Underlying history of hypertension





Underlying history of paroxysmal atrial fibrillation not on anticoagulation due 

to multiple falls





Underlying history of depression with anxiety





Underlying history of excessive alcohol use patient was admitted recently with 

alcohol intoxication








At this time patient is admitted to medical floor he was started on CIWA 

protocol with IV Ativan


Will start IV cefazolin for right lower extremity cellulitis


Will check right lower extremity venous Doppler


Recheck labs in a.m.


Prognosis is guarded patient is not receptive to any counseling at this time 

regarding alcohol will try in the next few days

## 2022-08-20 LAB
ALBUMIN SERPL-MCNC: 3.2 G/DL (ref 3.8–4.9)
ALBUMIN/GLOB SERPL: 1.6 G/DL (ref 1.6–3.17)
ALP SERPL-CCNC: 76 U/L (ref 41–126)
ALT SERPL-CCNC: 12 U/L (ref 10–49)
ANION GAP SERPL CALC-SCNC: 9.8 MMOL/L (ref 10–18)
AST SERPL-CCNC: 19 U/L (ref 14–35)
BASOPHILS # BLD AUTO: 0.03 X 10*3/UL (ref 0–0.1)
BASOPHILS NFR BLD AUTO: 0.7 %
BUN SERPL-SCNC: 10 MG/DL (ref 9–27)
BUN/CREAT SERPL: 11.11 RATIO (ref 12–20)
CALCIUM SPEC-MCNC: 7.8 MG/DL (ref 8.7–10.3)
CHLORIDE SERPL-SCNC: 105 MMOL/L (ref 96–109)
CO2 SERPL-SCNC: 25.2 MMOL/L (ref 20–27.5)
EOSINOPHIL # BLD AUTO: 0.13 X 10*3/UL (ref 0.04–0.35)
EOSINOPHIL NFR BLD AUTO: 3.2 %
ERYTHROCYTE [DISTWIDTH] IN BLOOD BY AUTOMATED COUNT: 4.39 X 10*6/UL (ref 4.4–5.6)
ERYTHROCYTE [DISTWIDTH] IN BLOOD: 14 % (ref 11.5–14.5)
GLOBULIN SER CALC-MCNC: 2 G/DL (ref 1.6–3.3)
GLUCOSE SERPL-MCNC: 140 MG/DL (ref 70–110)
HCT VFR BLD AUTO: 40.6 % (ref 39.6–50)
HGB BLD-MCNC: 13.5 G/DL (ref 13–17)
IMM GRANULOCYTES BLD QL AUTO: 0.7 %
LYMPHOCYTES # SPEC AUTO: 1.02 X 10*3/UL (ref 0.9–5)
LYMPHOCYTES NFR SPEC AUTO: 24.8 %
MCH RBC QN AUTO: 30.8 PG (ref 27–32)
MCHC RBC AUTO-ENTMCNC: 33.3 G/DL (ref 32–37)
MCV RBC AUTO: 92.5 FL (ref 80–97)
MONOCYTES # BLD AUTO: 0.38 X 10*3/UL (ref 0.2–1)
MONOCYTES NFR BLD AUTO: 9.2 %
NEUTROPHILS # BLD AUTO: 2.52 X 10*3/UL (ref 1.8–7.7)
NEUTROPHILS NFR BLD AUTO: 61.4 %
NRBC BLD AUTO-RTO: 0 /100 WBCS (ref 0–0)
PLATELET # BLD AUTO: 55 X 10*3/UL (ref 140–440)
PLATELETS.RETICULATED NFR BLD AUTO: 5.1 % (ref 1.1–6.1)
POTASSIUM SERPL-SCNC: 3.3 MMOL/L (ref 3.5–5.5)
PROT SERPL-MCNC: 5.2 G/DL (ref 6.2–8.2)
SODIUM SERPL-SCNC: 140 MMOL/L (ref 135–145)
STOMATOCYTES BLD QL SMEAR: (no result)
WBC # BLD AUTO: 4.11 X 10*3/UL (ref 4.5–10)

## 2022-08-20 RX ADMIN — CEFEPIME HYDROCHLORIDE SCH MLS/HR: 2 INJECTION, POWDER, FOR SOLUTION INTRAVENOUS at 16:09

## 2022-08-20 RX ADMIN — Medication SCH: at 08:42

## 2022-08-20 RX ADMIN — GABAPENTIN SCH MG: 100 CAPSULE ORAL at 16:10

## 2022-08-20 RX ADMIN — Medication SCH MG: at 17:49

## 2022-08-20 RX ADMIN — PANTOPRAZOLE SODIUM SCH MG: 40 TABLET, DELAYED RELEASE ORAL at 08:44

## 2022-08-20 RX ADMIN — HEPARIN SODIUM SCH UNIT: 5000 INJECTION INTRAVENOUS; SUBCUTANEOUS at 02:00

## 2022-08-20 RX ADMIN — LORAZEPAM PRN MG: 2 INJECTION INTRAMUSCULAR; INTRAVENOUS at 16:10

## 2022-08-20 RX ADMIN — HEPARIN SODIUM SCH UNIT: 5000 INJECTION INTRAVENOUS; SUBCUTANEOUS at 08:43

## 2022-08-20 RX ADMIN — Medication SCH MG: at 08:44

## 2022-08-20 RX ADMIN — LORAZEPAM PRN MG: 2 INJECTION INTRAMUSCULAR; INTRAVENOUS at 09:27

## 2022-08-20 RX ADMIN — GABAPENTIN SCH MG: 100 CAPSULE ORAL at 08:44

## 2022-08-20 RX ADMIN — GABAPENTIN SCH: 100 CAPSULE ORAL at 21:58

## 2022-08-20 RX ADMIN — FAMOTIDINE SCH MG: 20 TABLET, FILM COATED ORAL at 08:44

## 2022-08-20 RX ADMIN — HEPARIN SODIUM SCH UNIT: 5000 INJECTION INTRAVENOUS; SUBCUTANEOUS at 16:09

## 2022-08-20 RX ADMIN — POTASSIUM CHLORIDE SCH MEQ: 20 TABLET, EXTENDED RELEASE ORAL at 14:01

## 2022-08-20 RX ADMIN — GABAPENTIN SCH MG: 100 CAPSULE ORAL at 21:40

## 2022-08-20 NOTE — P.PN
Subjective


Progress Note Date: 08/20/22








Roshan Mir, he is a 71-year-old male who presented to [facility] emergency room with a chief complaint of alcohol intoxication and 

confusion, his living condition were not sanitary, he had a large ulcer on the 

medial aspect of the right ankle.


He was evaluated in the emergency room vital examination on presentation 

revealed a temperature of 98 pulse 84 respiration 20 blood pressure 154/90 and 

pulse ox 96% on room air


Laboratory data reveals a white blood count of 7.5 hemoglobin 16.6 platelet 

count 84 sodium 133 potassium 3.9 chloride 95 CO2 21 BUN 9 creatinine 0.88 

troponin level was 0.0-3


Testing in the emergency room revealed computed tomography scan of the head and 

neck done in the emergency room revealed cerebral atrophy and chronic small 

vessel ischemia no acute intracranial abnormality no change compared to old 

exam, chest x-ray done in the emergency room revealed chronic elevation of the 

right diaphragmatic no change compared to old exam no acute lung disease, EKG 

done in the emergency room revealed supraventricular rhythm with possible 

lateral ischemia


Patient was admitted to medical floor for further evaluation and treatment


On review of systems patient is complaining of right foot and ankle pain 

otherwise he denies any complaints there is no fever or chills no headache or 

dizziness no chest pain no shortness of breath no cough no nausea or vomiting no

abdominal pain no diarrhea and no urinary symptoms.





On 08/19/2022 patient was seen and examined on the medical floor he is alert and

responsive in no apparent distress he is complaining of generalized joint pain 

mostly in bilateral knees and his hands joints, otherwise he denies any 

complaints there is no fever or chills no headache or dizziness no chest pain no

shortness of breath no cough no nausea or vomiting no abdominal pain no diarrhea

and no urinary symptoms.  At this point will check uric acid level will give 1 

dose of IV Toradol and continue to monitor closely.  Continue with IV cefazolin 

for lower extremity cellulitis awaiting input from infectious disease








On 08/20/2022 patient was seen and examined on the medical floor he is alert and

responsive in no apparent distress he is complaining of lower extremity pain, 

otherwise he denies any complaints there is no fever or chills no headache or 

dizziness no chest pain no shortness of breath no cough no nausea or vomiting no

abdominal pain no diarrhea and no urinary symptoms. Patient see n by Dr Baez, 

wound culture taken.  Continue with IV cefazolin for lower extremity cellulitis 

awaiting input from infectious disease.














Objective





- Vital Signs


Vital signs: 


                                   Vital Signs











Temp  98.2 F   08/20/22 07:47


 


Pulse  68   08/20/22 07:47


 


Resp  17   08/20/22 07:47


 


BP  155/71   08/20/22 07:47


 


Pulse Ox  93 L  08/20/22 07:47


 


FiO2      








                                 Intake & Output











 08/19/22 08/20/22 08/20/22





 18:59 06:59 18:59


 


Intake Total   50


 


Output Total 1375  


 


Balance -1375  50


 


Intake:   


 


  Intake, IV Titration   50





  Amount   


 


    ceFAZolin 2 gm In Sodium   50





    Chloride 0.9% 50 ml @ 100   





    mls/hr IVPB Q8HR [facility] Rx#   





    :[id number]   


 


Output:   


 


  Urine 1375  


 


Other:   


 


  Voiding Method External Catheter External Catheter External Catheter


 


  # Voids  4 


 


  # Bowel Movements  1 














- Exam








In general patient is alert and oriented x 3 in no distress


HEENT head normocephalic and atraumatic


Neck is supple no JVD no goiter no lymphadenopathy no carotid bruit


Chest examination is clear to auscultation no crackles no wheezing


Cardiac exam reveals regular heart sounds S1 and S2 no gallops no murmurs


Abdomen is soft nontender no organomegaly with normal bowel sounds


Extremity exam reveals no edema no cyanosis or clubbing right foot erythema and 

medial right ankle scabbed ulcer


Neurological examination reveals no gross focal deficits





- Labs


CBC & Chem 7: 


                                 08/20/22 06:20





                                 08/20/22 06:20


Labs: 


                  Abnormal Lab Results - Last 24 Hours (Table)











  08/20/22 08/20/22 Range/Units





  06:20 06:20 


 


WBC  4.11 L   (4.50-10.00)  X 10*3/uL


 


RBC  4.39 L   (4.40-5.60)  X 10*6/uL


 


Plt Count  55 L   (140-440)  X 10*3/uL


 


Plt Count Comment  DECREASED A   


 


Potassium   3.3 L  (3.5-5.5)  mmol/L


 


Anion Gap   9.80 L  (10.00-18.00)  mmol/L


 


BUN/Creatinine Ratio   11.11 L  (12.00-20.00)  Ratio


 


Glucose   140 H  ()  mg/dL


 


Calcium   7.8 L  (8.7-10.3)  mg/dL


 


Total Protein   5.2 L  (6.2-8.2)  g/dL


 


Albumin   3.2 L  (3.8-4.9)  g/dL








                      Microbiology - Last 24 Hours (Table)











 08/19/22 12:20 Gram Stain - Preliminary





 Foot - Right Wound Culture - Preliminary





    Gram Neg Bacilli














Assessment and Plan


Plan: 








Alcohol intoxication





Early alcohol withdrawal will





Right lower extremity cellulitis with 





Mental status changes with delirium





Underlying history of hypertension





Underlying history of paroxysmal atrial fibrillation not on anticoagulation due 

to multiple falls





Underlying history of depression with anxiety





Underlying history of excessive alcohol use patient was admitted recently with 

alcohol intoxication








At this time patient is admitted to medical floor he was started on CIWA 

protocol with IV Ativan


Will start IV cefazolin for right lower extremity cellulitis


Will check right lower extremity venous Doppler


Recheck labs in a.m.


Prognosis is guarded patient is not receptive to any counseling at this time 

regarding alcohol will try in the next few days

## 2022-08-20 NOTE — P.PN
Subjective


Progress Note Date: 08/20/22


Principal diagnosis: 





R leg wound and cellulitis


Patient is a 71-year-old  male presenting to the hospital with multiple

complaints including wound to the right lower extremity and concerning for 

secondary cellulitis.


On today's evaluation that is 8/20/2022, the patient denies having any fever or 

any chills, patient is still complaining of pain to the right lower extremity 

but denies any worsening denies any chest pain shortness of breath or cough no 

abdominal pain or diarrhea











Objective





- Vital Signs


Vital signs: 


                                   Vital Signs











Temp  98.2 F   08/20/22 07:47


 


Pulse  68   08/20/22 07:47


 


Resp  17   08/20/22 07:47


 


BP  155/71   08/20/22 07:47


 


Pulse Ox  93 L  08/20/22 07:47


 


FiO2      








                                 Intake & Output











 08/19/22 08/20/22 08/20/22





 18:59 06:59 18:59


 


Intake Total   50


 


Output Total 1375  


 


Balance -1375  50


 


Intake:   


 


  Intake, IV Titration   50





  Amount   


 


    ceFAZolin 2 gm In Sodium   50





    Chloride 0.9% 50 ml @ 100   





    mls/hr IVPB Q8HR [facility] Rx#   





    :[id number]   


 


Output:   


 


  Urine 1375  


 


Other:   


 


  Voiding Method External Catheter External Catheter External Catheter


 


  # Voids  4 


 


  # Bowel Movements  1 














- Exam


GENERAL DESCRIPTION: Elderly male lying in bed, no distress. No tachypnea or 

accessory muscle of respiration use.


 LUNGS: Unlabored breathing. Clear to auscultation anteriorly. No wheeze or 

crackle.


HEART: S1, S2, regular rate and rhythm. No loud murmur


ABDOMEN: Soft, no tenderness , guarding or rigidity, no organomegaly


EXTREMITIES right lower extremity wound is currently dressed no drainage on the 

dressing











- Labs


CBC & Chem 7: 


                                 08/20/22 06:20





                                 08/20/22 20:34


Labs: 


                  Abnormal Lab Results - Last 24 Hours (Table)











  08/20/22 08/20/22 Range/Units





  06:20 06:20 


 


WBC  4.11 L   (4.50-10.00)  X 10*3/uL


 


RBC  4.39 L   (4.40-5.60)  X 10*6/uL


 


Plt Count  55 L   (140-440)  X 10*3/uL


 


Plt Count Comment  DECREASED A   


 


Potassium   3.3 L  (3.5-5.5)  mmol/L


 


Anion Gap   9.80 L  (10.00-18.00)  mmol/L


 


BUN/Creatinine Ratio   11.11 L  (12.00-20.00)  Ratio


 


Glucose   140 H  ()  mg/dL


 


Calcium   7.8 L  (8.7-10.3)  mg/dL


 


Total Protein   5.2 L  (6.2-8.2)  g/dL


 


Albumin   3.2 L  (3.8-4.9)  g/dL








                      Microbiology - Last 24 Hours (Table)











 08/19/22 12:20 Gram Stain - Preliminary





 Foot - Right Wound Culture - Preliminary





    Gram Neg Bacilli














Assessment and Plan


(1) Cellulitis


Current Visit: No   Status: Acute   Code(s): L03.90 - CELLULITIS, UNSPECIFIED   

SNOMED Code(s): 539370308


   


Plan: 


1patient with right lower extremity wound and secondary cellulitis local wound 

cultures are currently growing gram-negative, we will discontinue cefazolin 

start the patient on cefepime 2 g every 8 hours adjusting antibiotic further on 

the basis of culture report


2-local wound care to continue with the Medihoney followed by moist dressing 

change daily








Time with Patient: Less than 30

## 2022-08-21 LAB
ALBUMIN SERPL-MCNC: 3.5 G/DL (ref 3.8–4.9)
ALBUMIN/GLOB SERPL: 1.54 G/DL (ref 1.6–3.17)
ALP SERPL-CCNC: 75 U/L (ref 41–126)
ALT SERPL-CCNC: 14 U/L (ref 10–49)
ANION GAP SERPL CALC-SCNC: 15.7 MMOL/L (ref 10–18)
AST SERPL-CCNC: 27 U/L (ref 14–35)
BASOPHILS # BLD AUTO: 0.04 X 10*3/UL (ref 0–0.1)
BASOPHILS NFR BLD AUTO: 0.7 %
BUN SERPL-SCNC: 11.9 MG/DL (ref 9–27)
BUN/CREAT SERPL: 13.11 RATIO (ref 12–20)
CALCIUM SPEC-MCNC: 8.2 MG/DL (ref 8.7–10.3)
CHLORIDE SERPL-SCNC: 100 MMOL/L (ref 96–109)
CO2 SERPL-SCNC: 20.9 MMOL/L (ref 20–27.5)
EOSINOPHIL # BLD AUTO: 0.12 X 10*3/UL (ref 0.04–0.35)
EOSINOPHIL NFR BLD AUTO: 2.1 %
ERYTHROCYTE [DISTWIDTH] IN BLOOD BY AUTOMATED COUNT: 4.7 X 10*6/UL (ref 4.4–5.6)
ERYTHROCYTE [DISTWIDTH] IN BLOOD: 13.7 % (ref 11.5–14.5)
GLOBULIN SER CALC-MCNC: 2.3 G/DL (ref 1.6–3.3)
GLUCOSE SERPL-MCNC: 87 MG/DL (ref 70–110)
HCT VFR BLD AUTO: 43.7 % (ref 39.6–50)
HGB BLD-MCNC: 14.3 G/DL (ref 13–17)
IMM GRANULOCYTES BLD QL AUTO: 0.9 %
LYMPHOCYTES # SPEC AUTO: 1.07 X 10*3/UL (ref 0.9–5)
LYMPHOCYTES NFR SPEC AUTO: 18.8 %
MCH RBC QN AUTO: 30.4 PG (ref 27–32)
MCHC RBC AUTO-ENTMCNC: 32.7 G/DL (ref 32–37)
MCV RBC AUTO: 93 FL (ref 80–97)
MONOCYTES # BLD AUTO: 0.73 X 10*3/UL (ref 0.2–1)
MONOCYTES NFR BLD AUTO: 12.8 %
NEUTROPHILS # BLD AUTO: 3.68 X 10*3/UL (ref 1.8–7.7)
NEUTROPHILS NFR BLD AUTO: 64.7 %
NRBC BLD AUTO-RTO: 0 /100 WBCS (ref 0–0)
PLATELET # BLD AUTO: 72 X 10*3/UL (ref 140–440)
PLATELETS.RETICULATED NFR BLD AUTO: 4.2 % (ref 1.1–6.1)
POTASSIUM SERPL-SCNC: 3.7 MMOL/L (ref 3.5–5.5)
PROT SERPL-MCNC: 5.7 G/DL (ref 6.2–8.2)
SODIUM SERPL-SCNC: 137 MMOL/L (ref 135–145)
WBC # BLD AUTO: 5.69 X 10*3/UL (ref 4.5–10)

## 2022-08-21 RX ADMIN — HEPARIN SODIUM SCH: 5000 INJECTION INTRAVENOUS; SUBCUTANEOUS at 22:47

## 2022-08-21 RX ADMIN — CEFEPIME HYDROCHLORIDE SCH MLS/HR: 2 INJECTION, POWDER, FOR SOLUTION INTRAVENOUS at 15:21

## 2022-08-21 RX ADMIN — CEFEPIME HYDROCHLORIDE SCH MLS/HR: 2 INJECTION, POWDER, FOR SOLUTION INTRAVENOUS at 22:47

## 2022-08-21 RX ADMIN — HEPARIN SODIUM SCH UNIT: 5000 INJECTION INTRAVENOUS; SUBCUTANEOUS at 07:44

## 2022-08-21 RX ADMIN — THERA TABS SCH EACH: TAB at 08:57

## 2022-08-21 RX ADMIN — SODIUM CHLORIDE SCH MLS/HR: 900 INJECTION, SOLUTION INTRAVENOUS at 20:19

## 2022-08-21 RX ADMIN — PANTOPRAZOLE SODIUM SCH MG: 40 TABLET, DELAYED RELEASE ORAL at 07:44

## 2022-08-21 RX ADMIN — GABAPENTIN SCH MG: 100 CAPSULE ORAL at 15:21

## 2022-08-21 RX ADMIN — GABAPENTIN SCH MG: 100 CAPSULE ORAL at 07:45

## 2022-08-21 RX ADMIN — FAMOTIDINE SCH MG: 20 TABLET, FILM COATED ORAL at 07:44

## 2022-08-21 RX ADMIN — LORAZEPAM PRN MG: 2 INJECTION INTRAMUSCULAR; INTRAVENOUS at 09:51

## 2022-08-21 RX ADMIN — CEFEPIME HYDROCHLORIDE SCH MLS/HR: 2 INJECTION, POWDER, FOR SOLUTION INTRAVENOUS at 08:57

## 2022-08-21 RX ADMIN — LORAZEPAM PRN MG: 2 INJECTION INTRAMUSCULAR; INTRAVENOUS at 17:36

## 2022-08-21 RX ADMIN — GABAPENTIN SCH: 100 CAPSULE ORAL at 20:21

## 2022-08-21 RX ADMIN — LORAZEPAM PRN MG: 2 INJECTION INTRAMUSCULAR; INTRAVENOUS at 20:30

## 2022-08-21 RX ADMIN — CEFEPIME HYDROCHLORIDE SCH MLS/HR: 2 INJECTION, POWDER, FOR SOLUTION INTRAVENOUS at 00:55

## 2022-08-21 RX ADMIN — HEPARIN SODIUM SCH UNIT: 5000 INJECTION INTRAVENOUS; SUBCUTANEOUS at 15:21

## 2022-08-21 RX ADMIN — SODIUM CHLORIDE SCH MLS/HR: 900 INJECTION, SOLUTION INTRAVENOUS at 07:45

## 2022-08-21 RX ADMIN — LORAZEPAM PRN MG: 2 INJECTION INTRAMUSCULAR; INTRAVENOUS at 05:33

## 2022-08-21 RX ADMIN — HEPARIN SODIUM SCH: 5000 INJECTION INTRAVENOUS; SUBCUTANEOUS at 00:31

## 2022-08-21 NOTE — P.PN
Subjective


Progress Note Date: 08/21/22








Roshan Mir, he is a 71-year-old male who presented to [facility] emergency room with a chief complaint of alcohol intoxication and 

confusion, his living condition were not sanitary, he had a large ulcer on the 

medial aspect of the right ankle.


He was evaluated in the emergency room vital examination on presentation 

revealed a temperature of 98 pulse 84 respiration 20 blood pressure 154/90 and 

pulse ox 96% on room air


Laboratory data reveals a white blood count of 7.5 hemoglobin 16.6 platelet 

count 84 sodium 133 potassium 3.9 chloride 95 CO2 21 BUN 9 creatinine 0.88 

troponin level was 0.0-3


Testing in the emergency room revealed computed tomography scan of the head and 

neck done in the emergency room revealed cerebral atrophy and chronic small 

vessel ischemia no acute intracranial abnormality no change compared to old 

exam, chest x-ray done in the emergency room revealed chronic elevation of the 

right diaphragmatic no change compared to old exam no acute lung disease, EKG 

done in the emergency room revealed supraventricular rhythm with possible 

lateral ischemia


Patient was admitted to medical floor for further evaluation and treatment


On review of systems patient is complaining of right foot and ankle pain 

otherwise he denies any complaints there is no fever or chills no headache or 

dizziness no chest pain no shortness of breath no cough no nausea or vomiting no

abdominal pain no diarrhea and no urinary symptoms.





On 08/19/2022 patient was seen and examined on the medical floor he is alert and

responsive in no apparent distress he is complaining of generalized joint pain 

mostly in bilateral knees and his hands joints, otherwise he denies any 

complaints there is no fever or chills no headache or dizziness no chest pain no

shortness of breath no cough no nausea or vomiting no abdominal pain no diarrhea

and no urinary symptoms.  At this point will check uric acid level will give 1 

dose of IV Toradol and continue to monitor closely.  Continue with IV cefazolin 

for lower extremity cellulitis awaiting input from infectious disease








On 08/20/2022 patient was seen and examined on the medical floor he is alert and

responsive in no apparent distress he is complaining of lower extremity pain, 

otherwise he denies any complaints there is no fever or chills no headache or 

dizziness no chest pain no shortness of breath no cough no nausea or vomiting no

abdominal pain no diarrhea and no urinary symptoms. Patient see n by Dr Baez, 

wound culture taken.  Continue with IV cefazolin for lower extremity cellulitis 

awaiting input from infectious disease.








On 08/21/2022 patient was seen and examined on the medical floor he is alert and

responsive in no apparent distress he was having some episodes of agitation last

night he is still receiving IV Ativan, patient is still receiving IV antibiotic 

for lower extremity cellulitis with ulcer otherwise patient denies any 

complaints there is no fever or chills no headache or dizziness no chest pain no

shortness of breath no cough no nausea or vomiting no abdominal pain no diarrhea

and no urinary symptoms





Objective





- Vital Signs


Vital signs: 


                                   Vital Signs











Temp  98.8 F   08/21/22 07:45


 


Pulse  71   08/21/22 07:45


 


Resp  18   08/21/22 07:45


 


BP  150/79   08/21/22 07:45


 


Pulse Ox  96   08/21/22 07:45


 


FiO2      








                                 Intake & Output











 08/20/22 08/21/22 08/21/22





 18:59 06:59 18:59


 


Intake Total 290  


 


Output Total 250 600 


 


Balance 40 -600 


 


Intake:   


 


  Intake, IV Titration 50  





  Amount   


 


    ceFAZolin 2 gm In Sodium 50  





    Chloride 0.9% 50 ml @ 100   





    mls/hr IVPB Q8HR [facility] Rx#   





    :[id number]   


 


  Oral 240  


 


Output:   


 


  Urine 250 600 


 


Other:   


 


  Voiding Method External Catheter External Catheter External Catheter


 


  # Voids 1 3 


 


  # Bowel Movements  0 














- Exam








In general patient is alert and oriented x 3 in no distress


HEENT head normocephalic and atraumatic


Neck is supple no JVD no goiter no lymphadenopathy no carotid bruit


Chest examination is clear to auscultation no crackles no wheezing


Cardiac exam reveals regular heart sounds S1 and S2 no gallops no murmurs


Abdomen is soft nontender no organomegaly with normal bowel sounds


Extremity exam reveals no edema no cyanosis or clubbing right foot erythema and 

medial right ankle scabbed ulcer


Neurological examination reveals no gross focal deficits





- Labs


CBC & Chem 7: 


                                 08/20/22 06:20





                                 08/20/22 20:34


Labs: 


                  Abnormal Lab Results - Last 24 Hours (Table)











  08/20/22 08/20/22 Range/Units





  06:20 06:20 


 


WBC  4.11 L   (4.50-10.00)  X 10*3/uL


 


RBC  4.39 L   (4.40-5.60)  X 10*6/uL


 


Plt Count  55 L   (140-440)  X 10*3/uL


 


Plt Count Comment  DECREASED A   


 


Potassium   3.3 L  (3.5-5.5)  mmol/L


 


Anion Gap   9.80 L  (10.00-18.00)  mmol/L


 


BUN/Creatinine Ratio   11.11 L  (12.00-20.00)  Ratio


 


Glucose   140 H  ()  mg/dL


 


Calcium   7.8 L  (8.7-10.3)  mg/dL


 


Total Protein   5.2 L  (6.2-8.2)  g/dL


 


Albumin   3.2 L  (3.8-4.9)  g/dL








                      Microbiology - Last 24 Hours (Table)











 08/19/22 12:20 Gram Stain - Preliminary





 Foot - Right Wound Culture - Preliminary





    Gram Neg Bacilli














Assessment and Plan


Plan: 








Alcohol intoxication





Early alcohol withdrawal will





Right lower extremity cellulitis with 





Mental status changes with delirium





Underlying history of hypertension





Underlying history of paroxysmal atrial fibrillation not on anticoagulation due 

to multiple falls





Underlying history of depression with anxiety





Underlying history of excessive alcohol use patient was admitted recently with 

alcohol intoxication








At this time patient is admitted to medical floor he was started on CIWA 

protocol with IV Ativan


Will start IV cefazolin for right lower extremity cellulitis


Will check right lower extremity venous Doppler


Recheck labs in a.m.


Prognosis is guarded patient is not receptive to any counseling at this time 

regarding alcohol will try in the next few days

## 2022-08-22 LAB
ALBUMIN SERPL-MCNC: 3.6 G/DL (ref 3.8–4.9)
ALBUMIN/GLOB SERPL: 1.5 G/DL (ref 1.6–3.17)
ALP SERPL-CCNC: 75 U/L (ref 41–126)
ALT SERPL-CCNC: 16 U/L (ref 10–49)
ANION GAP SERPL CALC-SCNC: 12.3 MMOL/L (ref 10–18)
AST SERPL-CCNC: 28 U/L (ref 14–35)
BASOPHILS # BLD AUTO: 0.04 X 10*3/UL (ref 0–0.1)
BASOPHILS NFR BLD AUTO: 0.7 %
BUN SERPL-SCNC: 18 MG/DL (ref 9–27)
BUN/CREAT SERPL: 20 RATIO (ref 12–20)
CALCIUM SPEC-MCNC: 8.3 MG/DL (ref 8.7–10.3)
CHLORIDE SERPL-SCNC: 103 MMOL/L (ref 96–109)
CO2 SERPL-SCNC: 22.7 MMOL/L (ref 20–27.5)
EOSINOPHIL # BLD AUTO: 0.16 X 10*3/UL (ref 0.04–0.35)
EOSINOPHIL NFR BLD AUTO: 2.9 %
ERYTHROCYTE [DISTWIDTH] IN BLOOD BY AUTOMATED COUNT: 4.69 X 10*6/UL (ref 4.4–5.6)
ERYTHROCYTE [DISTWIDTH] IN BLOOD: 14 % (ref 11.5–14.5)
GLOBULIN SER CALC-MCNC: 2.4 G/DL (ref 1.6–3.3)
GLUCOSE SERPL-MCNC: 117 MG/DL (ref 70–110)
HCT VFR BLD AUTO: 42.8 % (ref 39.6–50)
HGB BLD-MCNC: 14.4 G/DL (ref 13–17)
IMM GRANULOCYTES BLD QL AUTO: 0.7 %
LYMPHOCYTES # SPEC AUTO: 0.98 X 10*3/UL (ref 0.9–5)
LYMPHOCYTES NFR SPEC AUTO: 17.8 %
MCH RBC QN AUTO: 30.7 PG (ref 27–32)
MCHC RBC AUTO-ENTMCNC: 33.6 G/DL (ref 32–37)
MCV RBC AUTO: 91.3 FL (ref 80–97)
MONOCYTES # BLD AUTO: 0.65 X 10*3/UL (ref 0.2–1)
MONOCYTES NFR BLD AUTO: 11.8 %
NEUTROPHILS # BLD AUTO: 3.63 X 10*3/UL (ref 1.8–7.7)
NEUTROPHILS NFR BLD AUTO: 66.1 %
NRBC BLD AUTO-RTO: 0 /100 WBCS (ref 0–0)
PLATELET # BLD AUTO: 92 X 10*3/UL (ref 140–440)
POTASSIUM SERPL-SCNC: 3.8 MMOL/L (ref 3.5–5.5)
PROT SERPL-MCNC: 6 G/DL (ref 6.2–8.2)
SODIUM SERPL-SCNC: 138 MMOL/L (ref 135–145)
WBC # BLD AUTO: 5.5 X 10*3/UL (ref 4.5–10)

## 2022-08-22 RX ADMIN — GABAPENTIN SCH MG: 100 CAPSULE ORAL at 10:48

## 2022-08-22 RX ADMIN — GABAPENTIN SCH MG: 100 CAPSULE ORAL at 17:24

## 2022-08-22 RX ADMIN — SODIUM CHLORIDE SCH MLS/HR: 900 INJECTION, SOLUTION INTRAVENOUS at 22:05

## 2022-08-22 RX ADMIN — GABAPENTIN SCH MG: 100 CAPSULE ORAL at 20:07

## 2022-08-22 RX ADMIN — CEFEPIME HYDROCHLORIDE SCH MLS/HR: 2 INJECTION, POWDER, FOR SOLUTION INTRAVENOUS at 10:52

## 2022-08-22 RX ADMIN — CEFEPIME HYDROCHLORIDE SCH MLS/HR: 2 INJECTION, POWDER, FOR SOLUTION INTRAVENOUS at 17:24

## 2022-08-22 RX ADMIN — PANTOPRAZOLE SODIUM SCH MG: 40 TABLET, DELAYED RELEASE ORAL at 10:47

## 2022-08-22 RX ADMIN — SODIUM CHLORIDE SCH MLS/HR: 900 INJECTION, SOLUTION INTRAVENOUS at 10:48

## 2022-08-22 RX ADMIN — THERA TABS SCH EACH: TAB at 10:48

## 2022-08-22 RX ADMIN — HEPARIN SODIUM SCH UNIT: 5000 INJECTION INTRAVENOUS; SUBCUTANEOUS at 10:48

## 2022-08-22 RX ADMIN — FAMOTIDINE SCH MG: 20 TABLET, FILM COATED ORAL at 10:48

## 2022-08-22 RX ADMIN — HEPARIN SODIUM SCH UNIT: 5000 INJECTION INTRAVENOUS; SUBCUTANEOUS at 17:24

## 2022-08-22 NOTE — P.PN
Subjective


Progress Note Date: 08/21/22


Principal diagnosis: 





R leg wound and cellulitis


Patient is a 71-year-old  male presenting to the hospital with multiple

complaints including wound to the right lower extremity and concerning for 

secondary cellulitis.


On today's evaluation that is 8/21/2022, the patient remains to be febrile, 

patient pain to the right lower extremity has slightly decreased in intensity, 

the patient denies any chest pain shortness of breath or cough no abdominal pain

or diarrhea











Objective





- Vital Signs


Vital signs: 


                                   Vital Signs











Temp  98.8 F   08/21/22 07:45


 


Pulse  71   08/21/22 07:45


 


Resp  18   08/21/22 07:45


 


BP  150/79   08/21/22 07:45


 


Pulse Ox  96   08/21/22 07:45


 


FiO2      








                                 Intake & Output











 08/20/22 08/21/22 08/21/22





 18:59 06:59 18:59


 


Intake Total 290  


 


Output Total 250 600 


 


Balance 40 -600 


 


Intake:   


 


  Intake, IV Titration 50  





  Amount   


 


    ceFAZolin 2 gm In Sodium 50  





    Chloride 0.9% 50 ml @ 100   





    mls/hr IVPB Q8HR [facility] Rx#   





    :[id number]   


 


  Oral 240  


 


Output:   


 


  Urine 250 600 


 


Other:   


 


  Voiding Method External Catheter External Catheter External Catheter


 


  # Voids 1 3 


 


  # Bowel Movements  0 














- Exam


GENERAL DESCRIPTION: Elderly male lying in bed, no distress. No tachypnea or 

accessory muscle of respiration use.


 LUNGS: Unlabored breathing. Clear to auscultation anteriorly. No wheeze or 

crackle.


HEART: S1, S2, regular rate and rhythm. No loud murmur


ABDOMEN: Soft, no tenderness , guarding or rigidity, no organomegaly


EXTREMITIES right lower extremity wound is currently dressed no drainage on the 

dressing











- Labs


CBC & Chem 7: 


                                 08/21/22 06:33





                                 08/21/22 06:33


Labs: 


                  Abnormal Lab Results - Last 24 Hours (Table)











  08/21/22 08/21/22 Range/Units





  06:33 06:33 


 


Plt Count  72 L   (140-440)  X 10*3/uL


 


Plt Count Comment  DECREASED A   


 


Immature Gran #  0.05 H   (0.00-0.04)  X 10*3/uL


 


Calcium   8.2 L  (8.7-10.3)  mg/dL


 


Total Protein   5.7 L  (6.2-8.2)  g/dL


 


Albumin   3.5 L  (3.8-4.9)  g/dL


 


Albumin/Globulin Ratio   1.54 L  (1.60-3.17)  g/dL








                      Microbiology - Last 24 Hours (Table)











 08/19/22 12:20 Gram Stain - Preliminary





 Foot - Right Wound Culture - Preliminary





    Gram Neg Bacilli














Assessment and Plan


(1) Cellulitis


Current Visit: No   Status: Acute   Code(s): L03.90 - CELLULITIS, UNSPECIFIED   

SNOMED Code(s): 406555785


   


Plan: 


1patient with right lower extremity wound and secondary cellulitis local wound 

cultures are currently growing gram-negative with ID and sensitivities still 

pending, patient to continue with cefepime 2 g every 8 hours adjusting 

antibiotic further on the basis of culture report


2-local wound care to continue with the Medihoney followed by moist dressing 

change daily








Time with Patient: Less than 30

## 2022-08-22 NOTE — P.PN
Subjective


Progress Note Date: 08/22/22








Roshan Mir, he is a 71-year-old male who presented to [facility] emergency room with a chief complaint of alcohol intoxication and 

confusion, his living condition were not sanitary, he had a large ulcer on the 

medial aspect of the right ankle.


He was evaluated in the emergency room vital examination on presentation 

revealed a temperature of 98 pulse 84 respiration 20 blood pressure 154/90 and 

pulse ox 96% on room air


Laboratory data reveals a white blood count of 7.5 hemoglobin 16.6 platelet 

count 84 sodium 133 potassium 3.9 chloride 95 CO2 21 BUN 9 creatinine 0.88 

troponin level was 0.0-3


Testing in the emergency room revealed computed tomography scan of the head and 

neck done in the emergency room revealed cerebral atrophy and chronic small 

vessel ischemia no acute intracranial abnormality no change compared to old 

exam, chest x-ray done in the emergency room revealed chronic elevation of the 

right diaphragmatic no change compared to old exam no acute lung disease, EKG 

done in the emergency room revealed supraventricular rhythm with possible 

lateral ischemia


Patient was admitted to medical floor for further evaluation and treatment


On review of systems patient is complaining of right foot and ankle pain 

otherwise he denies any complaints there is no fever or chills no headache or 

dizziness no chest pain no shortness of breath no cough no nausea or vomiting no

abdominal pain no diarrhea and no urinary symptoms.





On 08/19/2022 patient was seen and examined on the medical floor he is alert and

responsive in no apparent distress he is complaining of generalized joint pain 

mostly in bilateral knees and his hands joints, otherwise he denies any 

complaints there is no fever or chills no headache or dizziness no chest pain no

shortness of breath no cough no nausea or vomiting no abdominal pain no diarrhea

and no urinary symptoms.  At this point will check uric acid level will give 1 

dose of IV Toradol and continue to monitor closely.  Continue with IV cefazolin 

for lower extremity cellulitis awaiting input from infectious disease








On 08/20/2022 patient was seen and examined on the medical floor he is alert and

responsive in no apparent distress he is complaining of lower extremity pain, 

otherwise he denies any complaints there is no fever or chills no headache or 

dizziness no chest pain no shortness of breath no cough no nausea or vomiting no

abdominal pain no diarrhea and no urinary symptoms. Patient see n by Dr Baez, 

wound culture taken.  Continue with IV cefazolin for lower extremity cellulitis 

awaiting input from infectious disease.








On 08/21/2022 patient was seen and examined on the medical floor he is alert and

responsive in no apparent distress he was having some episodes of agitation last

night he is still receiving IV Ativan, patient is still receiving IV antibiotic 

for lower extremity cellulitis with ulcer otherwise patient denies any 

complaints there is no fever or chills no headache or dizziness no chest pain no

shortness of breath no cough no nausea or vomiting no abdominal pain no diarrhea

and no urinary symptoms





On 08/22/2022 patient was seen and examined on the medical floor he is alert and

oriented 3 in no apparent distress there is no fever or chills no headache or 

dizziness no chest pain no shortness of breath no cough no nausea or vomiting no

abdominal pain no diarrhea and no urinary symptoms.  He is still complaining of 

bilateral lower extremity pain and difficulty standing and walking, physical the

rapy were consult patient may need to go to rehab after the acute admission





Objective





- Vital Signs


Vital signs: 


                                   Vital Signs











Temp  97.6 F   08/22/22 07:48


 


Pulse  64   08/22/22 07:48


 


Resp  18   08/22/22 07:48


 


BP  160/74   08/22/22 07:48


 


Pulse Ox  93 L  08/22/22 07:48


 


FiO2      








                                 Intake & Output











 08/21/22 08/22/22 08/22/22





 18:59 06:59 18:59


 


Intake Total 1180  


 


Output Total 300  


 


Balance 880  


 


Intake:   


 


  Intake, IV Titration 100  





  Amount   


 


    Thiamine 100 mg In Sodium 100  





    Chloride 0.9% 50 ml @   





    100 mls/hr IVPB Q12HR [facility]   





    Rx#:[id number]   


 


  Oral 1080  


 


Output:   


 


  Urine 300  


 


Other:   


 


  Voiding Method External Catheter Diaper Diaper





  Incontinent Incontinent


 


  # Voids 4 3 1


 


  # Bowel Movements  0 1














- Exam








In general patient is alert and oriented x 3 in no distress


HEENT head normocephalic and atraumatic


Neck is supple no JVD no goiter no lymphadenopathy no carotid bruit


Chest examination is clear to auscultation no crackles no wheezing


Cardiac exam reveals regular heart sounds S1 and S2 no gallops no murmurs


Abdomen is soft nontender no organomegaly with normal bowel sounds


Extremity exam reveals no edema no cyanosis or clubbing right foot erythema and 

medial right ankle scabbed ulcer


Neurological examination reveals no gross focal deficits





- Labs


CBC & Chem 7: 


                                 08/22/22 05:53





                                 08/22/22 05:53


Labs: 


                  Abnormal Lab Results - Last 24 Hours (Table)











  08/22/22 08/22/22 Range/Units





  05:53 05:53 


 


Plt Count  92 L   (140-440)  X 10*3/uL


 


Glucose   117 H  ()  mg/dL


 


Calcium   8.3 L  (8.7-10.3)  mg/dL


 


Total Protein   6.0 L  (6.2-8.2)  g/dL


 


Albumin   3.6 L  (3.8-4.9)  g/dL


 


Albumin/Globulin Ratio   1.50 L  (1.60-3.17)  g/dL








                      Microbiology - Last 24 Hours (Table)











 08/19/22 12:20 Gram Stain - Preliminary





 Foot - Right Wound Culture - Preliminary





    Acinetobacter lwoffi





    Morganella morganii














Assessment and Plan


Plan: 








Alcohol intoxication





Early alcohol withdrawal will





Right lower extremity cellulitis with 





Mental status changes with delirium





Underlying history of hypertension





Underlying history of paroxysmal atrial fibrillation not on anticoagulation due 

to multiple falls





Underlying history of depression with anxiety





Underlying history of excessive alcohol use patient was admitted recently with 

alcohol intoxication








At this time patient is admitted to medical floor he was started on CIWA 

protocol with IV Ativan


Will start IV cefazolin for right lower extremity cellulitis


Will check right lower extremity venous Doppler


Recheck labs in a.m.


Prognosis is guarded patient is not receptive to any counseling at this time 

regarding alcohol will try in the next few days

## 2022-08-23 LAB
ALBUMIN SERPL-MCNC: 3.5 G/DL (ref 3.8–4.9)
ALBUMIN/GLOB SERPL: 1.35 G/DL (ref 1.6–3.17)
ALP SERPL-CCNC: 79 U/L (ref 41–126)
ALT SERPL-CCNC: 21 U/L (ref 10–49)
ANION GAP SERPL CALC-SCNC: 14.1 MMOL/L (ref 10–18)
AST SERPL-CCNC: 30 U/L (ref 14–35)
BASOPHILS # BLD AUTO: 0.04 X 10*3/UL (ref 0–0.1)
BASOPHILS NFR BLD AUTO: 0.8 %
BUN SERPL-SCNC: 24 MG/DL (ref 9–27)
BUN/CREAT SERPL: 26.67 RATIO (ref 12–20)
CALCIUM SPEC-MCNC: 8.4 MG/DL (ref 8.7–10.3)
CHLORIDE SERPL-SCNC: 114 MMOL/L (ref 96–109)
CO2 SERPL-SCNC: 21.9 MMOL/L (ref 20–27.5)
EOSINOPHIL # BLD AUTO: 0.17 X 10*3/UL (ref 0.04–0.35)
EOSINOPHIL NFR BLD AUTO: 3.3 %
ERYTHROCYTE [DISTWIDTH] IN BLOOD BY AUTOMATED COUNT: 4.66 X 10*6/UL (ref 4.4–5.6)
ERYTHROCYTE [DISTWIDTH] IN BLOOD: 14.2 % (ref 11.5–14.5)
GLOBULIN SER CALC-MCNC: 2.6 G/DL (ref 1.6–3.3)
GLUCOSE SERPL-MCNC: 119 MG/DL (ref 70–110)
HCT VFR BLD AUTO: 43.7 % (ref 39.6–50)
HGB BLD-MCNC: 14.2 G/DL (ref 13–17)
IMM GRANULOCYTES BLD QL AUTO: 0.6 %
LYMPHOCYTES # SPEC AUTO: 0.98 X 10*3/UL (ref 0.9–5)
LYMPHOCYTES NFR SPEC AUTO: 18.9 %
MCH RBC QN AUTO: 30.5 PG (ref 27–32)
MCHC RBC AUTO-ENTMCNC: 32.5 G/DL (ref 32–37)
MCV RBC AUTO: 93.8 FL (ref 80–97)
MONOCYTES # BLD AUTO: 0.82 X 10*3/UL (ref 0.2–1)
MONOCYTES NFR BLD AUTO: 15.8 %
NEUTROPHILS # BLD AUTO: 3.15 X 10*3/UL (ref 1.8–7.7)
NEUTROPHILS NFR BLD AUTO: 60.6 %
NRBC BLD AUTO-RTO: 0 /100 WBCS (ref 0–0)
PLATELET # BLD AUTO: 126 X 10*3/UL (ref 140–440)
POTASSIUM SERPL-SCNC: 4 MMOL/L (ref 3.5–5.5)
PROT SERPL-MCNC: 6.1 G/DL (ref 6.2–8.2)
SODIUM SERPL-SCNC: 150 MMOL/L (ref 135–145)
WBC # BLD AUTO: 5.19 X 10*3/UL (ref 4.5–10)

## 2022-08-23 RX ADMIN — GABAPENTIN SCH MG: 100 CAPSULE ORAL at 21:36

## 2022-08-23 RX ADMIN — HEPARIN SODIUM SCH UNIT: 5000 INJECTION INTRAVENOUS; SUBCUTANEOUS at 01:11

## 2022-08-23 RX ADMIN — PANTOPRAZOLE SODIUM SCH MG: 40 TABLET, DELAYED RELEASE ORAL at 09:03

## 2022-08-23 RX ADMIN — SODIUM CHLORIDE SCH MLS/HR: 900 INJECTION, SOLUTION INTRAVENOUS at 23:37

## 2022-08-23 RX ADMIN — GABAPENTIN SCH MG: 100 CAPSULE ORAL at 18:42

## 2022-08-23 RX ADMIN — CEFEPIME HYDROCHLORIDE SCH MLS/HR: 2 INJECTION, POWDER, FOR SOLUTION INTRAVENOUS at 09:00

## 2022-08-23 RX ADMIN — PANTOPRAZOLE SODIUM SCH: 40 TABLET, DELAYED RELEASE ORAL at 09:02

## 2022-08-23 RX ADMIN — FAMOTIDINE SCH MG: 20 TABLET, FILM COATED ORAL at 09:01

## 2022-08-23 RX ADMIN — SODIUM CHLORIDE SCH MLS/HR: 900 INJECTION, SOLUTION INTRAVENOUS at 09:02

## 2022-08-23 RX ADMIN — HEPARIN SODIUM SCH UNIT: 5000 INJECTION INTRAVENOUS; SUBCUTANEOUS at 09:01

## 2022-08-23 RX ADMIN — HEPARIN SODIUM SCH UNIT: 5000 INJECTION INTRAVENOUS; SUBCUTANEOUS at 18:41

## 2022-08-23 RX ADMIN — CEFEPIME HYDROCHLORIDE SCH MLS/HR: 2 INJECTION, POWDER, FOR SOLUTION INTRAVENOUS at 01:12

## 2022-08-23 RX ADMIN — CEFEPIME HYDROCHLORIDE SCH MLS/HR: 2 INJECTION, POWDER, FOR SOLUTION INTRAVENOUS at 18:42

## 2022-08-23 RX ADMIN — THERA TABS SCH EACH: TAB at 09:01

## 2022-08-23 RX ADMIN — GABAPENTIN SCH MG: 100 CAPSULE ORAL at 09:01

## 2022-08-23 NOTE — P.PN
Subjective


Progress Note Date: 08/23/22


Principal diagnosis: 





R leg wound and cellulitis


Patient is a 71-year-old  male presenting to the hospital with multiple

complaints including wound to the right lower extremity and concerning for 

secondary cellulitis.


On today's evaluation that is 8/23/2022, the patient is febrile, patient was 

sleepy and lethargic today and did not answer any question, no vomiting diarrhea

or any other changes reported by the nursing staff





Objective





- Vital Signs


Vital signs: 


                                   Vital Signs











Temp  99.6 F   08/23/22 08:00


 


Pulse  74   08/23/22 08:00


 


Resp  17   08/23/22 08:00


 


BP  128/68   08/23/22 08:00


 


Pulse Ox  94 L  08/23/22 08:00


 


FiO2      








                                 Intake & Output











 08/22/22 08/23/22 08/23/22





 18:59 06:59 18:59


 


Weight   95.254 kg


 


Other:   


 


  Voiding Method Diaper Diaper Urinal





 Incontinent Incontinent 


 


  # Voids 1 3 


 


  # Bowel Movements 1  














- Exam


GENERAL DESCRIPTION: Elderly male lying in bed, no distress. No tachypnea or 

accessory muscle of respiration use.


 LUNGS: Unlabored breathing. Clear to auscultation anteriorly. No wheeze or 

crackle.


HEART: S1, S2, regular rate and rhythm. No loud murmur


ABDOMEN: Soft, no tenderness , guarding or rigidity, no organomegaly


EXTREMITIES right lower extremity wound is currently dressed no drainage on the 

dressing











- Labs


CBC & Chem 7: 


                                 08/23/22 05:46





                                 08/23/22 05:46


Labs: 


                  Abnormal Lab Results - Last 24 Hours (Table)











  08/23/22 08/23/22 Range/Units





  05:46 05:46 


 


Plt Count  126 L   (140-440)  X 10*3/uL


 


Sodium   150 H  (135-145)  mmol/L


 


Chloride   114 H  ()  mmol/L


 


BUN/Creatinine Ratio   26.67 H  (12.00-20.00)  Ratio


 


Glucose   119 H  ()  mg/dL


 


Calcium   8.4 L  (8.7-10.3)  mg/dL


 


Total Protein   6.1 L  (6.2-8.2)  g/dL


 


Albumin   3.5 L  (3.8-4.9)  g/dL


 


Albumin/Globulin Ratio   1.35 L  (1.60-3.17)  g/dL








                      Microbiology - Last 24 Hours (Table)











 08/19/22 12:20 Gram Stain - Preliminary





 Foot - Right Wound Culture - Preliminary





    Acinetobacter lwoffi





    Morganella morganii





    Acinetobacter som/haemol





    Presumptive Staph aureus














Assessment and Plan


(1) Cellulitis


Current Visit: No   Status: Acute   Code(s): L03.90 - CELLULITIS, UNSPECIFIED   

SNOMED Code(s): 487024362


   


Plan: 


1patient with right lower extremity wound and secondary cellulitis local wound 

cultures are currently growing multiple pathogens including Acinetobacter 

Morganella which is sensitive to cefepime which will be continued on inpatient 

with the plan to finish therapy with oral antibiotics


2-local wound care to continue with the Medihoney followed by moist dressing 

change daily








Time with Patient: Less than 30

## 2022-08-23 NOTE — P.PN
Subjective


Progress Note Date: 08/22/22


Principal diagnosis: 





R leg wound and cellulitis


Patient is a 71-year-old  male presenting to the hospital with multiple

complaints including wound to the right lower extremity and concerning for 

secondary cellulitis.


On today's evaluation that is 8/22/2022, the patient continues to be febrile, 

patient pain to the right lower extremity has decreased in intensity, the pa

tient denies any chest pain shortness of breath or cough no abdominal pain and 

no diarrhea has been reported





Objective





- Vital Signs


Vital signs: 


                                   Vital Signs











Temp  97.6 F   08/22/22 07:48


 


Pulse  64   08/22/22 07:48


 


Resp  18   08/22/22 07:48


 


BP  160/74   08/22/22 07:48


 


Pulse Ox  93 L  08/22/22 07:48


 


FiO2      








                                 Intake & Output











 08/21/22 08/22/22 08/22/22





 18:59 06:59 18:59


 


Intake Total 1180  


 


Output Total 300  


 


Balance 880  


 


Intake:   


 


  Intake, IV Titration 100  





  Amount   


 


    Thiamine 100 mg In Sodium 100  





    Chloride 0.9% 50 ml @   





    100 mls/hr IVPB Q12HR [facility]   





    Rx#:[id number]   


 


  Oral 1080  


 


Output:   


 


  Urine 300  


 


Other:   


 


  Voiding Method External Catheter Diaper Diaper





  Incontinent Incontinent


 


  # Voids 4 3 1


 


  # Bowel Movements  0 1














- Exam


GENERAL DESCRIPTION: Elderly male lying in bed, no distress. No tachypnea or 

accessory muscle of respiration use.


 LUNGS: Unlabored breathing. Clear to auscultation anteriorly. No wheeze or 

crackle.


HEART: S1, S2, regular rate and rhythm. No loud murmur


ABDOMEN: Soft, no tenderness , guarding or rigidity, no organomegaly


EXTREMITIES right lower extremity wound is currently dressed no drainage on the 

dressing











- Labs


CBC & Chem 7: 


                                 08/23/22 05:46





                                 08/23/22 05:46


Labs: 


                  Abnormal Lab Results - Last 24 Hours (Table)











  08/22/22 08/22/22 Range/Units





  05:53 05:53 


 


Plt Count  92 L   (140-440)  X 10*3/uL


 


Glucose   117 H  ()  mg/dL


 


Calcium   8.3 L  (8.7-10.3)  mg/dL


 


Total Protein   6.0 L  (6.2-8.2)  g/dL


 


Albumin   3.6 L  (3.8-4.9)  g/dL


 


Albumin/Globulin Ratio   1.50 L  (1.60-3.17)  g/dL








                      Microbiology - Last 24 Hours (Table)











 08/19/22 12:20 Gram Stain - Preliminary





 Foot - Right Wound Culture - Preliminary





    Acinetobacter lwoffi





    Morganella morganii














Assessment and Plan


(1) Cellulitis


Current Visit: No   Status: Acute   Code(s): L03.90 - CELLULITIS, UNSPECIFIED   

SNOMED Code(s): 104694770


   


Plan: 


1patient with right lower extremity wound and secondary cellulitis local wound 

cultures are currently growing multiple gram-negative which is sensitive to 

cefepime which will be continued hopefully finishing therapy with oral 

antibiotics


2-local wound care to continue with the Medihoney followed by moist dressing 

change daily








Time with Patient: Less than 30

## 2022-08-24 LAB
ALBUMIN SERPL-MCNC: 3.5 G/DL (ref 3.8–4.9)
ALBUMIN/GLOB SERPL: 1.46 G/DL (ref 1.6–3.17)
ALP SERPL-CCNC: 80 U/L (ref 41–126)
ALT SERPL-CCNC: 22 U/L (ref 10–49)
ANION GAP SERPL CALC-SCNC: 11.6 MMOL/L (ref 10–18)
AST SERPL-CCNC: 28 U/L (ref 14–35)
BASOPHILS # BLD AUTO: 0.05 X 10*3/UL (ref 0–0.1)
BASOPHILS NFR BLD AUTO: 1 %
BUN SERPL-SCNC: 24.1 MG/DL (ref 9–27)
BUN/CREAT SERPL: 26.78 RATIO (ref 12–20)
CALCIUM SPEC-MCNC: 8.5 MG/DL (ref 8.7–10.3)
CHLORIDE SERPL-SCNC: 107 MMOL/L (ref 96–109)
CO2 SERPL-SCNC: 21.4 MMOL/L (ref 20–27.5)
EOSINOPHIL # BLD AUTO: 0.15 X 10*3/UL (ref 0.04–0.35)
EOSINOPHIL NFR BLD AUTO: 3 %
ERYTHROCYTE [DISTWIDTH] IN BLOOD BY AUTOMATED COUNT: 4.5 X 10*6/UL (ref 4.4–5.6)
ERYTHROCYTE [DISTWIDTH] IN BLOOD: 14.2 % (ref 11.5–14.5)
GLOBULIN SER CALC-MCNC: 2.4 G/DL (ref 1.6–3.3)
GLUCOSE SERPL-MCNC: 103 MG/DL (ref 70–110)
HCT VFR BLD AUTO: 42.7 % (ref 39.6–50)
HGB BLD-MCNC: 13.8 G/DL (ref 13–17)
IMM GRANULOCYTES BLD QL AUTO: 1 %
LYMPHOCYTES # SPEC AUTO: 1.14 X 10*3/UL (ref 0.9–5)
LYMPHOCYTES NFR SPEC AUTO: 22.6 %
MCH RBC QN AUTO: 30.7 PG (ref 27–32)
MCHC RBC AUTO-ENTMCNC: 32.3 G/DL (ref 32–37)
MCV RBC AUTO: 94.9 FL (ref 80–97)
MONOCYTES # BLD AUTO: 0.74 X 10*3/UL (ref 0.2–1)
MONOCYTES NFR BLD AUTO: 14.7 %
NEUTROPHILS # BLD AUTO: 2.91 X 10*3/UL (ref 1.8–7.7)
NEUTROPHILS NFR BLD AUTO: 57.7 %
NRBC BLD AUTO-RTO: 0 /100 WBCS (ref 0–0)
PLATELET # BLD AUTO: 172 X 10*3/UL (ref 140–440)
POTASSIUM SERPL-SCNC: 4.3 MMOL/L (ref 3.5–5.5)
PROT SERPL-MCNC: 5.9 G/DL (ref 6.2–8.2)
SODIUM SERPL-SCNC: 140 MMOL/L (ref 135–145)
WBC # BLD AUTO: 5.04 X 10*3/UL (ref 4.5–10)

## 2022-08-24 RX ADMIN — HEPARIN SODIUM SCH UNIT: 5000 INJECTION INTRAVENOUS; SUBCUTANEOUS at 00:48

## 2022-08-24 RX ADMIN — SODIUM CHLORIDE SCH MLS/HR: 900 INJECTION, SOLUTION INTRAVENOUS at 22:35

## 2022-08-24 RX ADMIN — PANTOPRAZOLE SODIUM SCH MG: 40 TABLET, DELAYED RELEASE ORAL at 08:03

## 2022-08-24 RX ADMIN — CEFEPIME HYDROCHLORIDE SCH MLS/HR: 2 INJECTION, POWDER, FOR SOLUTION INTRAVENOUS at 00:48

## 2022-08-24 RX ADMIN — FAMOTIDINE SCH MG: 20 TABLET, FILM COATED ORAL at 08:03

## 2022-08-24 RX ADMIN — GABAPENTIN SCH MG: 100 CAPSULE ORAL at 20:14

## 2022-08-24 RX ADMIN — CEFEPIME HYDROCHLORIDE SCH MLS/HR: 2 INJECTION, POWDER, FOR SOLUTION INTRAVENOUS at 16:41

## 2022-08-24 RX ADMIN — THERA TABS SCH EACH: TAB at 08:04

## 2022-08-24 RX ADMIN — HEPARIN SODIUM SCH UNIT: 5000 INJECTION INTRAVENOUS; SUBCUTANEOUS at 08:03

## 2022-08-24 RX ADMIN — SODIUM CHLORIDE SCH MLS/HR: 900 INJECTION, SOLUTION INTRAVENOUS at 12:58

## 2022-08-24 RX ADMIN — CEFEPIME HYDROCHLORIDE SCH MLS/HR: 2 INJECTION, POWDER, FOR SOLUTION INTRAVENOUS at 08:04

## 2022-08-24 RX ADMIN — HEPARIN SODIUM SCH UNIT: 5000 INJECTION INTRAVENOUS; SUBCUTANEOUS at 16:41

## 2022-08-24 RX ADMIN — GABAPENTIN SCH MG: 100 CAPSULE ORAL at 08:03

## 2022-08-24 RX ADMIN — GABAPENTIN SCH MG: 100 CAPSULE ORAL at 16:41

## 2022-08-24 NOTE — P.PN
Subjective


Progress Note Date: 08/23/22








Roshan Mir, he is a 71-year-old male who presented to [facility] emergency room with a chief complaint of alcohol intoxication and 

confusion, his living condition were not sanitary, he had a large ulcer on the 

medial aspect of the right ankle.


He was evaluated in the emergency room vital examination on presentation 

revealed a temperature of 98 pulse 84 respiration 20 blood pressure 154/90 and 

pulse ox 96% on room air


Laboratory data reveals a white blood count of 7.5 hemoglobin 16.6 platelet 

count 84 sodium 133 potassium 3.9 chloride 95 CO2 21 BUN 9 creatinine 0.88 

troponin level was 0.0-3


Testing in the emergency room revealed computed tomography scan of the head and 

neck done in the emergency room revealed cerebral atrophy and chronic small 

vessel ischemia no acute intracranial abnormality no change compared to old 

exam, chest x-ray done in the emergency room revealed chronic elevation of the 

right diaphragmatic no change compared to old exam no acute lung disease, EKG 

done in the emergency room revealed supraventricular rhythm with possible 

lateral ischemia


Patient was admitted to medical floor for further evaluation and treatment


On review of systems patient is complaining of right foot and ankle pain 

otherwise he denies any complaints there is no fever or chills no headache or 

dizziness no chest pain no shortness of breath no cough no nausea or vomiting no

abdominal pain no diarrhea and no urinary symptoms.





On 08/19/2022 patient was seen and examined on the medical floor he is alert and

responsive in no apparent distress he is complaining of generalized joint pain 

mostly in bilateral knees and his hands joints, otherwise he denies any 

complaints there is no fever or chills no headache or dizziness no chest pain no

shortness of breath no cough no nausea or vomiting no abdominal pain no diarrhea

and no urinary symptoms.  At this point will check uric acid level will give 1 

dose of IV Toradol and continue to monitor closely.  Continue with IV cefazolin 

for lower extremity cellulitis awaiting input from infectious disease








On 08/20/2022 patient was seen and examined on the medical floor he is alert and

responsive in no apparent distress he is complaining of lower extremity pain, 

otherwise he denies any complaints there is no fever or chills no headache or 

dizziness no chest pain no shortness of breath no cough no nausea or vomiting no

abdominal pain no diarrhea and no urinary symptoms. Patient see n by Dr Baez, 

wound culture taken.  Continue with IV cefazolin for lower extremity cellulitis 

awaiting input from infectious disease.








On 08/21/2022 patient was seen and examined on the medical floor he is alert and

responsive in no apparent distress he was having some episodes of agitation last

night he is still receiving IV Ativan, patient is still receiving IV antibiotic 

for lower extremity cellulitis with ulcer otherwise patient denies any 

complaints there is no fever or chills no headache or dizziness no chest pain no

shortness of breath no cough no nausea or vomiting no abdominal pain no diarrhea

and no urinary symptoms





On 08/22/2022 patient was seen and examined on the medical floor he is alert and

oriented 3 in no apparent distress there is no fever or chills no headache or 

dizziness no chest pain no shortness of breath no cough no nausea or vomiting no

abdominal pain no diarrhea and no urinary symptoms.  He is still complaining of 

bilateral lower extremity pain and difficulty standing and walking, physical the

rapy were consult patient may need to go to rehab after the acute admission








On 08/23/2022 patient was seen and examined on the medical floor he is alert and

oriented 3 in no apparent distress, he is still complaining of lower extremity 

pain and difficulty walking otherwise he denies any complaints there is no fever

or chills no headache or dizziness no chest pain no shortness of breath no cough

no nausea or vomiting no abdominal pain no diarrhea and no urinary symptoms.  

physical therapy were consult patient may need to go to rehab after the acute 

admission





Objective





- Vital Signs


Vital signs: 


                                   Vital Signs











Temp  97.9 F   08/23/22 01:51


 


Pulse  77   08/23/22 01:51


 


Resp  18   08/23/22 01:51


 


BP  125/66   08/23/22 01:51


 


Pulse Ox  95   08/23/22 01:51


 


FiO2      








                                 Intake & Output











 08/22/22 08/23/22 08/23/22





 18:59 06:59 18:59


 


Other:   


 


  Voiding Method Diaper Diaper 





 Incontinent Incontinent 


 


  # Voids 1 3 


 


  # Bowel Movements 1  














- Exam








In general patient is alert and oriented x 3 in no distress


HEENT head normocephalic and atraumatic


Neck is supple no JVD no goiter no lymphadenopathy no carotid bruit


Chest examination is clear to auscultation no crackles no wheezing


Cardiac exam reveals regular heart sounds S1 and S2 no gallops no murmurs


Abdomen is soft nontender no organomegaly with normal bowel sounds


Extremity exam reveals no edema no cyanosis or clubbing right foot erythema and 

medial right ankle scabbed ulcer


Neurological examination reveals no gross focal deficits





- Labs


CBC & Chem 7: 


                                 08/24/22 07:11





                                 08/24/22 07:11


Labs: 


                  Abnormal Lab Results - Last 24 Hours (Table)











  08/22/22 08/22/22 Range/Units





  05:53 05:53 


 


Plt Count  92 L   (140-440)  X 10*3/uL


 


Glucose   117 H  ()  mg/dL


 


Calcium   8.3 L  (8.7-10.3)  mg/dL


 


Total Protein   6.0 L  (6.2-8.2)  g/dL


 


Albumin   3.6 L  (3.8-4.9)  g/dL


 


Albumin/Globulin Ratio   1.50 L  (1.60-3.17)  g/dL








                      Microbiology - Last 24 Hours (Table)











 08/19/22 12:20 Gram Stain - Preliminary





 Foot - Right Wound Culture - Preliminary





    Acinetobacter lwoffi





    Morganella morganii





    Presumptive Staph aureus














Assessment and Plan


Plan: 








Alcohol intoxication





Early alcohol withdrawal will





Right lower extremity cellulitis with 





Mental status changes with delirium





Underlying history of hypertension





Underlying history of paroxysmal atrial fibrillation not on anticoagulation due 

to multiple falls





Underlying history of depression with anxiety





Underlying history of excessive alcohol use patient was admitted recently with 

alcohol intoxication








At this time patient is admitted to medical floor he was started on CIWA 

protocol with IV Ativan


Will start IV cefazolin for right lower extremity cellulitis


Will check right lower extremity venous Doppler


Recheck labs in a.m.


Prognosis is guarded patient is not receptive to any counseling at this time 

regarding alcohol will try in the next few days

## 2022-08-24 NOTE — P.PN
Subjective


Progress Note Date: 08/24/22








Roshan Mir, he is a 71-year-old male who presented to [facility] emergency room with a chief complaint of alcohol intoxication and 

confusion, his living condition were not sanitary, he had a large ulcer on the 

medial aspect of the right ankle.


He was evaluated in the emergency room vital examination on presentation 

revealed a temperature of 98 pulse 84 respiration 20 blood pressure 154/90 and 

pulse ox 96% on room air


Laboratory data reveals a white blood count of 7.5 hemoglobin 16.6 platelet 

count 84 sodium 133 potassium 3.9 chloride 95 CO2 21 BUN 9 creatinine 0.88 

troponin level was 0.0-3


Testing in the emergency room revealed computed tomography scan of the head and 

neck done in the emergency room revealed cerebral atrophy and chronic small 

vessel ischemia no acute intracranial abnormality no change compared to old 

exam, chest x-ray done in the emergency room revealed chronic elevation of the 

right diaphragmatic no change compared to old exam no acute lung disease, EKG 

done in the emergency room revealed supraventricular rhythm with possible 

lateral ischemia


Patient was admitted to medical floor for further evaluation and treatment


On review of systems patient is complaining of right foot and ankle pain 

otherwise he denies any complaints there is no fever or chills no headache or 

dizziness no chest pain no shortness of breath no cough no nausea or vomiting no

abdominal pain no diarrhea and no urinary symptoms.





On 08/19/2022 patient was seen and examined on the medical floor he is alert and

responsive in no apparent distress he is complaining of generalized joint pain 

mostly in bilateral knees and his hands joints, otherwise he denies any 

complaints there is no fever or chills no headache or dizziness no chest pain no

shortness of breath no cough no nausea or vomiting no abdominal pain no diarrhea

and no urinary symptoms.  At this point will check uric acid level will give 1 

dose of IV Toradol and continue to monitor closely.  Continue with IV cefazolin 

for lower extremity cellulitis awaiting input from infectious disease








On 08/20/2022 patient was seen and examined on the medical floor he is alert and

responsive in no apparent distress he is complaining of lower extremity pain, 

otherwise he denies any complaints there is no fever or chills no headache or 

dizziness no chest pain no shortness of breath no cough no nausea or vomiting no

abdominal pain no diarrhea and no urinary symptoms. Patient see n by Dr Baez, 

wound culture taken.  Continue with IV cefazolin for lower extremity cellulitis 

awaiting input from infectious disease.








On 08/21/2022 patient was seen and examined on the medical floor he is alert and

responsive in no apparent distress he was having some episodes of agitation last

night he is still receiving IV Ativan, patient is still receiving IV antibiotic 

for lower extremity cellulitis with ulcer otherwise patient denies any 

complaints there is no fever or chills no headache or dizziness no chest pain no

shortness of breath no cough no nausea or vomiting no abdominal pain no diarrhea

and no urinary symptoms





On 08/22/2022 patient was seen and examined on the medical floor he is alert and

oriented 3 in no apparent distress there is no fever or chills no headache or 

dizziness no chest pain no shortness of breath no cough no nausea or vomiting no

abdominal pain no diarrhea and no urinary symptoms.  He is still complaining of 

bilateral lower extremity pain and difficulty standing and walking, physical the

rapy were consult patient may need to go to rehab after the acute admission








On 08/23/2022 patient was seen and examined on the medical floor he is alert and

oriented 3 in no apparent distress, he is still complaining of lower extremity 

pain and difficulty walking otherwise he denies any complaints there is no fever

or chills no headache or dizziness no chest pain no shortness of breath no cough

no nausea or vomiting no abdominal pain no diarrhea and no urinary symptoms.  

physical therapy were consult patient may need to go to rehab after the acute 

admission





On 08/24/2022 patient was seen and examined on the medical floor he is alert and

oriented 3 in no distress he is complaining of ankle pain and difficulty 

standing and walking otherwise he denies any complaints there is no fever or 

chills no headache or dizziness no chest pain no shortness of breath no cough no

nausea or vomiting no abdominal pain no diarrhea no blood in the stools no 

burning with urination no frequency or urgency no hematuria he is followed by 

physical therapy he would need to be transferred to a rehab unit when stable





Objective





- Vital Signs


Vital signs: 


                                   Vital Signs











Temp  98.0 F   08/24/22 14:00


 


Pulse  63   08/24/22 14:00


 


Resp  16   08/24/22 14:00


 


BP  118/65   08/24/22 14:00


 


Pulse Ox  97   08/24/22 14:00


 


FiO2      








                                 Intake & Output











 08/23/22 08/24/22 08/24/22





 18:59 06:59 18:59


 


Intake Total 


 


Output Total 720  850


 


Balance -420 300 230


 


Weight 95.254 kg  


 


Intake:   


 


  Intake, IV Titration 300  





  Amount   


 


    Cefepime 2 gm In Sodium 200  





    Chloride 0.9% 100 ml @ 25   





    mls/hr IVPB Q8HR [facility] Rx#   





    :[id number]   


 


    Thiamine 100 mg In Sodium 100  





    Chloride 0.9% 50 ml @   





    100 mls/hr IVPB Q12HR [facility]   





    Rx#:[id number]   


 


  Oral  300 1080


 


Output:   


 


  Urine 720  850


 


Other:   


 


  Voiding Method Urinal Urinal 


 


  # Voids  4 


 


  # Bowel Movements  1 1














- Exam








In general patient is alert and oriented x 3 in no distress


HEENT head normocephalic and atraumatic


Neck is supple no JVD no goiter no lymphadenopathy no carotid bruit


Chest examination is clear to auscultation no crackles no wheezing


Cardiac exam reveals regular heart sounds S1 and S2 no gallops no murmurs


Abdomen is soft nontender no organomegaly with normal bowel sounds


Extremity exam reveals no edema no cyanosis or clubbing right foot erythema and 

medial right ankle scabbed ulcer


Neurological examination reveals no gross focal deficits





- Labs


CBC & Chem 7: 


                                 08/24/22 07:11





                                 08/24/22 07:11


Labs: 


                  Abnormal Lab Results - Last 24 Hours (Table)











  08/24/22 08/24/22 Range/Units





  07:11 07:11 


 


Immature Gran #  0.05 H   (0.00-0.04)  X 10*3/uL


 


BUN/Creatinine Ratio   26.78 H  (12.00-20.00)  Ratio


 


Calcium   8.5 L  (8.7-10.3)  mg/dL


 


Total Protein   5.9 L  (6.2-8.2)  g/dL


 


Albumin   3.5 L  (3.8-4.9)  g/dL


 


Albumin/Globulin Ratio   1.46 L  (1.60-3.17)  g/dL








                      Microbiology - Last 24 Hours (Table)











 08/19/22 12:20 Gram Stain - Final





 Foot - Right Wound Culture - Final





    Acinetobacter lwoffi





    Morganella morganii





    Acinetobacter som/haemol





    Staphylococcus aureus














Assessment and Plan


Plan: 








Alcohol intoxication





Early alcohol withdrawal will





Right lower extremity cellulitis with 





Mental status changes with delirium





Underlying history of hypertension





Underlying history of paroxysmal atrial fibrillation not on anticoagulation due 

to multiple falls





Underlying history of depression with anxiety





Underlying history of excessive alcohol use patient was admitted recently with 

alcohol intoxication








At this time patient is admitted to medical floor he was started on CIWA 

protocol with IV Ativan


Will start IV cefazolin for right lower extremity cellulitis


Will check right lower extremity venous Doppler


Recheck labs in a.m.


Prognosis is guarded patient is not receptive to any counseling at this time 

regarding alcohol will try in the next few days

## 2022-08-25 RX ADMIN — CEFEPIME HYDROCHLORIDE SCH MLS/HR: 2 INJECTION, POWDER, FOR SOLUTION INTRAVENOUS at 00:52

## 2022-08-25 RX ADMIN — PANTOPRAZOLE SODIUM SCH MG: 40 TABLET, DELAYED RELEASE ORAL at 06:57

## 2022-08-25 RX ADMIN — GABAPENTIN SCH MG: 100 CAPSULE ORAL at 21:33

## 2022-08-25 RX ADMIN — HEPARIN SODIUM SCH UNIT: 5000 INJECTION INTRAVENOUS; SUBCUTANEOUS at 06:56

## 2022-08-25 RX ADMIN — CEFEPIME HYDROCHLORIDE SCH MLS/HR: 2 INJECTION, POWDER, FOR SOLUTION INTRAVENOUS at 06:58

## 2022-08-25 RX ADMIN — SODIUM CHLORIDE SCH MLS/HR: 900 INJECTION, SOLUTION INTRAVENOUS at 21:32

## 2022-08-25 RX ADMIN — CEFEPIME HYDROCHLORIDE SCH MLS/HR: 2 INJECTION, POWDER, FOR SOLUTION INTRAVENOUS at 23:33

## 2022-08-25 RX ADMIN — HEPARIN SODIUM SCH UNIT: 5000 INJECTION INTRAVENOUS; SUBCUTANEOUS at 16:35

## 2022-08-25 RX ADMIN — GABAPENTIN SCH MG: 100 CAPSULE ORAL at 06:57

## 2022-08-25 RX ADMIN — GABAPENTIN SCH MG: 100 CAPSULE ORAL at 16:36

## 2022-08-25 RX ADMIN — HEPARIN SODIUM SCH UNIT: 5000 INJECTION INTRAVENOUS; SUBCUTANEOUS at 00:52

## 2022-08-25 RX ADMIN — CEFEPIME HYDROCHLORIDE SCH MLS/HR: 2 INJECTION, POWDER, FOR SOLUTION INTRAVENOUS at 16:36

## 2022-08-25 RX ADMIN — THERA TABS SCH EACH: TAB at 06:57

## 2022-08-25 RX ADMIN — SODIUM CHLORIDE SCH MLS/HR: 900 INJECTION, SOLUTION INTRAVENOUS at 12:10

## 2022-08-25 RX ADMIN — FAMOTIDINE SCH MG: 20 TABLET, FILM COATED ORAL at 06:57

## 2022-08-25 RX ADMIN — HEPARIN SODIUM SCH: 5000 INJECTION INTRAVENOUS; SUBCUTANEOUS at 23:32

## 2022-08-25 NOTE — P.PN
Subjective


Progress Note Date: 08/24/22


Principal diagnosis: 





R leg wound and cellulitis


Patient is a 71-year-old  male presenting to the hospital with multiple

complaints including wound to the right lower extremity and concerning for 

secondary cellulitis.


On today's evaluation that is 8/24/2022, the patient remains to be febrile, 

patient slightly more awake and alert today, denies any chest pain or shortness 

of breath occasional cough still combining of some pain to the right lower 

extremity but no worsening





Objective





- Vital Signs


Vital signs: 


                                   Vital Signs











Temp  97.9 F   08/24/22 08:00


 


Pulse  71   08/24/22 08:00


 


Resp  18   08/24/22 08:00


 


BP  151/80   08/24/22 08:00


 


Pulse Ox  97   08/24/22 08:00


 


FiO2      








                                 Intake & Output











 08/23/22 08/24/22 08/24/22





 18:59 06:59 18:59


 


Intake Total 300 300 


 


Output Total 720  


 


Balance -420 300 


 


Weight 95.254 kg  


 


Intake:   


 


  Intake, IV Titration 300  





  Amount   


 


    Cefepime 2 gm In Sodium 200  





    Chloride 0.9% 100 ml @ 25   





    mls/hr IVPB Q8HR [facility] Rx#   





    :[id number]   


 


    Thiamine 100 mg In Sodium 100  





    Chloride 0.9% 50 ml @   





    100 mls/hr IVPB Q12HR [facility]   





    Rx#:[id number]   


 


  Oral  300 


 


Output:   


 


  Urine 720  


 


Other:   


 


  Voiding Method Urinal Urinal 


 


  # Voids  4 


 


  # Bowel Movements  1 














- Exam


GENERAL DESCRIPTION: Elderly male lying in bed, no distress. No tachypnea or 

accessory muscle of respiration use.


 LUNGS: Unlabored breathing. Clear to auscultation anteriorly. No wheeze or 

crackle.


HEART: S1, S2, regular rate and rhythm. No loud murmur


ABDOMEN: Soft, no tenderness , guarding or rigidity, no organomegaly


EXTREMITIES right lower extremity wound is currently dressed no drainage on the 

dressing











- Labs


CBC & Chem 7: 


                                 08/24/22 07:11





                                 08/24/22 07:11


Labs: 


                  Abnormal Lab Results - Last 24 Hours (Table)











  08/24/22 Range/Units





  07:11 


 


BUN/Creatinine Ratio  26.78 H  (12.00-20.00)  Ratio


 


Calcium  8.5 L  (8.7-10.3)  mg/dL


 


Total Protein  5.9 L  (6.2-8.2)  g/dL


 


Albumin  3.5 L  (3.8-4.9)  g/dL


 


Albumin/Globulin Ratio  1.46 L  (1.60-3.17)  g/dL








                      Microbiology - Last 24 Hours (Table)











 08/19/22 12:20 Gram Stain - Final





 Foot - Right Wound Culture - Final





    Acinetobacter lwoffi





    Morganella morganii





    Acinetobacter som/haemol





    Staphylococcus aureus














Assessment and Plan


(1) Cellulitis


Current Visit: No   Status: Acute   Code(s): L03.90 - CELLULITIS, UNSPECIFIED   

SNOMED Code(s): 736191033


   


Plan: 


1patient with right lower extremity wound and secondary cellulitis local wound 

cultures are currently growing multiple pathogens including Acinetobacter 

Morganella which is sensitive to cefepime which will be continued on inpatient 

with the plan to finish therapy with oral antibiotics in the form of Cipro and 

Keflex


2-local wound care to continue with the Medihoney followed by moist dressing 

change daily








Time with Patient: Less than 30

## 2022-08-25 NOTE — P.DS
Providers


Date of admission: 


08/18/22 02:49





Expected date of discharge: 08/25/22


Attending physician: 


Rajinder Foreman





Consults: 





                                        





08/18/22 19:43


Consult Physician Routine 


   Consulting Provider: Haley Baez


   Consult Reason/Comments: Lower extremity cellulitis


   Do you want consulting provider notified?: Yes











Primary care physician: 


Stated None





Hospital Course: 











Diagnosis on discharge:











Alcohol intoxication





Early alcohol withdrawal will





Right lower extremity cellulitis with open ulcers





Mental status changes with delirium





Underlying history of hypertension





Underlying history of paroxysmal atrial fibrillation not on anticoagulation due 

to multiple falls





Underlying history of depression with anxiety





Underlying history of excessive alcohol use patient was admitted recently with 

alcohol intoxication




















Hospital course:











Roshan Mir, he is a 71-year-old male who presented to [facility] emergency room with a chief complaint of alcohol intoxication and 

confusion, his living condition were not sanitary, he had a large ulcer on the 

medial aspect of the right ankle.


He was evaluated in the emergency room vital examination on presentation 

revealed a temperature of 98 pulse 84 respiration 20 blood pressure 154/90 and 

pulse ox 96% on room air


Laboratory data reveals a white blood count of 7.5 hemoglobin 16.6 platelet 

count 84 sodium 133 potassium 3.9 chloride 95 CO2 21 BUN 9 creatinine 0.88 

troponin level was 0.0-3


Testing in the emergency room revealed computed tomography scan of the head and 

neck done in the emergency room revealed cerebral atrophy and chronic small 

vessel ischemia no acute intracranial abnormality no change compared to old 

exam, chest x-ray done in the emergency room revealed chronic elevation of the 

right diaphragmatic no change compared to old exam no acute lung disease, EKG 

done in the emergency room revealed supraventricular rhythm with possible 

lateral ischemia


Patient was admitted to medical floor for further evaluation and treatment


On review of systems patient is complaining of right foot and ankle pain 

otherwise he denies any complaints there is no fever or chills no headache or 

dizziness no chest pain no shortness of breath no cough no nausea or vomiting no

abdominal pain no diarrhea and no urinary symptoms.





On 08/19/2022 patient was seen and examined on the medical floor he is alert and

responsive in no apparent distress he is complaining of generalized joint pain 

mostly in bilateral knees and his hands joints, otherwise he denies any comp

laints there is no fever or chills no headache or dizziness no chest pain no 

shortness of breath no cough no nausea or vomiting no abdominal pain no diarrhea

and no urinary symptoms.  At this point will check uric acid level will give 1 

dose of IV Toradol and continue to monitor closely.  Continue with IV cefazolin 

for lower extremity cellulitis awaiting input from infectious disease








On 08/20/2022 patient was seen and examined on the medical floor he is alert and

responsive in no apparent distress he is complaining of lower extremity pain, 

otherwise he denies any complaints there is no fever or chills no headache or 

dizziness no chest pain no shortness of breath no cough no nausea or vomiting no

abdominal pain no diarrhea and no urinary symptoms. Patient see n by Dr Baez, 

wound culture taken.  Continue with IV cefazolin for lower extremity cellulitis 

awaiting input from infectious disease.








On 08/21/2022 patient was seen and examined on the medical floor he is alert and

responsive in no apparent distress he was having some episodes of agitation last

night he is still receiving IV Ativan, patient is still receiving IV antibiotic 

for lower extremity cellulitis with ulcer otherwise patient denies any 

complaints there is no fever or chills no headache or dizziness no chest pain no

shortness of breath no cough no nausea or vomiting no abdominal pain no diarrhea

and no urinary symptoms





On 08/22/2022 patient was seen and examined on the medical floor he is alert and

oriented 3 in no apparent distress there is no fever or chills no headache or 

dizziness no chest pain no shortness of breath no cough no nausea or vomiting no

abdominal pain no diarrhea and no urinary symptoms.  He is still complaining of 

bilateral lower extremity pain and difficulty standing and walking, physical 

therapy were consult patient may need to go to rehab after the acute admission








On 08/23/2022 patient was seen and examined on the medical floor he is alert and

oriented 3 in no apparent distress, he is still complaining of lower extremity 

pain and difficulty walking otherwise he denies any complaints there is no fever

or chills no headache or dizziness no chest pain no shortness of breath no cough

no nausea or vomiting no abdominal pain no diarrhea and no urinary symptoms.  

physical therapy were consult patient may need to go to rehab after the acute 

admission





On 08/24/2022 patient was seen and examined on the medical floor he is alert and

oriented 3 in no distress he is complaining of ankle pain and difficulty 

standing and walking otherwise he denies any complaints there is no fever or 

chills no headache or dizziness no chest pain no shortness of breath no cough no

nausea or vomiting no abdominal pain no diarrhea no blood in the stools no 

burning with urination no frequency or urgency no hematuria he is followed by 

physical therapy he would need to be transferred to a rehab unit when stable





Patient Condition at Discharge: Stable





Plan - Discharge Summary


Discharge Rx Participant: No


New Discharge Prescriptions: 


New


   Cephalexin [Keflex] 500 mg PO Q8HR 10 Days #30 cap


   Multivitamins, Thera [Multivitamin (formulary)] 1 each PO DAILY  tab


   Pantoprazole [Protonix] 40 mg PO AC-BRKFST  tab


   LORazepam [Ativan] 0.5 mg PO QID PRN  tab


     PRN Reason: Anxiety


   Ciprofloxacin HCl [Cipro] 250 mg PO Q12HR 10 Days #20 tab





Continue


   carvediloL [Coreg*] 12.5 mg PO BID-W/MEALS #60 tab


   amLODIPine [Norvasc] 10 mg PO DAILY #30 tab


   Acetaminophen Tab [Tylenol] 650 mg PO Q6HR PRN  tab


     PRN Reason: Fever And/ Or Pain


   Famotidine [Pepcid] 20 mg PO DAILY #30 tab


   Thiamine [Vitamin B-1] 100 mg PO BID-W/MEALS #60 tab


   Gabapentin [Neurontin] 100 mg PO TID  cap


   allopurinoL [Zyloprim] 300 mg PO DAILY  tab


Discharge Medication List





Acetaminophen Tab [Tylenol] 650 mg PO Q6HR PRN  tab 07/19/22 [Rx]


Famotidine [Pepcid] 20 mg PO DAILY #30 tab 07/19/22 [Rx]


Thiamine [Vitamin B-1] 100 mg PO BID-W/MEALS #60 tab 07/19/22 [Rx]


amLODIPine [Norvasc] 10 mg PO DAILY #30 tab 07/19/22 [Rx]


carvediloL [Coreg*] 12.5 mg PO BID-W/MEALS #60 tab 07/19/22 [Rx]


Gabapentin [Neurontin] 100 mg PO TID  cap 07/26/22 [Rx]


allopurinoL [Zyloprim] 300 mg PO DAILY  tab 07/26/22 [Rx]


Cephalexin [Keflex] 500 mg PO Q8HR 10 Days #30 cap 08/25/22 [Rx]


Ciprofloxacin HCl [Cipro] 250 mg PO Q12HR 10 Days #20 tab 08/25/22 [Rx]


LORazepam [Ativan] 0.5 mg PO QID PRN  tab 08/25/22 [Rx]


Multivitamins, Thera [Multivitamin (formulary)] 1 each PO DAILY  tab 08/25/22 

[Rx]


Pantoprazole [Protonix] 40 mg PO AC-BRKFST  tab 08/25/22 [Rx]








Follow up Appointment(s)/Referral(s): 


[facility], [NON-STAFF] - 1-2 Days


Rajinder Foreman MD [STAFF PHYSICIAN] - 1-2 Days

## 2022-08-26 RX ADMIN — CEFEPIME HYDROCHLORIDE SCH MLS/HR: 2 INJECTION, POWDER, FOR SOLUTION INTRAVENOUS at 15:17

## 2022-08-26 RX ADMIN — PANTOPRAZOLE SODIUM SCH MG: 40 TABLET, DELAYED RELEASE ORAL at 09:20

## 2022-08-26 RX ADMIN — CEFEPIME HYDROCHLORIDE SCH MLS/HR: 2 INJECTION, POWDER, FOR SOLUTION INTRAVENOUS at 10:44

## 2022-08-26 RX ADMIN — GABAPENTIN SCH MG: 100 CAPSULE ORAL at 23:41

## 2022-08-26 RX ADMIN — GABAPENTIN SCH MG: 100 CAPSULE ORAL at 09:20

## 2022-08-26 RX ADMIN — SODIUM CHLORIDE SCH MLS/HR: 900 INJECTION, SOLUTION INTRAVENOUS at 20:27

## 2022-08-26 RX ADMIN — HEPARIN SODIUM SCH UNIT: 5000 INJECTION INTRAVENOUS; SUBCUTANEOUS at 09:20

## 2022-08-26 RX ADMIN — HEPARIN SODIUM SCH UNIT: 5000 INJECTION INTRAVENOUS; SUBCUTANEOUS at 15:18

## 2022-08-26 RX ADMIN — GABAPENTIN SCH MG: 100 CAPSULE ORAL at 15:18

## 2022-08-26 RX ADMIN — CEFEPIME HYDROCHLORIDE SCH MLS/HR: 2 INJECTION, POWDER, FOR SOLUTION INTRAVENOUS at 23:42

## 2022-08-26 RX ADMIN — SODIUM CHLORIDE SCH MLS/HR: 900 INJECTION, SOLUTION INTRAVENOUS at 09:48

## 2022-08-26 RX ADMIN — HEPARIN SODIUM SCH UNIT: 5000 INJECTION INTRAVENOUS; SUBCUTANEOUS at 23:41

## 2022-08-26 RX ADMIN — FAMOTIDINE SCH MG: 20 TABLET, FILM COATED ORAL at 09:20

## 2022-08-26 RX ADMIN — THERA TABS SCH EACH: TAB at 09:20

## 2022-08-26 RX ADMIN — POTASSIUM CHLORIDE SCH: 20 TABLET, EXTENDED RELEASE ORAL at 07:09

## 2022-08-26 NOTE — P.PN
Subjective


Progress Note Date: 08/25/22


Principal diagnosis: 





R leg wound and cellulitis


Patient is a 71-year-old  male presenting to the hospital with multiple

complaints including wound to the right lower extremity and concerning for 

secondary cellulitis.


On today's evaluation that is 8/25/2022, the patient continues to be febrile, 

the patient is breathing comfortably on room air, denies any chest pain or s

hortness of breath occasional cough still combining of some pain to the right 

lower extremity but no worsening





Objective





- Vital Signs


Vital signs: 


                                   Vital Signs











Temp  97.7 F   08/25/22 06:55


 


Pulse  71   08/25/22 06:55


 


Resp  17   08/25/22 06:55


 


BP  146/77   08/25/22 06:55


 


Pulse Ox  97   08/25/22 06:55


 


FiO2      








                                 Intake & Output











 08/24/22 08/25/22 08/25/22





 18:59 06:59 18:59


 


Intake Total 1080  


 


Output Total 850 50 


 


Balance 230 -50 


 


Intake:   


 


  Oral 1080  


 


Output:   


 


  Urine 850 50 


 


Other:   


 


  Voiding Method  Urinal Urinal


 


  # Bowel Movements 1  














- Exam


GENERAL DESCRIPTION: Elderly male lying in bed, no distress. No tachypnea or 

accessory muscle of respiration use.


 LUNGS: Unlabored breathing. Clear to auscultation anteriorly. No wheeze or 

crackle.


HEART: S1, S2, regular rate and rhythm. No loud murmur


ABDOMEN: Soft, no tenderness , guarding or rigidity, no organomegaly


EXTREMITIES right lower extremity wound is currently dressed no drainage on the 

dressing











- Labs


CBC & Chem 7: 


                                 08/24/22 07:11





                                 08/24/22 07:11


Labs: 


                  Abnormal Lab Results - Last 24 Hours (Table)











  08/24/22 Range/Units





  07:11 


 


Immature Gran #  0.05 H  (0.00-0.04)  X 10*3/uL








                      Microbiology - Last 24 Hours (Table)











 08/19/22 12:20 Gram Stain - Final





 Foot - Right Wound Culture - Final





    Acinetobacter lwoffi





    Morganella morganii





    Acinetobacter som/haemol





    Staphylococcus aureus














Assessment and Plan


(1) Cellulitis


Current Visit: No   Status: Acute   Code(s): L03.90 - CELLULITIS, UNSPECIFIED   

SNOMED Code(s): 573886596


   


Plan: 


1patient with right lower extremity wound and secondary cellulitis local wound 

cultures are currently growing multiple pathogens including Acinetobacter 

Morganella which is sensitive to cefepime , patient symptomatic and clinical 

improvement and will continue with cefepime while  inpatient with the plan to 

finish therapy with oral antibiotics in the form of Cipro and Keflex


2-local wound care to continue with the Medihoney followed by moist dressing 

change daily








Time with Patient: Less than 30

## 2022-08-26 NOTE — P.PN
Subjective


Progress Note Date: 08/26/22


Principal diagnosis: 





R leg wound and cellulitis


Patient is a 71-year-old  male presenting to the hospital with multiple

complaints including wound to the right lower extremity and concerning for 

secondary cellulitis.


On today's evaluation that is 8/26/2022, the patient denies any fever or any 

chills, the patient is breathing comfortably on room air, the patient denies any

chest pain or shortness of breath occasional cough, the patient pain to the 

right lower extremity has decreased intensity there is no drainage





Objective





- Vital Signs


Vital signs: 


                                   Vital Signs











Temp  97.6 F   08/26/22 09:21


 


Pulse  66   08/26/22 09:21


 


Resp  17   08/26/22 09:21


 


BP  159/77   08/26/22 09:21


 


Pulse Ox  98   08/26/22 09:21


 


FiO2      








                                 Intake & Output











 08/25/22 08/26/22 08/26/22





 18:59 06:59 18:59


 


Intake Total 480 800 


 


Output Total  600 


 


Balance 480 200 


 


Intake:   


 


  Intake, IV Titration  200 





  Amount   


 


    Cefepime 2 gm In Sodium  100 





    Chloride 0.9% 100 ml @ 25   





    mls/hr IVPB [facility]   





    :[id number]   


 


    Thiamine 100 mg In Sodium  100 





    Chloride 0.9% 50 ml @   





    100 mls/hr [facility]   





    Rx#:[id number]   


 


  Oral 480 600 


 


Output:   


 


  Urine  600 


 


Other:   


 


  Voiding Method Urinal  


 


  # Voids 5  1


 


  # Bowel Movements   1














- Exam


GENERAL DESCRIPTION: Elderly male lying in bed, no distress. No tachypnea or 

accessory muscle of respiration use.


 LUNGS: Unlabored breathing. Clear to auscultation anteriorly. No wheeze or 

crackle.


HEART: S1, S2, regular rate and rhythm. No loud murmur


ABDOMEN: Soft, no tenderness , guarding or rigidity, no organomegaly


EXTREMITIES right lower extremity swelling and redness has improved wound has 

decreased in size minimal blood stained drainage





- Labs


CBC & Chem 7: 


                                 08/24/22 07:11





                                 08/24/22 07:11





Assessment and Plan


(1) Cellulitis


Current Visit: No   Status: Acute   Code(s): L03.90 - CELLULITIS, UNSPECIFIED   

SNOMED Code(s): 507546203


   


Plan: 


1patient with right lower extremity wound and secondary cellulitis local wound 

cultures are currently growing multiple pathogens including Acinetobacter 

Morganella which is sensitive to cefepime , patient symptomatic and clinical 

improvement and will continue with cefepime while  inpatient with the plan to 

finish therapy with oral antibiotics in the form of Cipro and Keflex


2-local wound care will be switched over to Aquacel silver dressing to be 

changed every 48 hour


Time with Patient: Less than 30

## 2022-08-27 RX ADMIN — PANTOPRAZOLE SODIUM SCH MG: 40 TABLET, DELAYED RELEASE ORAL at 08:47

## 2022-08-27 RX ADMIN — CEFEPIME HYDROCHLORIDE SCH MLS/HR: 2 INJECTION, POWDER, FOR SOLUTION INTRAVENOUS at 12:20

## 2022-08-27 RX ADMIN — FAMOTIDINE SCH MG: 20 TABLET, FILM COATED ORAL at 08:48

## 2022-08-27 RX ADMIN — GABAPENTIN SCH MG: 100 CAPSULE ORAL at 17:00

## 2022-08-27 RX ADMIN — HEPARIN SODIUM SCH: 5000 INJECTION INTRAVENOUS; SUBCUTANEOUS at 23:51

## 2022-08-27 RX ADMIN — SODIUM CHLORIDE SCH MLS/HR: 900 INJECTION, SOLUTION INTRAVENOUS at 08:47

## 2022-08-27 RX ADMIN — GABAPENTIN SCH MG: 100 CAPSULE ORAL at 21:01

## 2022-08-27 RX ADMIN — THERA TABS SCH EACH: TAB at 08:48

## 2022-08-27 RX ADMIN — CEFEPIME HYDROCHLORIDE SCH MLS/HR: 2 INJECTION, POWDER, FOR SOLUTION INTRAVENOUS at 21:01

## 2022-08-27 RX ADMIN — HEPARIN SODIUM SCH: 5000 INJECTION INTRAVENOUS; SUBCUTANEOUS at 16:06

## 2022-08-27 RX ADMIN — SODIUM CHLORIDE SCH MLS/HR: 900 INJECTION, SOLUTION INTRAVENOUS at 20:11

## 2022-08-27 RX ADMIN — HEPARIN SODIUM SCH: 5000 INJECTION INTRAVENOUS; SUBCUTANEOUS at 08:47

## 2022-08-27 RX ADMIN — GABAPENTIN SCH MG: 100 CAPSULE ORAL at 08:48

## 2022-08-27 NOTE — P.PN
Subjective


Progress Note Date: 08/26/22








Roshan Mir, he is a 71-year-old male who presented to [facility] emergency room with a chief complaint of alcohol intoxication and 

confusion, his living condition were not sanitary, he had a large ulcer on the 

medial aspect of the right ankle.


He was evaluated in the emergency room vital examination on presentation 

revealed a temperature of 98 pulse 84 respiration 20 blood pressure 154/90 and 

pulse ox 96% on room air


Laboratory data reveals a white blood count of 7.5 hemoglobin 16.6 platelet 

count 84 sodium 133 potassium 3.9 chloride 95 CO2 21 BUN 9 creatinine 0.88 

troponin level was 0.0-3


Testing in the emergency room revealed computed tomography scan of the head and 

neck done in the emergency room revealed cerebral atrophy and chronic small 

vessel ischemia no acute intracranial abnormality no change compared to old 

exam, chest x-ray done in the emergency room revealed chronic elevation of the 

right diaphragmatic no change compared to old exam no acute lung disease, EKG 

done in the emergency room revealed supraventricular rhythm with possible 

lateral ischemia


Patient was admitted to medical floor for further evaluation and treatment


On review of systems patient is complaining of right foot and ankle pain 

otherwise he denies any complaints there is no fever or chills no headache or 

dizziness no chest pain no shortness of breath no cough no nausea or vomiting no

abdominal pain no diarrhea and no urinary symptoms.





On 08/19/2022 patient was seen and examined on the medical floor he is alert and

responsive in no apparent distress he is complaining of generalized joint pain 

mostly in bilateral knees and his hands joints, otherwise he denies any 

complaints there is no fever or chills no headache or dizziness no chest pain no

shortness of breath no cough no nausea or vomiting no abdominal pain no diarrhea

and no urinary symptoms.  At this point will check uric acid level will give 1 

dose of IV Toradol and continue to monitor closely.  Continue with IV cefazolin 

for lower extremity cellulitis awaiting input from infectious disease








On 08/20/2022 patient was seen and examined on the medical floor he is alert and

responsive in no apparent distress he is complaining of lower extremity pain, 

otherwise he denies any complaints there is no fever or chills no headache or 

dizziness no chest pain no shortness of breath no cough no nausea or vomiting no

abdominal pain no diarrhea and no urinary symptoms. Patient see n by Dr Baez, 

wound culture taken.  Continue with IV cefazolin for lower extremity cellulitis 

awaiting input from infectious disease.








On 08/21/2022 patient was seen and examined on the medical floor he is alert and

responsive in no apparent distress he was having some episodes of agitation last

night he is still receiving IV Ativan, patient is still receiving IV antibiotic 

for lower extremity cellulitis with ulcer otherwise patient denies any 

complaints there is no fever or chills no headache or dizziness no chest pain no

shortness of breath no cough no nausea or vomiting no abdominal pain no diarrhea

and no urinary symptoms





On 08/22/2022 patient was seen and examined on the medical floor, he is alert 

and oriented 3 in no apparent distress, there is no fever or chills no headache

or dizziness no chest pain no shortness of breath no cough no nausea or vomiting

no abdominal pain no diarrhea and no urinary symptoms, he has good urine output,

nephrology are following and no hemodialysis is recommended at this time, physic

al therapy and occupational therapy are following.





On 08/23/2022 patient was seen and examined on the medical floor he is alert and

oriented 3 in no apparent distress, he is still complaining of lower extremity 

pain and difficulty walking otherwise he denies any complaints there is no fever

or chills no headache or dizziness no chest pain no shortness of breath no cough

no nausea or vomiting no abdominal pain no diarrhea and no urinary symptoms.  

physical therapy were consult patient may need to go to rehab after the acute 

admission








On 08/24/2022 patient was seen and examined on the medical floor he is alert and

oriented 3 in no distress he is complaining of ankle pain and difficulty st

anding and walking otherwise he denies any complaints there is no fever or 

chills no headache or dizziness no chest pain no shortness of breath no cough no

nausea or vomiting no abdominal pain no diarrhea no blood in the stools no 

burning with urination no frequency or urgency no hematuria he is followed by 

physical therapy he would need to be transferred to a rehab unit when stable.





On 08/25/2022 patient was seen and examined on the medical floor, he is alert 

and oriented 3 in no apparent distress, there is no fever or chills no headache

or dizziness no chest pain no shortness of breath no cough no nausea or vomiting

no abdominal pain no diarrhea and no urinary symptoms. physical therapy and 

occupational therapy are following.








On 08/26/2022 patient was seen and examined on the medical floor, he is alert 

and oriented, there is no fever or chills no headache or dizziness no chest pain

no shortness of breath no cough no nausea or vomiting no abdominal pain no 

diarrhea and no urinary symptoms, he has good urine output, , physical therapy 

and occupational therapy are following.  We are awaiting nursing home placement








Objective





- Vital Signs


Vital signs: 


                                   Vital Signs











Temp  97.6 F   08/26/22 09:21


 


Pulse  66   08/26/22 09:21


 


Resp  17   08/26/22 09:21


 


BP  159/77   08/26/22 09:21


 


Pulse Ox  98   08/26/22 09:21


 


FiO2      








                                 Intake & Output











 08/25/22 08/26/22 08/26/22





 18:59 06:59 18:59


 


Intake Total 480 800 


 


Output Total  600 


 


Balance 480 200 


 


Intake:   


 


  Intake, IV Titration  200 





  Amount   


 


    Cefepime 2 gm In Sodium  100 





    Chloride 0.9% 100 ml @ 25   





    mls/hr IVPB Q8HR [facility] Rx#   





    :[id number]   


 


    Thiamine 100 mg In Sodium  100 





    Chloride 0.9% 50 ml @   





    100 mls/hr IVPB Q12HR [facility]   





    Rx#:[id number]   


 


  Oral 480 600 


 


Output:   


 


  Urine  600 


 


Other:   


 


  Voiding Method Urinal  


 


  # Voids 5  1


 


  # Bowel Movements   1














- Exam








In general patient is alert and oriented x 3 in no distress


HEENT head normocephalic and atraumatic


Neck is supple no JVD no goiter no lymphadenopathy no carotid bruit


Chest examination is clear to auscultation no crackles no wheezing


Cardiac exam reveals regular heart sounds S1 and S2 no gallops no murmurs


Abdomen is soft nontender no organomegaly with normal bowel sounds


Extremity exam reveals no edema no cyanosis or clubbing right foot erythema and 

medial right ankle scabbed ulcer


Neurological examination reveals no gross focal deficits





- Labs


CBC & Chem 7: 


                                 08/24/22 07:11





                                 08/24/22 07:11





Assessment and Plan


Plan: 








Alcohol intoxication





Early alcohol withdrawal will





Right lower extremity cellulitis with 





Mental status changes with delirium





Underlying history of hypertension





Underlying history of paroxysmal atrial fibrillation not on anticoagulation due 

to multiple falls





Underlying history of depression with anxiety





Underlying history of excessive alcohol use patient was admitted recently with 

alcohol intoxication








At this time patient is admitted to medical floor he was started on CIWA 

protocol with IV Ativan


Will start IV cefazolin for right lower extremity cellulitis


Will check right lower extremity venous Doppler


Recheck labs in a.m.


Prognosis is guarded patient is not receptive to any counseling at this time 

regarding alcohol will try in the next few days

## 2022-08-27 NOTE — P.PN
Subjective


Progress Note Date: 08/27/22


Principal diagnosis: 





R leg wound and cellulitis


Patient is a 71-year-old  male presenting to the hospital with multiple

complaints including wound to the right lower extremity and concerning for 

secondary cellulitis.


On today's evaluation that is 8/27/2022, the patient remains to be afebrile, the

patient is breathing comfortably on room air, the patient denies chest pain or 

shortness of breath , the patient did have occasional cough, the patient denies 

any worsening pain to the right lower extremity 





Objective





- Vital Signs


Vital signs: 


                                   Vital Signs











Temp  98.3 F   08/27/22 14:00


 


Pulse  65   08/27/22 14:00


 


Resp  18   08/27/22 14:00


 


BP  109/69   08/27/22 14:00


 


Pulse Ox  96   08/27/22 14:00


 


FiO2      








                                 Intake & Output











 08/26/22 08/27/22 08/27/22





 18:59 06:59 18:59


 


Output Total  410 


 


Balance  -410 


 


Output:   


 


  Urine  410 


 


Other:   


 


  Voiding Method  Urinal Urinal


 


  # Voids 1 2 


 


  # Bowel Movements 1  














- Exam


GENERAL DESCRIPTION: Elderly male lying in bed, no distress. No tachypnea or 

accessory muscle of respiration use.


 LUNGS: Unlabored breathing. Clear to auscultation anteriorly. No wheeze or 

crackle.


HEART: S1, S2, regular rate and rhythm. No loud murmur


ABDOMEN: Soft, no tenderness , guarding or rigidity, no organomegaly


EXTREMITIES right lower extremity swelling and redness has improved wound has 

decreased in size minimal blood stained drainage





- Labs


CBC & Chem 7: 


                                 08/24/22 07:11





                                 08/24/22 07:11





Assessment and Plan


(1) Cellulitis


Current Visit: No   Status: Acute   Code(s): L03.90 - CELLULITIS, UNSPECIFIED   

SNOMED Code(s): 188025168


   


Plan: 


1patient with right lower extremity wound and secondary cellulitis local wound 

cultures are currently growing multiple pathogens including Acinetobacter 

Morganella which is sensitive to cefepime , patient symptomatic and clinical 

improvement and will continue with cefepime while  inpatient with the plan to 

finish therapy with oral antibiotics in the form of Cipro and Keflex, currently 

waiting for placement


2-local wound care will be continued with Aquacel silver dressing to be changed 

every 48 hour


Time with Patient: Less than 30

## 2022-08-27 NOTE — P.PN
Subjective


Progress Note Date: 08/27/22








Roshan Mir, he is a 71-year-old male who presented to [facility] emergency room with a chief complaint of alcohol intoxication and 

confusion, his living condition were not sanitary, he had a large ulcer on the 

medial aspect of the right ankle.


He was evaluated in the emergency room vital examination on presentation 

revealed a temperature of 98 pulse 84 respiration 20 blood pressure 154/90 and 

pulse ox 96% on room air


Laboratory data reveals a white blood count of 7.5 hemoglobin 16.6 platelet 

count 84 sodium 133 potassium 3.9 chloride 95 CO2 21 BUN 9 creatinine 0.88 

troponin level was 0.0-3


Testing in the emergency room revealed computed tomography scan of the head and 

neck done in the emergency room revealed cerebral atrophy and chronic small 

vessel ischemia no acute intracranial abnormality no change compared to old 

exam, chest x-ray done in the emergency room revealed chronic elevation of the 

right diaphragmatic no change compared to old exam no acute lung disease, EKG 

done in the emergency room revealed supraventricular rhythm with possible 

lateral ischemia


Patient was admitted to medical floor for further evaluation and treatment


On review of systems patient is complaining of right foot and ankle pain 

otherwise he denies any complaints there is no fever or chills no headache or 

dizziness no chest pain no shortness of breath no cough no nausea or vomiting no

abdominal pain no diarrhea and no urinary symptoms.





On 08/19/2022 patient was seen and examined on the medical floor he is alert and

responsive in no apparent distress he is complaining of generalized joint pain 

mostly in bilateral knees and his hands joints, otherwise he denies any 

complaints there is no fever or chills no headache or dizziness no chest pain no

shortness of breath no cough no nausea or vomiting no abdominal pain no diarrhea

and no urinary symptoms.  At this point will check uric acid level will give 1 

dose of IV Toradol and continue to monitor closely.  Continue with IV cefazolin 

for lower extremity cellulitis awaiting input from infectious disease








On 08/20/2022 patient was seen and examined on the medical floor he is alert and

responsive in no apparent distress he is complaining of lower extremity pain, 

otherwise he denies any complaints there is no fever or chills no headache or 

dizziness no chest pain no shortness of breath no cough no nausea or vomiting no

abdominal pain no diarrhea and no urinary symptoms. Patient see n by Dr Baez, 

wound culture taken.  Continue with IV cefazolin for lower extremity cellulitis 

awaiting input from infectious disease.








On 08/21/2022 patient was seen and examined on the medical floor he is alert and

responsive in no apparent distress he was having some episodes of agitation last

night he is still receiving IV Ativan, patient is still receiving IV antibiotic 

for lower extremity cellulitis with ulcer otherwise patient denies any 

complaints there is no fever or chills no headache or dizziness no chest pain no

shortness of breath no cough no nausea or vomiting no abdominal pain no diarrhea

and no urinary symptoms





On 08/22/2022 patient was seen and examined on the medical floor, he is alert 

and oriented 3 in no apparent distress, there is no fever or chills no headache

or dizziness no chest pain no shortness of breath no cough no nausea or vomiting

no abdominal pain no diarrhea and no urinary symptoms, he has good urine output,

nephrology are following and no hemodialysis is recommended at this time, physic

al therapy and occupational therapy are following.





On 08/23/2022 patient was seen and examined on the medical floor he is alert and

oriented 3 in no apparent distress, he is still complaining of lower extremity 

pain and difficulty walking otherwise he denies any complaints there is no fever

or chills no headache or dizziness no chest pain no shortness of breath no cough

no nausea or vomiting no abdominal pain no diarrhea and no urinary symptoms.  

physical therapy were consult patient may need to go to rehab after the acute 

admission








On 08/24/2022 patient was seen and examined on the medical floor he is alert and

oriented 3 in no distress he is complaining of ankle pain and difficulty st

anding and walking otherwise he denies any complaints there is no fever or 

chills no headache or dizziness no chest pain no shortness of breath no cough no

nausea or vomiting no abdominal pain no diarrhea no blood in the stools no 

burning with urination no frequency or urgency no hematuria he is followed by 

physical therapy he would need to be transferred to a rehab unit when stable.





On 08/25/2022 patient was seen and examined on the medical floor, he is alert 

and oriented 3 in no apparent distress, there is no fever or chills no headache

or dizziness no chest pain no shortness of breath no cough no nausea or vomiting

no abdominal pain no diarrhea and no urinary symptoms. physical therapy and 

occupational therapy are following.








On 08/26/2022 patient was seen and examined on the medical floor, he is alert 

and oriented, there is no fever or chills no headache or dizziness no chest pain

no shortness of breath no cough no nausea or vomiting no abdominal pain no 

diarrhea and no urinary symptoms, he has good urine output, , physical therapy 

and occupational therapy are following.  We are awaiting nursing home placement








On 08/27/2022 patient was seen and examined on the medical floor he is alert and

oriented in no distress he is still complaining of generalized weakness and gait

disturbance is also complaining of severe pain at the site of his foot ulcer 

otherwise he denies any complaints there is no fever or chills no headache or 

dizziness no chest pain no shortness of breath no cough no nausea or vomiting no

abdominal pain no diarrhea and no urinary symptoms





Objective





- Vital Signs


Vital signs: 


                                   Vital Signs











Temp  97.6 F   08/27/22 07:54


 


Pulse  67   08/27/22 07:54


 


Resp  16   08/27/22 07:54


 


BP  157/91   08/27/22 07:54


 


Pulse Ox  99   08/27/22 07:54


 


FiO2      








                                 Intake & Output











 08/26/22 08/27/22 08/27/22





 18:59 06:59 18:59


 


Output Total  410 


 


Balance  -410 


 


Output:   


 


  Urine  410 


 


Other:   


 


  Voiding Method  Urinal Urinal


 


  # Voids 1 2 


 


  # Bowel Movements 1  














- Exam








In general patient is alert and oriented x 3 in no distress


HEENT head normocephalic and atraumatic


Neck is supple no JVD no goiter no lymphadenopathy no carotid bruit


Chest examination is clear to auscultation no crackles no wheezing


Cardiac exam reveals regular heart sounds S1 and S2 no gallops no murmurs


Abdomen is soft nontender no organomegaly with normal bowel sounds


Extremity exam reveals no edema no cyanosis or clubbing right foot erythema and 

medial right ankle scabbed ulcer


Neurological examination reveals no gross focal deficits





- Labs


CBC & Chem 7: 


                                 08/24/22 07:11





                                 08/24/22 07:11





Assessment and Plan


Plan: 








Alcohol intoxication





Early alcohol withdrawal will





Right lower extremity cellulitis with 





Mental status changes with delirium





Underlying history of hypertension





Underlying history of paroxysmal atrial fibrillation not on anticoagulation due 

to multiple falls





Underlying history of depression with anxiety





Underlying history of excessive alcohol use patient was admitted recently with 

alcohol intoxication








At this time patient is admitted to medical floor he was started on CIWA 

protocol with IV Ativan


Will start IV cefazolin for right lower extremity cellulitis


Will check right lower extremity venous Doppler


Recheck labs in a.m.


Prognosis is guarded patient is not receptive to any counseling at this time 

regarding alcohol will try in the next few days

## 2022-08-28 RX ADMIN — SODIUM CHLORIDE SCH MLS/HR: 900 INJECTION, SOLUTION INTRAVENOUS at 10:06

## 2022-08-28 RX ADMIN — THERA TABS SCH EACH: TAB at 08:31

## 2022-08-28 RX ADMIN — HEPARIN SODIUM SCH UNIT: 5000 INJECTION INTRAVENOUS; SUBCUTANEOUS at 17:21

## 2022-08-28 RX ADMIN — GABAPENTIN SCH MG: 100 CAPSULE ORAL at 08:31

## 2022-08-28 RX ADMIN — CEFEPIME HYDROCHLORIDE SCH MLS/HR: 2 INJECTION, POWDER, FOR SOLUTION INTRAVENOUS at 05:23

## 2022-08-28 RX ADMIN — GABAPENTIN SCH MG: 100 CAPSULE ORAL at 17:21

## 2022-08-28 RX ADMIN — HEPARIN SODIUM SCH UNIT: 5000 INJECTION INTRAVENOUS; SUBCUTANEOUS at 22:40

## 2022-08-28 RX ADMIN — FAMOTIDINE SCH MG: 20 TABLET, FILM COATED ORAL at 08:31

## 2022-08-28 RX ADMIN — NYSTATIN SCH UNIT: 100000 SUSPENSION ORAL at 22:40

## 2022-08-28 RX ADMIN — NYSTATIN SCH UNIT: 100000 SUSPENSION ORAL at 17:21

## 2022-08-28 RX ADMIN — CEFEPIME HYDROCHLORIDE SCH MLS/HR: 2 INJECTION, POWDER, FOR SOLUTION INTRAVENOUS at 21:42

## 2022-08-28 RX ADMIN — SODIUM CHLORIDE SCH MLS/HR: 900 INJECTION, SOLUTION INTRAVENOUS at 20:36

## 2022-08-28 RX ADMIN — PANTOPRAZOLE SODIUM SCH MG: 40 TABLET, DELAYED RELEASE ORAL at 08:31

## 2022-08-28 RX ADMIN — GABAPENTIN SCH MG: 100 CAPSULE ORAL at 20:36

## 2022-08-28 RX ADMIN — HEPARIN SODIUM SCH UNIT: 5000 INJECTION INTRAVENOUS; SUBCUTANEOUS at 08:32

## 2022-08-28 RX ADMIN — CEFEPIME HYDROCHLORIDE SCH MLS/HR: 2 INJECTION, POWDER, FOR SOLUTION INTRAVENOUS at 12:19

## 2022-08-28 RX ADMIN — NYSTATIN SCH UNIT: 100000 SUSPENSION ORAL at 13:30

## 2022-08-28 NOTE — P.PN
Subjective


Progress Note Date: 08/28/22


Principal diagnosis: 





R leg wound and cellulitis


Patient is a 71-year-old  male presenting to the hospital with multiple

complaints including wound to the right lower extremity and concerning for 

secondary cellulitis.


On today's evaluation that is 8/28/2022, the patient denies any fever or any 

chills, the patient is breathing comfortably on room air, the patient denies 

chest pain or shortness of breath , the patient did have occasional cough, the 

patient has been combining of more pain to bilateral knee area however right 

lower leg pain has decreased in intensity





Objective





- Vital Signs


Vital signs: 


                                   Vital Signs











Temp  98.0 F   08/28/22 08:00


 


Pulse  73   08/28/22 08:00


 


Resp  18   08/28/22 08:59


 


BP  147/83   08/28/22 08:00


 


Pulse Ox  95   08/28/22 08:00


 


FiO2      








                                 Intake & Output











 08/27/22 08/28/22 08/28/22





 18:59 06:59 18:59


 


Intake Total   400


 


Output Total 1000  


 


Balance -1000  400


 


Intake:   


 


  Oral   400


 


Output:   


 


  Urine 1000  


 


Other:   


 


  Voiding Method Urinal Urinal Urinal


 


  # Voids 3 3 


 


  # Bowel Movements 1  














- Exam


GENERAL DESCRIPTION: Elderly male lying in bed, no distress. No tachypnea or 

accessory muscle of respiration use.


 LUNGS: Unlabored breathing. Clear to auscultation anteriorly. No wheeze or 

crackle.


HEART: S1, S2, regular rate and rhythm. No loud murmur


ABDOMEN: Soft, no tenderness , guarding or rigidity, no organomegaly


EXTREMITIES right lower extremity swelling and redness has improved wound has 

decreased in size minimal blood stained drainage





- Labs


CBC & Chem 7: 


                                 08/24/22 07:11





                                 08/24/22 07:11





Assessment and Plan


(1) Cellulitis


Current Visit: No   Status: Acute   Code(s): L03.90 - CELLULITIS, UNSPECIFIED   

SNOMED Code(s): 581731617


   


Plan: 


1patient with right lower extremity wound and secondary cellulitis local wound 

cultures are currently growing multiple pathogens including Acinetobacter 

Morganella which is sensitive to cefepime , patient did have overall clinical 

improvement and will continue with cefepime while  inpatient with the plan to 

finish therapy with oral antibiotics in the form of Cipro and Keflex 7 days


2-local wound care will be continued with Aquacel silver dressing to be changed 

every 48 hour


Time with Patient: Less than 30

## 2022-08-28 NOTE — P.PN
Subjective


Progress Note Date: 08/28/22








Roshan Mir, he is a 71-year-old male who presented to [facility] emergency room with a chief complaint of alcohol intoxication and 

confusion, his living condition were not sanitary, he had a large ulcer on the 

medial aspect of the right ankle.


He was evaluated in the emergency room vital examination on presentation 

revealed a temperature of 98 pulse 84 respiration 20 blood pressure 154/90 and 

pulse ox 96% on room air


Laboratory data reveals a white blood count of 7.5 hemoglobin 16.6 platelet 

count 84 sodium 133 potassium 3.9 chloride 95 CO2 21 BUN 9 creatinine 0.88 

troponin level was 0.0-3


Testing in the emergency room revealed computed tomography scan of the head and 

neck done in the emergency room revealed cerebral atrophy and chronic small 

vessel ischemia no acute intracranial abnormality no change compared to old 

exam, chest x-ray done in the emergency room revealed chronic elevation of the 

right diaphragmatic no change compared to old exam no acute lung disease, EKG 

done in the emergency room revealed supraventricular rhythm with possible 

lateral ischemia


Patient was admitted to medical floor for further evaluation and treatment


On review of systems patient is complaining of right foot and ankle pain 

otherwise he denies any complaints there is no fever or chills no headache or 

dizziness no chest pain no shortness of breath no cough no nausea or vomiting no

abdominal pain no diarrhea and no urinary symptoms.





On 08/19/2022 patient was seen and examined on the medical floor he is alert and

responsive in no apparent distress he is complaining of generalized joint pain 

mostly in bilateral knees and his hands joints, otherwise he denies any 

complaints there is no fever or chills no headache or dizziness no chest pain no

shortness of breath no cough no nausea or vomiting no abdominal pain no diarrhea

and no urinary symptoms.  At this point will check uric acid level will give 1 

dose of IV Toradol and continue to monitor closely.  Continue with IV cefazolin 

for lower extremity cellulitis awaiting input from infectious disease








On 08/20/2022 patient was seen and examined on the medical floor he is alert and

responsive in no apparent distress he is complaining of lower extremity pain, 

otherwise he denies any complaints there is no fever or chills no headache or 

dizziness no chest pain no shortness of breath no cough no nausea or vomiting no

abdominal pain no diarrhea and no urinary symptoms. Patient see n by Dr Baez, 

wound culture taken.  Continue with IV cefazolin for lower extremity cellulitis 

awaiting input from infectious disease.








On 08/21/2022 patient was seen and examined on the medical floor he is alert and

responsive in no apparent distress he was having some episodes of agitation last

night he is still receiving IV Ativan, patient is still receiving IV antibiotic 

for lower extremity cellulitis with ulcer otherwise patient denies any 

complaints there is no fever or chills no headache or dizziness no chest pain no

shortness of breath no cough no nausea or vomiting no abdominal pain no diarrhea

and no urinary symptoms





On 08/22/2022 patient was seen and examined on the medical floor, he is alert 

and oriented 3 in no apparent distress, there is no fever or chills no headache

or dizziness no chest pain no shortness of breath no cough no nausea or vomiting

no abdominal pain no diarrhea and no urinary symptoms, he has good urine output,

nephrology are following and no hemodialysis is recommended at this time, physic

al therapy and occupational therapy are following.





On 08/23/2022 patient was seen and examined on the medical floor he is alert and

oriented 3 in no apparent distress, he is still complaining of lower extremity 

pain and difficulty walking otherwise he denies any complaints there is no fever

or chills no headache or dizziness no chest pain no shortness of breath no cough

no nausea or vomiting no abdominal pain no diarrhea and no urinary symptoms.  

physical therapy were consult patient may need to go to rehab after the acute 

admission








On 08/24/2022 patient was seen and examined on the medical floor he is alert and

oriented 3 in no distress he is complaining of ankle pain and difficulty st

anding and walking otherwise he denies any complaints there is no fever or 

chills no headache or dizziness no chest pain no shortness of breath no cough no

nausea or vomiting no abdominal pain no diarrhea no blood in the stools no 

burning with urination no frequency or urgency no hematuria he is followed by 

physical therapy he would need to be transferred to a rehab unit when stable.





On 08/25/2022 patient was seen and examined on the medical floor, he is alert 

and oriented 3 in no apparent distress, there is no fever or chills no headache

or dizziness no chest pain no shortness of breath no cough no nausea or vomiting

no abdominal pain no diarrhea and no urinary symptoms. physical therapy and 

occupational therapy are following.








On 08/26/2022 patient was seen and examined on the medical floor, he is alert 

and oriented, there is no fever or chills no headache or dizziness no chest pain

no shortness of breath no cough no nausea or vomiting no abdominal pain no 

diarrhea and no urinary symptoms, he has good urine output, , physical therapy 

and occupational therapy are following.  We are awaiting nursing home placement








On 08/27/2022 patient was seen and examined on the medical floor he is alert and

oriented in no distress he is still complaining of generalized weakness and gait

disturbance is also complaining of severe pain at the site of his foot ulcer 

otherwise he denies any complaints there is no fever or chills no headache or 

dizziness no chest pain no shortness of breath no cough no nausea or vomiting no

abdominal pain no diarrhea and no urinary symptoms.





On 8/28 2022 patient was seen and examined on the medical floor he is alert and 

oriented 3 in no apparent distress there is no fever or chills no headache or 

dizziness no chest pain no shortness of breath no cough no nausea or vomiting no

abdominal pain no diarrhea and no urinary symptoms.  He is still complaining of 

generalized weakness and inability to walk he is complaining of pain at the site

of his lower extremity ulcer otherwise he denies any complaints additionally 

there is evidence of thrush at this time





Objective





- Vital Signs


Vital signs: 


                                   Vital Signs











Temp  98.0 F   08/28/22 08:00


 


Pulse  73   08/28/22 08:00


 


Resp  18   08/28/22 08:59


 


BP  147/83   08/28/22 08:00


 


Pulse Ox  95   08/28/22 08:00


 


FiO2      








                                 Intake & Output











 08/27/22 08/28/22 08/28/22





 18:59 06:59 18:59


 


Intake Total   200


 


Output Total 1000  


 


Balance -1000  200


 


Intake:   


 


  Oral   200


 


Output:   


 


  Urine 1000  


 


Other:   


 


  Voiding Method Urinal Urinal Urinal


 


  # Voids 3 3 


 


  # Bowel Movements 1  














- Exam








In general patient is alert and oriented x 3 in no distress


HEENT head normocephalic and atraumatic


Neck is supple no JVD no goiter no lymphadenopathy no carotid bruit


Chest examination is clear to auscultation no crackles no wheezing


Cardiac exam reveals regular heart sounds S1 and S2 no gallops no murmurs


Abdomen is soft nontender no organomegaly with normal bowel sounds


Extremity exam reveals no edema no cyanosis or clubbing right foot erythema and 

medial right ankle scabbed ulcer


Neurological examination reveals no gross focal deficits





- Labs


CBC & Chem 7: 


                                 08/24/22 07:11





                                 08/24/22 07:11





Assessment and Plan


Plan: 








Alcohol intoxication





Early alcohol withdrawal will





Right lower extremity cellulitis with 





Mental status changes with delirium





Underlying history of hypertension





Underlying history of paroxysmal atrial fibrillation not on anticoagulation due 

to multiple falls





Underlying history of depression with anxiety





Underlying history of excessive alcohol use patient was admitted recently with 

alcohol intoxication








At this time patient is admitted to medical floor he was started on CIWA 

protocol with IV Ativan


Will start IV cefazolin for right lower extremity cellulitis


Will check right lower extremity venous Doppler


Recheck labs in a.m.


Prognosis is guarded patient is not receptive to any counseling at this time 

regarding alcohol will try in the next few days normal for race

## 2022-08-29 LAB
ALBUMIN SERPL-MCNC: 3.9 G/DL (ref 3.8–4.9)
ALBUMIN/GLOB SERPL: 1.44 G/DL (ref 1.6–3.17)
ALP SERPL-CCNC: 93 U/L (ref 41–126)
ALT SERPL-CCNC: 33 U/L (ref 10–49)
ANION GAP SERPL CALC-SCNC: 8.9 MMOL/L (ref 10–18)
AST SERPL-CCNC: 27 U/L (ref 14–35)
BASOPHILS # BLD AUTO: 0.07 X 10*3/UL (ref 0–0.1)
BASOPHILS NFR BLD AUTO: 1.4 %
BUN SERPL-SCNC: 24.2 MG/DL (ref 9–27)
BUN/CREAT SERPL: 26.89 RATIO (ref 12–20)
CALCIUM SPEC-MCNC: 9 MG/DL (ref 8.7–10.3)
CHLORIDE SERPL-SCNC: 104 MMOL/L (ref 96–109)
CO2 SERPL-SCNC: 27.1 MMOL/L (ref 20–27.5)
EOSINOPHIL # BLD AUTO: 0.12 X 10*3/UL (ref 0.04–0.35)
EOSINOPHIL NFR BLD AUTO: 2.5 %
ERYTHROCYTE [DISTWIDTH] IN BLOOD BY AUTOMATED COUNT: 4.83 X 10*6/UL (ref 4.4–5.6)
ERYTHROCYTE [DISTWIDTH] IN BLOOD: 13.8 % (ref 11.5–14.5)
GLOBULIN SER CALC-MCNC: 2.7 G/DL (ref 1.6–3.3)
GLUCOSE SERPL-MCNC: 107 MG/DL (ref 70–110)
HCT VFR BLD AUTO: 45.5 % (ref 39.6–50)
HGB BLD-MCNC: 14.7 G/DL (ref 13–17)
IMM GRANULOCYTES BLD QL AUTO: 3.9 %
LYMPHOCYTES # SPEC AUTO: 1.38 X 10*3/UL (ref 0.9–5)
LYMPHOCYTES NFR SPEC AUTO: 28.2 %
MCH RBC QN AUTO: 30.4 PG (ref 27–32)
MCHC RBC AUTO-ENTMCNC: 32.3 G/DL (ref 32–37)
MCV RBC AUTO: 94.2 FL (ref 80–97)
MONOCYTES # BLD AUTO: 0.4 X 10*3/UL (ref 0.2–1)
MONOCYTES NFR BLD AUTO: 8.2 %
NEUTROPHILS # BLD AUTO: 2.73 X 10*3/UL (ref 1.8–7.7)
NEUTROPHILS NFR BLD AUTO: 55.8 %
NRBC BLD AUTO-RTO: 0 /100 WBCS (ref 0–0)
PLATELET # BLD AUTO: 325 X 10*3/UL (ref 140–440)
POTASSIUM SERPL-SCNC: 4.4 MMOL/L (ref 3.5–5.5)
PROT SERPL-MCNC: 6.6 G/DL (ref 6.2–8.2)
SODIUM SERPL-SCNC: 140 MMOL/L (ref 135–145)
WBC # BLD AUTO: 4.89 X 10*3/UL (ref 4.5–10)

## 2022-08-29 RX ADMIN — HEPARIN SODIUM SCH: 5000 INJECTION INTRAVENOUS; SUBCUTANEOUS at 23:28

## 2022-08-29 RX ADMIN — PANTOPRAZOLE SODIUM SCH MG: 40 TABLET, DELAYED RELEASE ORAL at 08:42

## 2022-08-29 RX ADMIN — CEFEPIME HYDROCHLORIDE SCH MLS/HR: 2 INJECTION, POWDER, FOR SOLUTION INTRAVENOUS at 19:19

## 2022-08-29 RX ADMIN — CEFEPIME HYDROCHLORIDE SCH MLS/HR: 2 INJECTION, POWDER, FOR SOLUTION INTRAVENOUS at 11:15

## 2022-08-29 RX ADMIN — GABAPENTIN SCH MG: 100 CAPSULE ORAL at 08:42

## 2022-08-29 RX ADMIN — HEPARIN SODIUM SCH UNIT: 5000 INJECTION INTRAVENOUS; SUBCUTANEOUS at 15:31

## 2022-08-29 RX ADMIN — CEFEPIME HYDROCHLORIDE SCH MLS/HR: 2 INJECTION, POWDER, FOR SOLUTION INTRAVENOUS at 04:39

## 2022-08-29 RX ADMIN — HEPARIN SODIUM SCH UNIT: 5000 INJECTION INTRAVENOUS; SUBCUTANEOUS at 08:42

## 2022-08-29 RX ADMIN — SODIUM CHLORIDE SCH MLS/HR: 900 INJECTION, SOLUTION INTRAVENOUS at 08:35

## 2022-08-29 RX ADMIN — GABAPENTIN SCH MG: 100 CAPSULE ORAL at 20:43

## 2022-08-29 RX ADMIN — THERA TABS SCH EACH: TAB at 08:42

## 2022-08-29 RX ADMIN — NYSTATIN SCH UNIT: 100000 SUSPENSION ORAL at 20:43

## 2022-08-29 RX ADMIN — FAMOTIDINE SCH MG: 20 TABLET, FILM COATED ORAL at 08:42

## 2022-08-29 RX ADMIN — SODIUM CHLORIDE SCH MLS/HR: 900 INJECTION, SOLUTION INTRAVENOUS at 23:27

## 2022-08-29 RX ADMIN — NYSTATIN SCH UNIT: 100000 SUSPENSION ORAL at 12:00

## 2022-08-29 RX ADMIN — NYSTATIN SCH UNIT: 100000 SUSPENSION ORAL at 08:43

## 2022-08-29 RX ADMIN — GABAPENTIN SCH MG: 100 CAPSULE ORAL at 15:31

## 2022-08-29 RX ADMIN — NYSTATIN SCH UNIT: 100000 SUSPENSION ORAL at 17:00

## 2022-08-29 NOTE — P.DS
Providers


Date of admission: 


08/18/22 02:49





Expected date of discharge: 08/29/22


Attending physician: 


Rajinder Foreman





Consults: 





                                        





08/18/22 19:43


Consult Physician Routine 


   Consulting Provider: Haley Baez


   Consult Reason/Comments: Lower extremity cellulitis


   Do you want consulting provider notified?: Yes





08/26/22 13:53


Consult Physician Routine 


   Consulting Provider: Martinez Lawler


   Consult Reason/Comments: gait disturbance


   Do you want consulting provider notified?: Yes











Primary care physician: 


Stated None





Hospital Course: 








Diagnosis on discharge:








Alcohol intoxication





Early alcohol withdrawal will





Right lower extremity cellulitis with open ulcer





oral candidiasis





Mental status changes with delirium





Underlying history of hypertension





Underlying history of paroxysmal atrial fibrillation not on anticoagulation due 

to multiple falls





Underlying history of depression with anxiety





Underlying history of excessive alcohol use patient was admitted recently with 

alcohol intoxication























Hospital course:








Roshan Mir, he is a 71-year-old male who presented to [facility] emergency room with a chief complaint of alcohol intoxication and 

confusion, his living condition were not sanitary, he had a large ulcer on the 

medial aspect of the right ankle.


He was evaluated in the emergency room vital examination on presentation 

revealed a temperature of 98 pulse 84 respiration 20 blood pressure 154/90 and 

pulse ox 96% on room air


Laboratory data reveals a white blood count of 7.5 hemoglobin 16.6 platelet 

count 84 sodium 133 potassium 3.9 chloride 95 CO2 21 BUN 9 creatinine 0.88 

troponin level was 0.0-3


Testing in the emergency room revealed computed tomography scan of the head and 

neck done in the emergency room revealed cerebral atrophy and chronic small 

vessel ischemia no acute intracranial abnormality no change compared to old 

exam, chest x-ray done in the emergency room revealed chronic elevation of the 

right diaphragmatic no change compared to old exam no acute lung disease, EKG 

done in the emergency room revealed supraventricular rhythm with possible 

lateral ischemia


Patient was admitted to medical floor for further evaluation and treatment


On review of systems patient is complaining of right foot and ankle pain 

otherwise he denies any complaints there is no fever or chills no headache or 

dizziness no chest pain no shortness of breath no cough no nausea or vomiting no

abdominal pain no diarrhea and no urinary symptoms.





On 08/19/2022 patient was seen and examined on the medical floor he is alert and

responsive in no apparent distress he is complaining of generalized joint pain 

mostly in bilateral knees and his hands joints, otherwise he denies any 

complaints there is no fever or chills no headache or dizziness no chest pain no

shortness of breath no cough no nausea or vomiting no abdominal pain no diarrhea

and no urinary symptoms.  At this point will check uric acid level will give 1 

dose of IV Toradol and continue to monitor closely.  Continue with IV cefazolin 

for lower extremity cellulitis awaiting input from infectious disease








On 08/20/2022 patient was seen and examined on the medical floor he is alert and

responsive in no apparent distress he is complaining of lower extremity pain, 

otherwise he denies any complaints there is no fever or chills no headache or 

dizziness no chest pain no shortness of breath no cough no nausea or vomiting no

abdominal pain no diarrhea and no urinary symptoms. Patient see n by Dr Baez, 

wound culture taken.  Continue with IV cefazolin for lower extremity cellulitis 

awaiting input from infectious disease.








On 08/21/2022 patient was seen and examined on the medical floor he is alert and

responsive in no apparent distress he was having some episodes of agitation last

night he is still receiving IV Ativan, patient is still receiving IV antibiotic 

for lower extremity cellulitis with ulcer otherwise patient denies any 

complaints there is no fever or chills no headache or dizziness no chest pain no

shortness of breath no cough no nausea or vomiting no abdominal pain no diarrhea

and no urinary symptoms





On 08/22/2022 patient was seen and examined on the medical floor, he is alert 

and oriented 3 in no apparent distress, there is no fever or chills no headache

or dizziness no chest pain no shortness of breath no cough no nausea or vomiting

no abdominal pain no diarrhea and no urinary symptoms, he has good urine output,

nephrology are following and no hemodialysis is recommended at this time, 

physical therapy and occupational therapy are following.





On 08/23/2022 patient was seen and examined on the medical floor he is alert and

oriented 3 in no apparent distress, he is still complaining of lower extremity 

pain and difficulty walking otherwise he denies any complaints there is no fever

or chills no headache or dizziness no chest pain no shortness of breath no cough

no nausea or vomiting no abdominal pain no diarrhea and no urinary symptoms.  

physical therapy were consult patient may need to go to rehab after the acute 

admission








On 08/24/2022 patient was seen and examined on the medical floor he is alert and

oriented 3 in no distress he is complaining of ankle pain and difficulty 

standing and walking otherwise he denies any complaints there is no fever or 

chills no headache or dizziness no chest pain no shortness of breath no cough no

nausea or vomiting no abdominal pain no diarrhea no blood in the stools no 

burning with urination no frequency or urgency no hematuria he is followed by 

physical therapy he would need to be transferred to a rehab unit when stable.





On 08/25/2022 patient was seen and examined on the medical floor, he is alert 

and oriented 3 in no apparent distress, there is no fever or chills no headache

or dizziness no chest pain no shortness of breath no cough no nausea or vomiting

no abdominal pain no diarrhea and no urinary symptoms. physical therapy and occ

upational therapy are following.








On 08/26/2022 patient was seen and examined on the medical floor, he is alert 

and oriented, there is no fever or chills no headache or dizziness no chest pain

no shortness of breath no cough no nausea or vomiting no abdominal pain no 

diarrhea and no urinary symptoms, he has good urine output, , physical therapy 

and occupational therapy are following.  We are awaiting nursing home placement








On 08/27/2022 patient was seen and examined on the medical floor he is alert and

oriented in no distress he is still complaining of generalized weakness and gait

disturbance is also complaining of severe pain at the site of his foot ulcer 

otherwise he denies any complaints there is no fever or chills no headache or 

dizziness no chest pain no shortness of breath no cough no nausea or vomiting no

abdominal pain no diarrhea and no urinary symptoms.





On 8/28 2022 patient was seen and examined on the medical floor he is alert and 

oriented 3 in no apparent distress there is no fever or chills no headache or 

dizziness no chest pain no shortness of breath no cough no nausea or vomiting no

abdominal pain no diarrhea and no urinary symptoms.  He is still complaining of 

generalized weakness and inability to walk he is complaining of pain at the site

of his lower extremity ulcer otherwise he denies any complaints additionally 

there is evidence of thrush at this time





Patient Condition at Discharge: Stable





Plan - Discharge Summary


Discharge Rx Participant: No


New Discharge Prescriptions: 


New


   Cephalexin [Keflex] 500 mg PO Q8HR 10 Days #30 cap


   Multivitamins, Thera [Multivitamin (formulary)] 1 each PO DAILY  tab


   Pantoprazole [Protonix] 40 mg PO AC-BRKFST  tab


   Nystatin 100,000 Unit/ml Susp [Mycostatin Oral Susp] 500,000 unit PO QID  ml


   LORazepam [Ativan] 0.5 mg PO QID PRN  tab


     PRN Reason: Anxiety


   Ciprofloxacin HCl [Cipro] 250 mg PO Q12HR 10 Days #20 tab





Continue


   carvediloL [Coreg*] 12.5 mg PO BID-W/MEALS #60 tab


   amLODIPine [Norvasc] 10 mg PO DAILY #30 tab


   Acetaminophen Tab [Tylenol] 650 mg PO Q6HR PRN  tab


     PRN Reason: Fever And/ Or Pain


   Famotidine [Pepcid] 20 mg PO DAILY #30 tab


   Thiamine [Vitamin B-1] 100 mg PO BID-W/MEALS #60 tab


   Gabapentin [Neurontin] 100 mg PO TID  cap


   allopurinoL [Zyloprim] 300 mg PO DAILY  tab


Discharge Medication List





Acetaminophen Tab [Tylenol] 650 mg PO Q6HR PRN  tab 07/19/22 [Rx]


Famotidine [Pepcid] 20 mg PO DAILY #30 tab 07/19/22 [Rx]


Thiamine [Vitamin B-1] 100 mg PO BID-W/MEALS #60 tab 07/19/22 [Rx]


amLODIPine [Norvasc] 10 mg PO DAILY #30 tab 07/19/22 [Rx]


carvediloL [Coreg*] 12.5 mg PO BID-W/MEALS #60 tab 07/19/22 [Rx]


Gabapentin [Neurontin] 100 mg PO TID  cap 07/26/22 [Rx]


allopurinoL [Zyloprim] 300 mg PO DAILY  tab 07/26/22 [Rx]


Cephalexin [Keflex] 500 mg PO Q8HR 10 Days #30 cap 08/25/22 [Rx]


Ciprofloxacin HCl [Cipro] 250 mg PO Q12HR 10 Days #20 tab 08/25/22 [Rx]


LORazepam [Ativan] 0.5 mg PO QID PRN  tab 08/25/22 [Rx]


Multivitamins, Thera [Multivitamin (formulary)] 1 each PO DAILY  tab 08/25/22 

[Rx]


Pantoprazole [Protonix] 40 mg PO AC-BRKFST  tab 08/25/22 [Rx]


Nystatin 100,000 Unit/ml Susp [Mycostatin Oral Susp] 500,000 unit PO QID  ml 

08/29/22 [Rx]








Follow up Appointment(s)/Referral(s): 


[facility], [NON-STAFF] - 1-2 Days


Rajinder Foreman MD [STAFF PHYSICIAN] - 09/01/22 2:00 pm
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
## 2022-08-29 NOTE — P.PN
Progress Note - Text





PT/OT both report poor endurance for therapy. Poor discharge plan, so recommend 

[facility], currently.

## 2022-08-30 RX ADMIN — NYSTATIN SCH UNIT: 100000 SUSPENSION ORAL at 08:16

## 2022-08-30 RX ADMIN — SODIUM CHLORIDE SCH MLS/HR: 900 INJECTION, SOLUTION INTRAVENOUS at 23:24

## 2022-08-30 RX ADMIN — CEFEPIME HYDROCHLORIDE SCH MLS/HR: 2 INJECTION, POWDER, FOR SOLUTION INTRAVENOUS at 03:18

## 2022-08-30 RX ADMIN — FAMOTIDINE SCH MG: 20 TABLET, FILM COATED ORAL at 08:17

## 2022-08-30 RX ADMIN — GABAPENTIN SCH MG: 100 CAPSULE ORAL at 20:15

## 2022-08-30 RX ADMIN — GABAPENTIN SCH MG: 100 CAPSULE ORAL at 08:16

## 2022-08-30 RX ADMIN — GABAPENTIN SCH MG: 100 CAPSULE ORAL at 15:04

## 2022-08-30 RX ADMIN — PANTOPRAZOLE SODIUM SCH MG: 40 TABLET, DELAYED RELEASE ORAL at 08:16

## 2022-08-30 RX ADMIN — THERA TABS SCH EACH: TAB at 08:17

## 2022-08-30 RX ADMIN — NYSTATIN SCH UNIT: 100000 SUSPENSION ORAL at 17:31

## 2022-08-30 RX ADMIN — HEPARIN SODIUM SCH: 5000 INJECTION INTRAVENOUS; SUBCUTANEOUS at 15:04

## 2022-08-30 RX ADMIN — CEFEPIME HYDROCHLORIDE SCH MLS/HR: 2 INJECTION, POWDER, FOR SOLUTION INTRAVENOUS at 20:15

## 2022-08-30 RX ADMIN — SODIUM CHLORIDE SCH MLS/HR: 900 INJECTION, SOLUTION INTRAVENOUS at 08:15

## 2022-08-30 RX ADMIN — CEFEPIME HYDROCHLORIDE SCH MLS/HR: 2 INJECTION, POWDER, FOR SOLUTION INTRAVENOUS at 11:13

## 2022-08-30 RX ADMIN — NYSTATIN SCH UNIT: 100000 SUSPENSION ORAL at 13:04

## 2022-08-30 RX ADMIN — HEPARIN SODIUM SCH UNIT: 5000 INJECTION INTRAVENOUS; SUBCUTANEOUS at 08:15

## 2022-08-30 RX ADMIN — HEPARIN SODIUM SCH: 5000 INJECTION INTRAVENOUS; SUBCUTANEOUS at 23:25

## 2022-08-30 RX ADMIN — NYSTATIN SCH UNIT: 100000 SUSPENSION ORAL at 20:16

## 2022-08-30 NOTE — P.PN
Subjective


Progress Note Date: 08/29/22








Roshan Mir, he is a 71-year-old male who presented to [facility] emergency room with a chief complaint of alcohol intoxication and 

confusion, his living condition were not sanitary, he had a large ulcer on the 

medial aspect of the right ankle.


He was evaluated in the emergency room vital examination on presentation 

revealed a temperature of 98 pulse 84 respiration 20 blood pressure 154/90 and 

pulse ox 96% on room air


Laboratory data reveals a white blood count of 7.5 hemoglobin 16.6 platelet 

count 84 sodium 133 potassium 3.9 chloride 95 CO2 21 BUN 9 creatinine 0.88 

troponin level was 0.0-3


Testing in the emergency room revealed computed tomography scan of the head and 

neck done in the emergency room revealed cerebral atrophy and chronic small 

vessel ischemia no acute intracranial abnormality no change compared to old 

exam, chest x-ray done in the emergency room revealed chronic elevation of the 

right diaphragmatic no change compared to old exam no acute lung disease, EKG 

done in the emergency room revealed supraventricular rhythm with possible 

lateral ischemia


Patient was admitted to medical floor for further evaluation and treatment


On review of systems patient is complaining of right foot and ankle pain 

otherwise he denies any complaints there is no fever or chills no headache or 

dizziness no chest pain no shortness of breath no cough no nausea or vomiting no

abdominal pain no diarrhea and no urinary symptoms.





On 08/19/2022 patient was seen and examined on the medical floor he is alert and

responsive in no apparent distress he is complaining of generalized joint pain 

mostly in bilateral knees and his hands joints, otherwise he denies any 

complaints there is no fever or chills no headache or dizziness no chest pain no

shortness of breath no cough no nausea or vomiting no abdominal pain no diarrhea

and no urinary symptoms.  At this point will check uric acid level will give 1 

dose of IV Toradol and continue to monitor closely.  Continue with IV cefazolin 

for lower extremity cellulitis awaiting input from infectious disease








On 08/20/2022 patient was seen and examined on the medical floor he is alert and

responsive in no apparent distress he is complaining of lower extremity pain, 

otherwise he denies any complaints there is no fever or chills no headache or 

dizziness no chest pain no shortness of breath no cough no nausea or vomiting no

abdominal pain no diarrhea and no urinary symptoms. Patient see n by Dr Baez, 

wound culture taken.  Continue with IV cefazolin for lower extremity cellulitis 

awaiting input from infectious disease.








On 08/21/2022 patient was seen and examined on the medical floor he is alert and

responsive in no apparent distress he was having some episodes of agitation last

night he is still receiving IV Ativan, patient is still receiving IV antibiotic 

for lower extremity cellulitis with ulcer otherwise patient denies any 

complaints there is no fever or chills no headache or dizziness no chest pain no

shortness of breath no cough no nausea or vomiting no abdominal pain no diarrhea

and no urinary symptoms





On 08/22/2022 patient was seen and examined on the medical floor, he is alert 

and oriented 3 in no apparent distress, there is no fever or chills no headache

or dizziness no chest pain no shortness of breath no cough no nausea or vomiting

no abdominal pain no diarrhea and no urinary symptoms, he has good urine output,

nephrology are following and no hemodialysis is recommended at this time, physic

al therapy and occupational therapy are following.





On 08/23/2022 patient was seen and examined on the medical floor he is alert and

oriented 3 in no apparent distress, he is still complaining of lower extremity 

pain and difficulty walking otherwise he denies any complaints there is no fever

or chills no headache or dizziness no chest pain no shortness of breath no cough

no nausea or vomiting no abdominal pain no diarrhea and no urinary symptoms.  

physical therapy were consult patient may need to go to rehab after the acute 

admission








On 08/24/2022 patient was seen and examined on the medical floor he is alert and

oriented 3 in no distress he is complaining of ankle pain and difficulty st

anding and walking otherwise he denies any complaints there is no fever or 

chills no headache or dizziness no chest pain no shortness of breath no cough no

nausea or vomiting no abdominal pain no diarrhea no blood in the stools no 

burning with urination no frequency or urgency no hematuria he is followed by 

physical therapy he would need to be transferred to a rehab unit when stable.





On 08/25/2022 patient was seen and examined on the medical floor, he is alert 

and oriented 3 in no apparent distress, there is no fever or chills no headache

or dizziness no chest pain no shortness of breath no cough no nausea or vomiting

no abdominal pain no diarrhea and no urinary symptoms. physical therapy and 

occupational therapy are following.








On 08/26/2022 patient was seen and examined on the medical floor, he is alert 

and oriented, there is no fever or chills no headache or dizziness no chest pain

no shortness of breath no cough no nausea or vomiting no abdominal pain no 

diarrhea and no urinary symptoms, he has good urine output, , physical therapy 

and occupational therapy are following.  We are awaiting nursing home placement








On 08/27/2022 patient was seen and examined on the medical floor he is alert and

oriented in no distress he is still complaining of generalized weakness and gait

disturbance is also complaining of severe pain at the site of his foot ulcer 

otherwise he denies any complaints there is no fever or chills no headache or 

dizziness no chest pain no shortness of breath no cough no nausea or vomiting no

abdominal pain no diarrhea and no urinary symptoms.





On 8/28 2022 patient was seen and examined on the medical floor he is alert and 

oriented 3 in no apparent distress there is no fever or chills no headache or 

dizziness no chest pain no shortness of breath no cough no nausea or vomiting no

abdominal pain no diarrhea and no urinary symptoms.  He is still complaining of 

generalized weakness and inability to walk he is complaining of pain at the site

of his lower extremity ulcer otherwise he denies any complaints additionally 

there is evidence of thrush at this time.





On 08/29/2022 patient was seen and examined on the medical floor he is alert and

oriented 3 in no apparent distress there is no fever or chills no headache or 

dizziness no chest pain soft breath no cough no nausea or vomiting no abdominal 

pain no diarrhea and no urinary symptoms he is still complaining of generalized 

weakness otherwise he denies any complaints.





Objective





- Vital Signs


Vital signs: 


                                   Vital Signs











Temp  98.5 F   08/29/22 08:00


 


Pulse  72   08/29/22 08:00


 


Resp  16   08/29/22 08:00


 


BP  149/81   08/29/22 08:00


 


Pulse Ox  96   08/29/22 08:00


 


FiO2      








                                 Intake & Output











 08/28/22 08/29/22 08/29/22





 18:59 06:59 18:59


 


Intake Total 1000  


 


Output Total  600 


 


Balance 1000 -600 


 


Intake:   


 


  Oral 1000  


 


Output:   


 


  Urine  600 


 


Other:   


 


  Voiding Method Urinal Urinal 


 


  # Voids   1














- Exam








In general patient is alert and oriented x 3 in no distress


HEENT head normocephalic and atraumatic


Neck is supple no JVD no goiter no lymphadenopathy no carotid bruit


Chest examination is clear to auscultation no crackles no wheezing


Cardiac exam reveals regular heart sounds S1 and S2 no gallops no murmurs


Abdomen is soft nontender no organomegaly with normal bowel sounds


Extremity exam reveals no edema no cyanosis or clubbing right foot erythema and 

medial right ankle scabbed ulcer


Neurological examination reveals no gross focal deficits





- Labs


CBC & Chem 7: 


                                 08/29/22 06:06





                                 08/29/22 06:06


Labs: 


                  Abnormal Lab Results - Last 24 Hours (Table)











  08/29/22 08/29/22 Range/Units





  06:06 06:06 


 


MPV  9.4 L   (9.5-12.2)  fL


 


Immature Gran #  0.19 H   (0.00-0.04)  X 10*3/uL


 


Anion Gap   8.90 L  (10.00-18.00)  mmol/L


 


BUN/Creatinine Ratio   26.89 H  (12.00-20.00)  Ratio


 


Albumin/Globulin Ratio   1.44 L  (1.60-3.17)  g/dL














Assessment and Plan


Plan: 








Alcohol intoxication





Early alcohol withdrawal will





Right lower extremity cellulitis with 





Mental status changes with delirium





Underlying history of hypertension





Underlying history of paroxysmal atrial fibrillation not on anticoagulation due 

to multiple falls





Underlying history of depression with anxiety





Underlying history of excessive alcohol use patient was admitted recently with 

alcohol intoxication








At this time patient is admitted to medical floor he was started on CIWA 

protocol with IV Ativan


Will start IV cefazolin for right lower extremity cellulitis


Will check right lower extremity venous Doppler


Recheck labs in a.m.


Prognosis is guarded patient is not receptive to any counseling at this time 

regarding alcohol will try in the next few days

## 2022-08-30 NOTE — P.PN
Subjective


Progress Note Date: 08/30/22








Roshan Mir, he is a 71-year-old male who presented to [facility] emergency room with a chief complaint of alcohol intoxication and 

confusion, his living condition were not sanitary, he had a large ulcer on the 

medial aspect of the right ankle.


He was evaluated in the emergency room vital examination on presentation 

revealed a temperature of 98 pulse 84 respiration 20 blood pressure 154/90 and 

pulse ox 96% on room air


Laboratory data reveals a white blood count of 7.5 hemoglobin 16.6 platelet 

count 84 sodium 133 potassium 3.9 chloride 95 CO2 21 BUN 9 creatinine 0.88 

troponin level was 0.0-3


Testing in the emergency room revealed computed tomography scan of the head and 

neck done in the emergency room revealed cerebral atrophy and chronic small 

vessel ischemia no acute intracranial abnormality no change compared to old 

exam, chest x-ray done in the emergency room revealed chronic elevation of the 

right diaphragmatic no change compared to old exam no acute lung disease, EKG 

done in the emergency room revealed supraventricular rhythm with possible 

lateral ischemia


Patient was admitted to medical floor for further evaluation and treatment


On review of systems patient is complaining of right foot and ankle pain 

otherwise he denies any complaints there is no fever or chills no headache or 

dizziness no chest pain no shortness of breath no cough no nausea or vomiting no

abdominal pain no diarrhea and no urinary symptoms.





On 08/19/2022 patient was seen and examined on the medical floor he is alert and

responsive in no apparent distress he is complaining of generalized joint pain 

mostly in bilateral knees and his hands joints, otherwise he denies any 

complaints there is no fever or chills no headache or dizziness no chest pain no

shortness of breath no cough no nausea or vomiting no abdominal pain no diarrhea

and no urinary symptoms.  At this point will check uric acid level will give 1 

dose of IV Toradol and continue to monitor closely.  Continue with IV cefazolin 

for lower extremity cellulitis awaiting input from infectious disease








On 08/20/2022 patient was seen and examined on the medical floor he is alert and

responsive in no apparent distress he is complaining of lower extremity pain, 

otherwise he denies any complaints there is no fever or chills no headache or 

dizziness no chest pain no shortness of breath no cough no nausea or vomiting no

abdominal pain no diarrhea and no urinary symptoms. Patient see n by Dr Baez, 

wound culture taken.  Continue with IV cefazolin for lower extremity cellulitis 

awaiting input from infectious disease.








On 08/21/2022 patient was seen and examined on the medical floor he is alert and

responsive in no apparent distress he was having some episodes of agitation last

night he is still receiving IV Ativan, patient is still receiving IV antibiotic 

for lower extremity cellulitis with ulcer otherwise patient denies any 

complaints there is no fever or chills no headache or dizziness no chest pain no

shortness of breath no cough no nausea or vomiting no abdominal pain no diarrhea

and no urinary symptoms





On 08/22/2022 patient was seen and examined on the medical floor, he is alert 

and oriented 3 in no apparent distress, there is no fever or chills no headache

or dizziness no chest pain no shortness of breath no cough no nausea or vomiting

no abdominal pain no diarrhea and no urinary symptoms, he has good urine output,

nephrology are following and no hemodialysis is recommended at this time, physic

al therapy and occupational therapy are following.





On 08/23/2022 patient was seen and examined on the medical floor he is alert and

oriented 3 in no apparent distress, he is still complaining of lower extremity 

pain and difficulty walking otherwise he denies any complaints there is no fever

or chills no headache or dizziness no chest pain no shortness of breath no cough

no nausea or vomiting no abdominal pain no diarrhea and no urinary symptoms.  

physical therapy were consult patient may need to go to rehab after the acute 

admission








On 08/24/2022 patient was seen and examined on the medical floor he is alert and

oriented 3 in no distress he is complaining of ankle pain and difficulty st

anding and walking otherwise he denies any complaints there is no fever or 

chills no headache or dizziness no chest pain no shortness of breath no cough no

nausea or vomiting no abdominal pain no diarrhea no blood in the stools no 

burning with urination no frequency or urgency no hematuria he is followed by 

physical therapy he would need to be transferred to a rehab unit when stable.





On 08/25/2022 patient was seen and examined on the medical floor, he is alert 

and oriented 3 in no apparent distress, there is no fever or chills no headache

or dizziness no chest pain no shortness of breath no cough no nausea or vomiting

no abdominal pain no diarrhea and no urinary symptoms. physical therapy and 

occupational therapy are following.








On 08/26/2022 patient was seen and examined on the medical floor, he is alert 

and oriented, there is no fever or chills no headache or dizziness no chest pain

no shortness of breath no cough no nausea or vomiting no abdominal pain no 

diarrhea and no urinary symptoms, he has good urine output, , physical therapy 

and occupational therapy are following.  We are awaiting nursing home placement








On 08/27/2022 patient was seen and examined on the medical floor he is alert and

oriented in no distress he is still complaining of generalized weakness and gait

disturbance is also complaining of severe pain at the site of his foot ulcer 

otherwise he denies any complaints there is no fever or chills no headache or 

dizziness no chest pain no shortness of breath no cough no nausea or vomiting no

abdominal pain no diarrhea and no urinary symptoms.





On 8/28 2022 patient was seen and examined on the medical floor he is alert and 

oriented 3 in no apparent distress there is no fever or chills no headache or 

dizziness no chest pain no shortness of breath no cough no nausea or vomiting no

abdominal pain no diarrhea and no urinary symptoms.  He is still complaining of 

generalized weakness and inability to walk he is complaining of pain at the site

of his lower extremity ulcer otherwise he denies any complaints additionally 

there is evidence of thrush at this time.





On 08/29/2022 patient was seen and examined on the medical floor he is alert and

oriented 3 in no apparent distress there is no fever or chills no headache or 

dizziness no chest pain soft breath no cough no nausea or vomiting no abdominal 

pain no diarrhea and no urinary symptoms he is still complaining of generalized 

weakness otherwise he denies any complaints.





On 08/30/2022 patient was seen and examined on the medical floor he is alert and

oriented 3 in no apparent distress he is complaining of generalized weakness 

and bilateral knee pain otherwise he denies any complaints there is no fever or 

chills no headache or dizziness no chest pain no shortness of breath no cough no

nausea or vomiting no abdominal pain no diarrhea and no urinary symptoms.  At 

this time we are waiting for placement at the rehab facility





Objective





- Vital Signs


Vital signs: 


                                   Vital Signs











Temp  98.2 F   08/30/22 07:36


 


Pulse  69   08/30/22 07:36


 


Resp  18   08/30/22 07:36


 


BP  169/82   08/30/22 07:36


 


Pulse Ox  97   08/30/22 07:36


 


FiO2      








                                 Intake & Output











 08/29/22 08/30/22 08/30/22





 18:59 06:59 18:59


 


Output Total  800 


 


Balance  -800 


 


Output:   


 


  Urine  800 


 


Other:   


 


  Voiding Method  Urinal 


 


  # Voids 1 3 1














- Exam








In general patient is alert and oriented x 3 in no distress


HEENT head normocephalic and atraumatic


Neck is supple no JVD no goiter no lymphadenopathy no carotid bruit


Chest examination is clear to auscultation no crackles no wheezing


Cardiac exam reveals regular heart sounds S1 and S2 no gallops no murmurs


Abdomen is soft nontender no organomegaly with normal bowel sounds


Extremity exam reveals no edema no cyanosis or clubbing right foot erythema and 

medial right ankle scabbed ulcer


Neurological examination reveals no gross focal deficits





- Labs


CBC & Chem 7: 


                                 08/29/22 06:06





                                 08/29/22 06:06





Assessment and Plan


Plan: 








Alcohol intoxication





Early alcohol withdrawal will





Right lower extremity cellulitis with 





Mental status changes with delirium





Underlying history of hypertension





Underlying history of paroxysmal atrial fibrillation not on anticoagulation due 

to multiple falls





Underlying history of depression with anxiety





Underlying history of excessive alcohol use patient was admitted recently with 

alcohol intoxication








At this time patient is admitted to medical floor he was started on CIWA 

protocol with IV Ativan


Will start IV cefazolin for right lower extremity cellulitis


Will check right lower extremity venous Doppler


Recheck labs in a.m.


Prognosis is guarded patient is not receptive to any counseling at this time 

regarding alcohol will try in the next few days

## 2022-08-31 RX ADMIN — NYSTATIN SCH UNIT: 100000 SUSPENSION ORAL at 08:56

## 2022-08-31 RX ADMIN — HEPARIN SODIUM SCH UNIT: 5000 INJECTION INTRAVENOUS; SUBCUTANEOUS at 08:56

## 2022-08-31 RX ADMIN — CEFEPIME HYDROCHLORIDE SCH MLS/HR: 2 INJECTION, POWDER, FOR SOLUTION INTRAVENOUS at 04:47

## 2022-08-31 RX ADMIN — THERA TABS SCH EACH: TAB at 08:55

## 2022-08-31 RX ADMIN — HEPARIN SODIUM SCH UNIT: 5000 INJECTION INTRAVENOUS; SUBCUTANEOUS at 18:12

## 2022-08-31 RX ADMIN — GABAPENTIN SCH MG: 100 CAPSULE ORAL at 21:26

## 2022-08-31 RX ADMIN — SODIUM CHLORIDE SCH MLS/HR: 900 INJECTION, SOLUTION INTRAVENOUS at 21:27

## 2022-08-31 RX ADMIN — CEFEPIME HYDROCHLORIDE SCH MLS/HR: 2 INJECTION, POWDER, FOR SOLUTION INTRAVENOUS at 13:11

## 2022-08-31 RX ADMIN — FAMOTIDINE SCH MG: 20 TABLET, FILM COATED ORAL at 08:55

## 2022-08-31 RX ADMIN — ACETAMINOPHEN PRN MG: 325 TABLET, FILM COATED ORAL at 21:26

## 2022-08-31 RX ADMIN — CEFEPIME HYDROCHLORIDE SCH MLS/HR: 2 INJECTION, POWDER, FOR SOLUTION INTRAVENOUS at 20:31

## 2022-08-31 RX ADMIN — PANTOPRAZOLE SODIUM SCH MG: 40 TABLET, DELAYED RELEASE ORAL at 08:55

## 2022-08-31 RX ADMIN — NYSTATIN SCH UNIT: 100000 SUSPENSION ORAL at 18:12

## 2022-08-31 RX ADMIN — SODIUM CHLORIDE SCH MLS/HR: 900 INJECTION, SOLUTION INTRAVENOUS at 08:56

## 2022-08-31 RX ADMIN — NYSTATIN SCH UNIT: 100000 SUSPENSION ORAL at 13:11

## 2022-08-31 RX ADMIN — GABAPENTIN SCH MG: 100 CAPSULE ORAL at 18:12

## 2022-08-31 RX ADMIN — GABAPENTIN SCH MG: 100 CAPSULE ORAL at 08:56

## 2022-08-31 RX ADMIN — NYSTATIN SCH UNIT: 100000 SUSPENSION ORAL at 21:26

## 2022-08-31 NOTE — P.PN
Subjective


Progress Note Date: 08/31/22








Roshan Mir, he is a 71-year-old male who presented to [facility] emergency room with a chief complaint of alcohol intoxication and 

confusion, his living condition were not sanitary, he had a large ulcer on the 

medial aspect of the right ankle.


He was evaluated in the emergency room vital examination on presentation 

revealed a temperature of 98 pulse 84 respiration 20 blood pressure 154/90 and 

pulse ox 96% on room air


Laboratory data reveals a white blood count of 7.5 hemoglobin 16.6 platelet 

count 84 sodium 133 potassium 3.9 chloride 95 CO2 21 BUN 9 creatinine 0.88 

troponin level was 0.0-3


Testing in the emergency room revealed computed tomography scan of the head and 

neck done in the emergency room revealed cerebral atrophy and chronic small 

vessel ischemia no acute intracranial abnormality no change compared to old 

exam, chest x-ray done in the emergency room revealed chronic elevation of the 

right diaphragmatic no change compared to old exam no acute lung disease, EKG 

done in the emergency room revealed supraventricular rhythm with possible 

lateral ischemia


Patient was admitted to medical floor for further evaluation and treatment


On review of systems patient is complaining of right foot and ankle pain 

otherwise he denies any complaints there is no fever or chills no headache or 

dizziness no chest pain no shortness of breath no cough no nausea or vomiting no

abdominal pain no diarrhea and no urinary symptoms.





On 08/19/2022 patient was seen and examined on the medical floor he is alert and

responsive in no apparent distress he is complaining of generalized joint pain 

mostly in bilateral knees and his hands joints, otherwise he denies any 

complaints there is no fever or chills no headache or dizziness no chest pain no

shortness of breath no cough no nausea or vomiting no abdominal pain no diarrhea

and no urinary symptoms.  At this point will check uric acid level will give 1 

dose of IV Toradol and continue to monitor closely.  Continue with IV cefazolin 

for lower extremity cellulitis awaiting input from infectious disease








On 08/20/2022 patient was seen and examined on the medical floor he is alert and

responsive in no apparent distress he is complaining of lower extremity pain, 

otherwise he denies any complaints there is no fever or chills no headache or 

dizziness no chest pain no shortness of breath no cough no nausea or vomiting no

abdominal pain no diarrhea and no urinary symptoms. Patient see n by Dr Baez, 

wound culture taken.  Continue with IV cefazolin for lower extremity cellulitis 

awaiting input from infectious disease.








On 08/21/2022 patient was seen and examined on the medical floor he is alert and

responsive in no apparent distress he was having some episodes of agitation last

night he is still receiving IV Ativan, patient is still receiving IV antibiotic 

for lower extremity cellulitis with ulcer otherwise patient denies any 

complaints there is no fever or chills no headache or dizziness no chest pain no

shortness of breath no cough no nausea or vomiting no abdominal pain no diarrhea

and no urinary symptoms





On 08/22/2022 patient was seen and examined on the medical floor, he is alert 

and oriented 3 in no apparent distress, there is no fever or chills no headache

or dizziness no chest pain no shortness of breath no cough no nausea or vomiting

no abdominal pain no diarrhea and no urinary symptoms, he has good urine output,

nephrology are following and no hemodialysis is recommended at this time, physic

al therapy and occupational therapy are following.





On 08/23/2022 patient was seen and examined on the medical floor he is alert and

oriented 3 in no apparent distress, he is still complaining of lower extremity 

pain and difficulty walking otherwise he denies any complaints there is no fever

or chills no headache or dizziness no chest pain no shortness of breath no cough

no nausea or vomiting no abdominal pain no diarrhea and no urinary symptoms.  

physical therapy were consult patient may need to go to rehab after the acute 

admission








On 08/24/2022 patient was seen and examined on the medical floor he is alert and

oriented 3 in no distress he is complaining of ankle pain and difficulty st

anding and walking otherwise he denies any complaints there is no fever or 

chills no headache or dizziness no chest pain no shortness of breath no cough no

nausea or vomiting no abdominal pain no diarrhea no blood in the stools no 

burning with urination no frequency or urgency no hematuria he is followed by 

physical therapy he would need to be transferred to a rehab unit when stable.





On 08/25/2022 patient was seen and examined on the medical floor, he is alert 

and oriented 3 in no apparent distress, there is no fever or chills no headache

or dizziness no chest pain no shortness of breath no cough no nausea or vomiting

no abdominal pain no diarrhea and no urinary symptoms. physical therapy and 

occupational therapy are following.








On 08/26/2022 patient was seen and examined on the medical floor, he is alert 

and oriented, there is no fever or chills no headache or dizziness no chest pain

no shortness of breath no cough no nausea or vomiting no abdominal pain no 

diarrhea and no urinary symptoms, he has good urine output, , physical therapy 

and occupational therapy are following.  We are awaiting nursing home placement








On 08/27/2022 patient was seen and examined on the medical floor he is alert and

oriented in no distress he is still complaining of generalized weakness and gait

disturbance is also complaining of severe pain at the site of his foot ulcer 

otherwise he denies any complaints there is no fever or chills no headache or 

dizziness no chest pain no shortness of breath no cough no nausea or vomiting no

abdominal pain no diarrhea and no urinary symptoms.





On 8/28 2022 patient was seen and examined on the medical floor he is alert and 

oriented 3 in no apparent distress there is no fever or chills no headache or 

dizziness no chest pain no shortness of breath no cough no nausea or vomiting no

abdominal pain no diarrhea and no urinary symptoms.  He is still complaining of 

generalized weakness and inability to walk he is complaining of pain at the site

of his lower extremity ulcer otherwise he denies any complaints additionally 

there is evidence of thrush at this time.





On 08/29/2022 patient was seen and examined on the medical floor he is alert and

oriented 3 in no apparent distress there is no fever or chills no headache or 

dizziness no chest pain soft breath no cough no nausea or vomiting no abdominal 

pain no diarrhea and no urinary symptoms he is still complaining of generalized 

weakness otherwise he denies any complaints.





On 08/30/2022 patient was seen and examined on the medical floor he is alert and

oriented 3 in no apparent distress he is complaining of generalized weakness 

and bilateral knee pain otherwise he denies any complaints there is no fever or 

chills no headache or dizziness no chest pain no shortness of breath no cough no

nausea or vomiting no abdominal pain no diarrhea and no urinary symptoms.  At 

this time we are waiting for placement at the rehab facility





On 08/31/2022 patient was seen and examined on the medical floor he is alert and

oriented 3 in no apparent distress he is still complaining of severe weakness 

and pain in bilateral lower extremities and difficulty standing and walking 

otherwise he denies any complaints there is no fever or chills no headache or 

dizziness no chest pain no shortness of breath no cough no nausea or vomiting no

abdominal pain no diarrhea and no urinary symptoms





Objective





- Vital Signs


Vital signs: 


                                   Vital Signs











Temp  98.4 F   08/31/22 14:00


 


Pulse  66   08/31/22 14:00


 


Resp  18   08/31/22 14:00


 


BP  117/66   08/31/22 14:00


 


Pulse Ox  95   08/31/22 14:00


 


FiO2      








                                 Intake & Output











 08/30/22 08/31/22 08/31/22





 18:59 06:59 18:59


 


Output Total   300


 


Balance   -300


 


Output:   


 


  Urine   300


 


Other:   


 


  Voiding Method  Urinal Urinal


 


  # Voids 1 3 1


 


  # Bowel Movements   1














- Exam








In general patient is alert and oriented x 3 in no distress


HEENT head normocephalic and atraumatic


Neck is supple no JVD no goiter no lymphadenopathy no carotid bruit


Chest examination is clear to auscultation no crackles no wheezing


Cardiac exam reveals regular heart sounds S1 and S2 no gallops no murmurs


Abdomen is soft nontender no organomegaly with normal bowel sounds


Extremity exam reveals no edema no cyanosis or clubbing right foot erythema and 

medial right ankle scabbed ulcer


Neurological examination reveals no gross focal deficits





- Labs


CBC & Chem 7: 


                                 08/29/22 06:06





                                 08/29/22 06:06





Assessment and Plan


Plan: 








Alcohol intoxication





Early alcohol withdrawal will





Right lower extremity cellulitis with 





Mental status changes with delirium





Underlying history of hypertension





Underlying history of paroxysmal atrial fibrillation not on anticoagulation due 

to multiple falls





Underlying history of depression with anxiety





Underlying history of excessive alcohol use patient was admitted recently with 

alcohol intoxication








At this time patient is admitted to medical floor he was started on CIWA 

protocol with IV Ativan


Will start IV cefazolin for right lower extremity cellulitis


Will check right lower extremity venous Doppler


Recheck labs in a.m.


Prognosis is guarded patient is not receptive to any counseling at this time 

regarding alcohol will try in the next few days

## 2022-09-01 VITALS
DIASTOLIC BLOOD PRESSURE: 72 MMHG | TEMPERATURE: 98.3 F | RESPIRATION RATE: 16 BRPM | SYSTOLIC BLOOD PRESSURE: 138 MMHG | HEART RATE: 58 BPM

## 2022-09-01 RX ADMIN — GABAPENTIN SCH MG: 100 CAPSULE ORAL at 10:02

## 2022-09-01 RX ADMIN — HEPARIN SODIUM SCH: 5000 INJECTION INTRAVENOUS; SUBCUTANEOUS at 00:40

## 2022-09-01 RX ADMIN — PANTOPRAZOLE SODIUM SCH MG: 40 TABLET, DELAYED RELEASE ORAL at 10:02

## 2022-09-01 RX ADMIN — SODIUM CHLORIDE SCH MLS/HR: 900 INJECTION, SOLUTION INTRAVENOUS at 10:02

## 2022-09-01 RX ADMIN — THERA TABS SCH EACH: TAB at 10:01

## 2022-09-01 RX ADMIN — CEFEPIME HYDROCHLORIDE SCH MLS/HR: 2 INJECTION, POWDER, FOR SOLUTION INTRAVENOUS at 12:41

## 2022-09-01 RX ADMIN — FAMOTIDINE SCH MG: 20 TABLET, FILM COATED ORAL at 10:02

## 2022-09-01 RX ADMIN — NYSTATIN SCH UNIT: 100000 SUSPENSION ORAL at 10:03

## 2022-09-01 RX ADMIN — CEFEPIME HYDROCHLORIDE SCH MLS/HR: 2 INJECTION, POWDER, FOR SOLUTION INTRAVENOUS at 04:27

## 2022-09-01 RX ADMIN — NYSTATIN SCH UNIT: 100000 SUSPENSION ORAL at 12:41

## 2022-09-01 RX ADMIN — HEPARIN SODIUM SCH: 5000 INJECTION INTRAVENOUS; SUBCUTANEOUS at 10:03

## 2022-09-01 NOTE — P.DS
Providers


Date of admission: 


08/18/22 02:49





Expected date of discharge: 09/01/22


Attending physician: 


Rajinder Foreman





Consults: 





                                        





08/18/22 19:43


Consult Physician Routine 


   Consulting Provider: Haley Baez


   Consult Reason/Comments: Lower extremity cellulitis


   Do you want consulting provider notified?: Yes





08/26/22 13:53


Consult Physician Routine 


   Consulting Provider: Martinez Lawler


   Consult Reason/Comments: gait disturbance


   Do you want consulting provider notified?: Yes











Primary care physician: 


Stated None





Hospital Course: 














Diagnosis on discharge:








Alcohol intoxication





Early alcohol withdrawal will





Right lower extremity cellulitis with open ulcer





oral candidiasis





Mental status changes with delirium





Underlying history of hypertension





Underlying history of paroxysmal atrial fibrillation not on anticoagulation due 

to multiple falls





Underlying history of depression with anxiety





Underlying history of excessive alcohol use patient was admitted recently with 

alcohol intoxication














Hospital course:








Roshan Mir, he is a 71-year-old male who presented to [facility] emergency room with a chief complaint of alcohol intoxication and 

confusion, his living condition were not sanitary, he had a large ulcer on the 

medial aspect of the right ankle.


He was evaluated in the emergency room vital examination on presentation 

revealed a temperature of 98 pulse 84 respiration 20 blood pressure 154/90 and 

pulse ox 96% on room air


Laboratory data reveals a white blood count of 7.5 hemoglobin 16.6 platelet 

count 84 sodium 133 potassium 3.9 chloride 95 CO2 21 BUN 9 creatinine 0.88 

troponin level was 0.0-3


Testing in the emergency room revealed computed tomography scan of the head and 

neck done in the emergency room revealed cerebral atrophy and chronic small 

vessel ischemia no acute intracranial abnormality no change compared to old 

exam, chest x-ray done in the emergency room revealed chronic elevation of the 

right diaphragmatic no change compared to old exam no acute lung disease, EKG 

done in the emergency room revealed supraventricular rhythm with possible 

lateral ischemia


Patient was admitted to medical floor for further evaluation and treatment


On review of systems patient is complaining of right foot and ankle pain 

otherwise he denies any complaints there is no fever or chills no headache or 

dizziness no chest pain no shortness of breath no cough no nausea or vomiting no

abdominal pain no diarrhea and no urinary symptoms.





On 08/19/2022 patient was seen and examined on the medical floor he is alert and

responsive in no apparent distress he is complaining of generalized joint pain 

mostly in bilateral knees and his hands joints, otherwise he denies any 

complaints there is no fever or chills no headache or dizziness no chest pain no

shortness of breath no cough no nausea or vomiting no abdominal pain no diarrhea

and no urinary symptoms.  At this point will check uric acid level will give 1 

dose of IV Toradol and continue to monitor closely.  Continue with IV cefazolin 

for lower extremity cellulitis awaiting input from infectious disease








On 08/20/2022 patient was seen and examined on the medical floor he is alert and

responsive in no apparent distress he is complaining of lower extremity pain, 

otherwise he denies any complaints there is no fever or chills no headache or 

dizziness no chest pain no shortness of breath no cough no nausea or vomiting no

abdominal pain no diarrhea and no urinary symptoms. Patient see n by Dr Baez, 

wound culture taken.  Continue with IV cefazolin for lower extremity cellulitis 

awaiting input from infectious disease.








On 08/21/2022 patient was seen and examined on the medical floor he is alert and

responsive in no apparent distress he was having some episodes of agitation last

night he is still receiving IV Ativan, patient is still receiving IV antibiotic 

for lower extremity cellulitis with ulcer otherwise patient denies any 

complaints there is no fever or chills no headache or dizziness no chest pain no

shortness of breath no cough no nausea or vomiting no abdominal pain no diarrhea

and no urinary symptoms





On 08/22/2022 patient was seen and examined on the medical floor, he is alert 

and oriented 3 in no apparent distress, there is no fever or chills no headache

or dizziness no chest pain no shortness of breath no cough no nausea or vomiting

no abdominal pain no diarrhea and no urinary symptoms, he has good urine output,

nephrology are following and no hemodialysis is recommended at this time, 

physical therapy and occupational therapy are following.





On 08/23/2022 patient was seen and examined on the medical floor he is alert and

oriented 3 in no apparent distress, he is still complaining of lower extremity 

pain and difficulty walking otherwise he denies any complaints there is no fever

or chills no headache or dizziness no chest pain no shortness of breath no cough

no nausea or vomiting no abdominal pain no diarrhea and no urinary symptoms.  

physical therapy were consult patient may need to go to rehab after the acute 

admission








On 08/24/2022 patient was seen and examined on the medical floor he is alert and

oriented 3 in no distress he is complaining of ankle pain and difficulty 

standing and walking otherwise he denies any complaints there is no fever or 

chills no headache or dizziness no chest pain no shortness of breath no cough no

nausea or vomiting no abdominal pain no diarrhea no blood in the stools no 

burning with urination no frequency or urgency no hematuria he is followed by 

physical therapy he would need to be transferred to a rehab unit when stable.





On 08/25/2022 patient was seen and examined on the medical floor, he is alert 

and oriented 3 in no apparent distress, there is no fever or chills no headache

or dizziness no chest pain no shortness of breath no cough no nausea or vomiting

no abdominal pain no diarrhea and no urinary symptoms. physical therapy and occu

pational therapy are following.








On 08/26/2022 patient was seen and examined on the medical floor, he is alert 

and oriented, there is no fever or chills no headache or dizziness no chest pain

no shortness of breath no cough no nausea or vomiting no abdominal pain no 

diarrhea and no urinary symptoms, he has good urine output, , physical therapy 

and occupational therapy are following.  We are awaiting nursing home placement








On 08/27/2022 patient was seen and examined on the medical floor he is alert and

oriented in no distress he is still complaining of generalized weakness and gait

disturbance is also complaining of severe pain at the site of his foot ulcer 

otherwise he denies any complaints there is no fever or chills no headache or 

dizziness no chest pain no shortness of breath no cough no nausea or vomiting no

abdominal pain no diarrhea and no urinary symptoms.





On 8/28 2022 patient was seen and examined on the medical floor he is alert and 

oriented 3 in no apparent distress there is no fever or chills no headache or 

dizziness no chest pain no shortness of breath no cough no nausea or vomiting no

abdominal pain no diarrhea and no urinary symptoms.  He is still complaining of 

generalized weakness and inability to walk he is complaining of pain at the site

of his lower extremity ulcer otherwise he denies any complaints additionally 

there is evidence of thrush at this time








On 08/30/2022 patient was seen and examined on the medical floor he is alert and

oriented 3 in no apparent distress he is complaining of generalized weakness 

and bilateral knee pain otherwise he denies any complaints there is no fever or 

chills no headache or dizziness no chest pain no shortness of breath no cough no

nausea or vomiting no abdominal pain no diarrhea and no urinary symptoms.  At 

this time we are waiting for placement at the rehab facility





On 08/31/2022 patient was seen and examined on the medical floor he is alert and

oriented 3 in no apparent distress he is still complaining of severe weakness 

and pain in bilateral lower extremities and difficulty standing and walking 

otherwise he denies any complaints there is no fever or chills no headache or 

dizziness no chest pain no shortness of breath no cough no nausea or vomiting no

abdominal pain no diarrhea and no urinary symptoms





On 09/01/2022 patient was seen and examined on the medical floor he is alert and

oriented 3 in no apparent distress there is no fever or chills no headache or 

dizziness no chest pain no shortness of breath no cough no nausea or vomiting no

abdominal pain no diarrhea and no urinary symptoms.  Patient is medically stable

he will be discharged to a 24 hour care facility where he can get physical 

therapy and occupational therapy he was counseled again in regard to not 

drinking alcohol anymore


Patient Condition at Discharge: Stable





Plan - Discharge Summary


Discharge Rx Participant: No


New Discharge Prescriptions: 


New


   Cephalexin [Keflex] 500 mg PO Q8HR 10 Days #30 cap


   Multivitamins, Thera [Multivitamin (formulary)] 1 each PO DAILY  tab


   Pantoprazole [Protonix] 40 mg PO AC-BRKFST  tab


   Nystatin 100,000 Unit/ml Susp [Mycostatin Oral Susp] 500,000 unit PO QID  ml


   LORazepam [Ativan] 0.5 mg PO QID PRN  tab


     PRN Reason: Anxiety


   Ciprofloxacin HCl [Cipro] 250 mg PO Q12HR 10 Days #20 tab





Continue


   carvediloL [Coreg*] 12.5 mg PO BID-W/MEALS #60 tab


   amLODIPine [Norvasc] 10 mg PO DAILY #30 tab


   Acetaminophen Tab [Tylenol] 650 mg PO Q6HR PRN  tab


     PRN Reason: Fever And/ Or Pain


   Famotidine [Pepcid] 20 mg PO DAILY #30 tab


   Thiamine [Vitamin B-1] 100 mg PO BID-W/MEALS #60 tab


   Gabapentin [Neurontin] 100 mg PO TID  cap


   allopurinoL [Zyloprim] 300 mg PO DAILY  tab


Discharge Medication List





Acetaminophen Tab [Tylenol] 650 mg PO Q6HR PRN  tab 07/19/22 [Rx]


Famotidine [Pepcid] 20 mg PO DAILY #30 tab 07/19/22 [Rx]


Thiamine [Vitamin B-1] 100 mg PO BID-W/MEALS #60 tab 07/19/22 [Rx]


amLODIPine [Norvasc] 10 mg PO DAILY #30 tab 07/19/22 [Rx]


carvediloL [Coreg*] 12.5 mg PO BID-W/MEALS #60 tab 07/19/22 [Rx]


Gabapentin [Neurontin] 100 mg PO TID  cap 07/26/22 [Rx]


allopurinoL [Zyloprim] 300 mg PO DAILY  tab 07/26/22 [Rx]


Cephalexin [Keflex] 500 mg PO Q8HR 10 Days #30 cap 08/25/22 [Rx]


Ciprofloxacin HCl [Cipro] 250 mg PO Q12HR 10 Days #20 tab 08/25/22 [Rx]


LORazepam [Ativan] 0.5 mg PO QID PRN  tab 08/25/22 [Rx]


Multivitamins, Thera [Multivitamin (formulary)] 1 each PO DAILY  tab 08/25/22 

[Rx]


Pantoprazole [Protonix] 40 mg PO AC-BRKFST  tab 08/25/22 [Rx]


Nystatin 100,000 Unit/ml Susp [Mycostatin Oral Susp] 500,000 unit PO QID  ml 

08/29/22 [Rx]








Follow up Appointment(s)/Referral(s): 


[facility], [NON-STAFF] - 1-2 Days


Rajinder Foreman MD [STAFF PHYSICIAN] - 09/01/22 2:00 pm


Activity/Diet/Wound Care/Special Instructions: 


Wound Care: Right Ankle: cleanse with normal saline q 48 hours, apply aquacel 

silver, cover with gauze. 


Regular Diet


Activity as tolerated.

## 2023-03-05 NOTE — P.PN
Recovery period started- Subjective


Progress Note Date: 10/22/21





Patient denies any new neurological symptoms.  Denies headache.  Patient is 

laying comfortably in the bed.  Patient concerned about discharge, because of 

cellulitis.  He has received IV antibiotics, but will be discharged on Ceftin.





Telemetry monitoring showing sinus rhythm, with sinus tachycardia in 100s, PAC, 

some PVC, quadruplets, trigeminy and BBB.  





Per nursing report, patient is 2 person assist.





Objective





- Vital Signs


Vital signs: 


                                   Vital Signs











Temp  98.5 F   10/22/21 15:48


 


Pulse  66   10/22/21 15:48


 


Resp  16   10/22/21 15:48


 


BP  144/79   10/22/21 15:48


 


Pulse Ox  96   10/22/21 15:48








                                 Intake & Output











 10/22/21 10/23/21 10/23/21





 18:59 06:59 18:59


 


Weight 90 kg  


 


Other:   


 


  Voiding Method Urinal  


 


  # Bowel Movements 1  














- Exam





Patient is alert and awake.  Speech and language functions are normal.  Cranial 

nerves are normal, muscle strength normal.  No ataxia.  Patient not able to get 

up from the bed by 1 nurse with the lap belt.  Tone is mildly increased.  No gina

mors.  No definitive bradykinesia.





- Labs


CBC & Chem 7: 


                                 10/21/21 09:32





                                 10/21/21 09:32





Assessment and Plan


Assessment: 





* Peripheral neuropathy, probably due to alcoholism, also underlying nutritional

  deficiency.


* Vitamin B12 deficiency, folate insufficiency, B6 insufficiency.


* Cellulitis right lateral shin region


* Alcoholism, probably in mild withdrawals, improved.


* History of gout.  Rule out exacerbation of gouty arthritis.


* History of falls with history of subdural hematoma.


* Hypertension, uncontrolled.  Accelerated hypertension.


Plan: 





* Patient's peripheral neuropathy is likely related to alcoholism or nutritional

  deficiency.  


* B12 low 240, folate 8.6, MMA borderline 0.37, B6 borderline 8 (5-50), A1c 5.3,

  TSH 2.85, and RPR pending.  Continue B12 replacement.  Continue folate and 

  vitamin B6 replacement as outpatient.  Patient may need an EMG and nerve 

  conduction of lower extremity as an outpatient if not done in the past.  


* Patient currently on Ceftin for cellulitis.


* Patient is status post alcohol withdrawal.


* We discussed about MRI of the lumbar spine, but patient repeatedly declined.


* Patient also has probable activation of gout.  May need appropriate treatment 

  for exacerbation of gout.


* Treatment of accelerated hypertension as per IM.


* Patient will be started on aspirin 81 mg daily for stroke prevention.  Patient

  has vascular risk factors.


* 2-D echo on 06/03/2021 shows normal left ventricular size.  Severe concentric 

  LVH.  EF is between 55-60%.  Left atrium is mildly dilated.


* Carotid Doppler from 06/02/2021 shows no significant hemodynamic stenosis.  

  Atherosclerotic plaque.


* PT OT evaluate gait.


* Neurologically clear.

## 2023-05-10 ENCOUNTER — HOSPITAL ENCOUNTER (EMERGENCY)
Dept: HOSPITAL 47 - EC | Age: 73
LOS: 1 days | Discharge: HOME | End: 2023-05-11
Payer: MEDICARE

## 2023-05-10 VITALS — TEMPERATURE: 98.1 F

## 2023-05-10 DIAGNOSIS — F10.129: ICD-10-CM

## 2023-05-10 DIAGNOSIS — Z86.59: ICD-10-CM

## 2023-05-10 DIAGNOSIS — Z00.8: Primary | ICD-10-CM
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
## 2023-05-10 DIAGNOSIS — Z90.49: ICD-10-CM

## 2023-05-10 DIAGNOSIS — I10: ICD-10-CM

## 2023-05-10 LAB
ALBUMIN SERPL-MCNC: 4.1 G/DL (ref 3.5–5)
ALP SERPL-CCNC: 85 U/L (ref 38–126)
ALT SERPL-CCNC: 31 U/L (ref 4–49)
ANION GAP SERPL CALC-SCNC: 12 MMOL/L
AST SERPL-CCNC: 57 U/L (ref 17–59)
BASOPHILS # BLD AUTO: 0.1 K/UL (ref 0–0.2)
BASOPHILS NFR BLD AUTO: 1 %
BUN SERPL-SCNC: 15 MG/DL (ref 9–20)
CALCIUM SPEC-MCNC: 8.2 MG/DL (ref 8.4–10.2)
CHLORIDE SERPL-SCNC: 103 MMOL/L (ref 98–107)
CO2 SERPL-SCNC: 24 MMOL/L (ref 22–30)
EOSINOPHIL # BLD AUTO: 0.2 K/UL (ref 0–0.7)
EOSINOPHIL NFR BLD AUTO: 3 %
ERYTHROCYTE [DISTWIDTH] IN BLOOD BY AUTOMATED COUNT: 6.11 M/UL (ref 4.3–5.9)
ERYTHROCYTE [DISTWIDTH] IN BLOOD: 15.7 % (ref 11.5–15.5)
GLUCOSE SERPL-MCNC: 103 MG/DL (ref 74–99)
HCT VFR BLD AUTO: 50.4 % (ref 39–53)
HGB BLD-MCNC: 16 GM/DL (ref 13–17.5)
LYMPHOCYTES # SPEC AUTO: 1.3 K/UL (ref 1–4.8)
LYMPHOCYTES NFR SPEC AUTO: 23 %
MAGNESIUM SPEC-SCNC: 2.2 MG/DL (ref 1.6–2.3)
MCH RBC QN AUTO: 26.3 PG (ref 25–35)
MCHC RBC AUTO-ENTMCNC: 31.8 G/DL (ref 31–37)
MCV RBC AUTO: 82.6 FL (ref 80–100)
MONOCYTES # BLD AUTO: 0.4 K/UL (ref 0–1)
MONOCYTES NFR BLD AUTO: 8 %
NEUTROPHILS # BLD AUTO: 3.5 K/UL (ref 1.3–7.7)
NEUTROPHILS NFR BLD AUTO: 63 %
PLATELET # BLD AUTO: 167 K/UL (ref 150–450)
POTASSIUM SERPL-SCNC: 4.6 MMOL/L (ref 3.5–5.1)
PROT SERPL-MCNC: 6.9 G/DL (ref 6.3–8.2)
SODIUM SERPL-SCNC: 139 MMOL/L (ref 137–145)
WBC # BLD AUTO: 5.6 K/UL (ref 3.8–10.6)

## 2023-05-10 PROCEDURE — 80306 DRUG TEST PRSMV INSTRMNT: CPT

## 2023-05-10 PROCEDURE — 71046 X-RAY EXAM CHEST 2 VIEWS: CPT

## 2023-05-10 PROCEDURE — 99285 EMERGENCY DEPT VISIT HI MDM: CPT

## 2023-05-10 PROCEDURE — 96376 TX/PRO/DX INJ SAME DRUG ADON: CPT

## 2023-05-10 PROCEDURE — 96375 TX/PRO/DX INJ NEW DRUG ADDON: CPT

## 2023-05-10 PROCEDURE — 96361 HYDRATE IV INFUSION ADD-ON: CPT

## 2023-05-10 PROCEDURE — 82075 ASSAY OF BREATH ETHANOL: CPT

## 2023-05-10 PROCEDURE — 83735 ASSAY OF MAGNESIUM: CPT

## 2023-05-10 PROCEDURE — 96374 THER/PROPH/DIAG INJ IV PUSH: CPT

## 2023-05-10 PROCEDURE — 85025 COMPLETE CBC W/AUTO DIFF WBC: CPT

## 2023-05-10 PROCEDURE — 80320 DRUG SCREEN QUANTALCOHOLS: CPT

## 2023-05-10 PROCEDURE — 36415 COLL VENOUS BLD VENIPUNCTURE: CPT

## 2023-05-10 PROCEDURE — 83880 ASSAY OF NATRIURETIC PEPTIDE: CPT

## 2023-05-10 PROCEDURE — 80053 COMPREHEN METABOLIC PANEL: CPT
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
## 2023-05-10 NOTE — XR
EXAMINATION TYPE: XR chest 2V

 

DATE OF EXAM: 5/10/2023 3:01 PM

 

COMPARISON: Chest radiographs from 8/17/2022

 

TECHNIQUE: XR chest 2V Frontal and lateral views of the chest.

 

CLINICAL INDICATION:Male, 72 years old with history of Difficulty breathing ; 

 

FINDINGS: 

Lungs/Pleura: There is no evidence of pleural effusion, focal consolidation, or pneumothorax.  Persis
tent elevation the right hemidiaphragm. Linear scarring within the left midlung.

Pulmonary vascularity: Unremarkable.

Heart/mediastinum: Cardiomediastinal silhouette is unremarkable.

Musculoskeletal: Multiple level degenerative disc disease changes seen throughout the spine. No acute
 osseous abnormality.

 

Other findings: Surgical clips in the upper abdomen.

 

IMPRESSION: 

Changes without evidence for acute process.

## 2023-05-11 VITALS — HEART RATE: 90 BPM | SYSTOLIC BLOOD PRESSURE: 183 MMHG | RESPIRATION RATE: 18 BRPM | DIASTOLIC BLOOD PRESSURE: 87 MMHG

## 2023-05-21 ENCOUNTER — HOSPITAL ENCOUNTER (EMERGENCY)
Dept: HOSPITAL 47 - EC | Age: 73
Discharge: HOME | End: 2023-05-21
Payer: MEDICARE

## 2023-05-21 VITALS
DIASTOLIC BLOOD PRESSURE: 88 MMHG | SYSTOLIC BLOOD PRESSURE: 156 MMHG | RESPIRATION RATE: 16 BRPM | TEMPERATURE: 98.1 F | HEART RATE: 78 BPM

## 2023-05-21 DIAGNOSIS — I10: ICD-10-CM

## 2023-05-21 DIAGNOSIS — F10.129: Primary | ICD-10-CM

## 2023-05-21 DIAGNOSIS — Z86.59: ICD-10-CM

## 2023-05-21 DIAGNOSIS — Z86.73: ICD-10-CM

## 2023-05-21 LAB
ALBUMIN SERPL-MCNC: 3.8 G/DL (ref 3.5–5)
ALP SERPL-CCNC: 103 U/L (ref 38–126)
ALT SERPL-CCNC: 48 U/L (ref 4–49)
ANION GAP SERPL CALC-SCNC: 11 MMOL/L
AST SERPL-CCNC: 133 U/L (ref 17–59)
BASOPHILS # BLD AUTO: 0.1 K/UL (ref 0–0.2)
BASOPHILS NFR BLD AUTO: 1 %
BUN SERPL-SCNC: 25 MG/DL (ref 9–20)
CALCIUM SPEC-MCNC: 7.3 MG/DL (ref 8.4–10.2)
CHLORIDE SERPL-SCNC: 106 MMOL/L (ref 98–107)
CO2 SERPL-SCNC: 19 MMOL/L (ref 22–30)
EOSINOPHIL # BLD AUTO: 0.1 K/UL (ref 0–0.7)
EOSINOPHIL NFR BLD AUTO: 1 %
ERYTHROCYTE [DISTWIDTH] IN BLOOD BY AUTOMATED COUNT: 6.14 M/UL (ref 4.3–5.9)
ERYTHROCYTE [DISTWIDTH] IN BLOOD: 16.2 % (ref 11.5–15.5)
GLUCOSE SERPL-MCNC: 105 MG/DL (ref 74–99)
HCT VFR BLD AUTO: 51 % (ref 39–53)
HGB BLD-MCNC: 16.8 GM/DL (ref 13–17.5)
LIPASE SERPL-CCNC: 186 U/L (ref 23–300)
LYMPHOCYTES # SPEC AUTO: 1.5 K/UL (ref 1–4.8)
LYMPHOCYTES NFR SPEC AUTO: 21 %
MAGNESIUM SPEC-SCNC: 2.4 MG/DL (ref 1.6–2.3)
MCH RBC QN AUTO: 27.3 PG (ref 25–35)
MCHC RBC AUTO-ENTMCNC: 32.9 G/DL (ref 31–37)
MCV RBC AUTO: 82.9 FL (ref 80–100)
MONOCYTES # BLD AUTO: 0.5 K/UL (ref 0–1)
MONOCYTES NFR BLD AUTO: 7 %
NEUTROPHILS # BLD AUTO: 4.7 K/UL (ref 1.3–7.7)
NEUTROPHILS NFR BLD AUTO: 68 %
PLATELET # BLD AUTO: 139 K/UL (ref 150–450)
POTASSIUM SERPL-SCNC: 4.2 MMOL/L (ref 3.5–5.1)
PROT SERPL-MCNC: 6.6 G/DL (ref 6.3–8.2)
SODIUM SERPL-SCNC: 136 MMOL/L (ref 137–145)
WBC # BLD AUTO: 6.9 K/UL (ref 3.8–10.6)

## 2023-05-21 PROCEDURE — 99284 EMERGENCY DEPT VISIT MOD MDM: CPT

## 2023-05-21 PROCEDURE — 83735 ASSAY OF MAGNESIUM: CPT

## 2023-05-21 PROCEDURE — 80053 COMPREHEN METABOLIC PANEL: CPT

## 2023-05-21 PROCEDURE — 83690 ASSAY OF LIPASE: CPT

## 2023-05-21 PROCEDURE — 80320 DRUG SCREEN QUANTALCOHOLS: CPT

## 2023-05-21 PROCEDURE — 85025 COMPLETE CBC W/AUTO DIFF WBC: CPT

## 2023-05-21 PROCEDURE — 36415 COLL VENOUS BLD VENIPUNCTURE: CPT
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
